# Patient Record
Sex: FEMALE | Race: BLACK OR AFRICAN AMERICAN | NOT HISPANIC OR LATINO
[De-identification: names, ages, dates, MRNs, and addresses within clinical notes are randomized per-mention and may not be internally consistent; named-entity substitution may affect disease eponyms.]

---

## 2017-09-20 ENCOUNTER — APPOINTMENT (OUTPATIENT)
Dept: ORTHOPEDIC SURGERY | Facility: CLINIC | Age: 67
End: 2017-09-20
Payer: MEDICARE

## 2017-09-20 PROCEDURE — 73562 X-RAY EXAM OF KNEE 3: CPT | Mod: 50

## 2017-09-20 PROCEDURE — 20610 DRAIN/INJ JOINT/BURSA W/O US: CPT | Mod: LT

## 2017-09-20 PROCEDURE — 99213 OFFICE O/P EST LOW 20 MIN: CPT | Mod: 25

## 2017-09-20 RX ORDER — OXYCODONE AND ACETAMINOPHEN 5; 325 MG/1; MG/1
5-325 TABLET ORAL
Qty: 120 | Refills: 0 | Status: COMPLETED | COMMUNITY
Start: 2017-06-13

## 2017-12-01 ENCOUNTER — EMERGENCY (EMERGENCY)
Facility: HOSPITAL | Age: 67
LOS: 0 days | Discharge: HOME | End: 2017-12-01
Attending: EMERGENCY MEDICINE
Payer: MEDICARE

## 2017-12-01 VITALS
SYSTOLIC BLOOD PRESSURE: 152 MMHG | HEART RATE: 104 BPM | RESPIRATION RATE: 18 BRPM | HEIGHT: 64 IN | TEMPERATURE: 98 F | OXYGEN SATURATION: 99 % | DIASTOLIC BLOOD PRESSURE: 88 MMHG | WEIGHT: 270.07 LBS
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
## 2017-12-01 DIAGNOSIS — M10.9 GOUT, UNSPECIFIED: ICD-10-CM

## 2017-12-01 DIAGNOSIS — T78.40XA ALLERGY, UNSPECIFIED, INITIAL ENCOUNTER: ICD-10-CM

## 2017-12-01 DIAGNOSIS — E66.01 MORBID (SEVERE) OBESITY DUE TO EXCESS CALORIES: ICD-10-CM

## 2017-12-01 DIAGNOSIS — G47.33 OBSTRUCTIVE SLEEP APNEA (ADULT) (PEDIATRIC): ICD-10-CM

## 2017-12-01 DIAGNOSIS — R21 RASH AND OTHER NONSPECIFIC SKIN ERUPTION: ICD-10-CM

## 2017-12-01 DIAGNOSIS — I10 ESSENTIAL (PRIMARY) HYPERTENSION: ICD-10-CM
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
## 2017-12-01 DIAGNOSIS — H40.9 UNSPECIFIED GLAUCOMA: ICD-10-CM

## 2017-12-01 DIAGNOSIS — M19.90 UNSPECIFIED OSTEOARTHRITIS, UNSPECIFIED SITE: ICD-10-CM

## 2017-12-01 LAB
ALBUMIN SERPL ELPH-MCNC: 3.6 G/DL — SIGNIFICANT CHANGE UP (ref 3.3–5)
ALP SERPL-CCNC: 61 U/L — SIGNIFICANT CHANGE UP (ref 40–120)
ALT FLD-CCNC: 24 U/L — SIGNIFICANT CHANGE UP (ref 12–78)
ANION GAP SERPL CALC-SCNC: 9 MMOL/L — SIGNIFICANT CHANGE UP (ref 5–17)
AST SERPL-CCNC: 15 U/L — SIGNIFICANT CHANGE UP (ref 15–37)
BASOPHILS # BLD AUTO: 0.1 K/UL — SIGNIFICANT CHANGE UP (ref 0–0.2)
BASOPHILS NFR BLD AUTO: 0.9 % — SIGNIFICANT CHANGE UP (ref 0–2)
BILIRUB SERPL-MCNC: 0.4 MG/DL — SIGNIFICANT CHANGE UP (ref 0.2–1.2)
BUN SERPL-MCNC: 19 MG/DL — SIGNIFICANT CHANGE UP (ref 7–23)
CALCIUM SERPL-MCNC: 9.3 MG/DL — SIGNIFICANT CHANGE UP (ref 8.5–10.1)
CHLORIDE SERPL-SCNC: 102 MMOL/L — SIGNIFICANT CHANGE UP (ref 96–108)
CO2 SERPL-SCNC: 29 MMOL/L — SIGNIFICANT CHANGE UP (ref 22–31)
CREAT SERPL-MCNC: 1.36 MG/DL — HIGH (ref 0.5–1.3)
EOSINOPHIL # BLD AUTO: 0 K/UL — SIGNIFICANT CHANGE UP (ref 0–0.5)
EOSINOPHIL NFR BLD AUTO: 0.5 % — SIGNIFICANT CHANGE UP (ref 0–6)
GLUCOSE SERPL-MCNC: 110 MG/DL — HIGH (ref 70–99)
HCT VFR BLD CALC: 42 % — SIGNIFICANT CHANGE UP (ref 34.5–45)
HGB BLD-MCNC: 13.2 G/DL — SIGNIFICANT CHANGE UP (ref 11.5–15.5)
LYMPHOCYTES # BLD AUTO: 3.1 K/UL — SIGNIFICANT CHANGE UP (ref 1–3.3)
LYMPHOCYTES # BLD AUTO: 41.2 % — SIGNIFICANT CHANGE UP (ref 13–44)
MCHC RBC-ENTMCNC: 29.1 PG — SIGNIFICANT CHANGE UP (ref 27–34)
MCHC RBC-ENTMCNC: 31.4 GM/DL — LOW (ref 32–36)
MCV RBC AUTO: 92.6 FL — SIGNIFICANT CHANGE UP (ref 80–100)
MONOCYTES # BLD AUTO: 0.4 K/UL — SIGNIFICANT CHANGE UP (ref 0–0.9)
MONOCYTES NFR BLD AUTO: 4.9 % — SIGNIFICANT CHANGE UP (ref 2–14)
NEUTROPHILS # BLD AUTO: 4 K/UL — SIGNIFICANT CHANGE UP (ref 1.8–7.4)
NEUTROPHILS NFR BLD AUTO: 52.5 % — SIGNIFICANT CHANGE UP (ref 43–77)
PLATELET # BLD AUTO: 282 K/UL — SIGNIFICANT CHANGE UP (ref 150–400)
POTASSIUM SERPL-MCNC: 4.1 MMOL/L — SIGNIFICANT CHANGE UP (ref 3.5–5.3)
POTASSIUM SERPL-SCNC: 4.1 MMOL/L — SIGNIFICANT CHANGE UP (ref 3.5–5.3)
PROT SERPL-MCNC: 8.9 GM/DL — HIGH (ref 6–8.3)
RBC # BLD: 4.54 M/UL — SIGNIFICANT CHANGE UP (ref 3.8–5.2)
RBC # FLD: 12.8 % — SIGNIFICANT CHANGE UP (ref 11–15)
SODIUM SERPL-SCNC: 140 MMOL/L — SIGNIFICANT CHANGE UP (ref 135–145)
WBC # BLD: 7.6 K/UL — SIGNIFICANT CHANGE UP (ref 3.8–10.5)
WBC # FLD AUTO: 7.6 K/UL — SIGNIFICANT CHANGE UP (ref 3.8–10.5)

## 2017-12-01 PROCEDURE — 99284 EMERGENCY DEPT VISIT MOD MDM: CPT

## 2017-12-01 RX ORDER — DIPHENHYDRAMINE HCL 50 MG
1 CAPSULE ORAL
Qty: 15 | Refills: 0
Start: 2017-12-01 | End: 2017-12-04

## 2017-12-01 RX ORDER — FAMOTIDINE 10 MG/ML
20 INJECTION INTRAVENOUS ONCE
Qty: 0 | Refills: 0 | Status: COMPLETED | OUTPATIENT
Start: 2017-12-01 | End: 2017-12-01

## 2017-12-01 RX ORDER — FAMOTIDINE 10 MG/ML
1 INJECTION INTRAVENOUS
Qty: 7 | Refills: 0
Start: 2017-12-01 | End: 2017-12-08

## 2017-12-01 RX ORDER — DIPHENHYDRAMINE HCL 50 MG
50 CAPSULE ORAL ONCE
Qty: 0 | Refills: 0 | Status: COMPLETED | OUTPATIENT
Start: 2017-12-01 | End: 2017-12-01

## 2017-12-01 RX ADMIN — Medication 50 MILLIGRAM(S): at 17:38

## 2017-12-01 RX ADMIN — Medication 125 MILLIGRAM(S): at 17:38

## 2017-12-01 RX ADMIN — FAMOTIDINE 20 MILLIGRAM(S): 10 INJECTION INTRAVENOUS at 17:38

## 2017-12-01 NOTE — ED ADULT TRIAGE NOTE - CHIEF COMPLAINT QUOTE
c/o allergic reaction with periorbital swelling, pruritic hives and throat swelling onset at 1545,15 mins after receiving dye at ent test denies difficulty breathing at present

## 2017-12-01 NOTE — ED ADULT NURSE NOTE - OBJECTIVE STATEMENT
Patient alert stating she had a FEEST test done and thinks she is having an allergic reaction to the dye they used. Observable swelling to right side of face in the orbital region and lip. Denies difficulty breathing.;

## 2017-12-01 NOTE — ED PROVIDER NOTE - CONSTITUTIONAL, MLM
normal... Well appearing, well nourished, awake, alert, oriented to person, place, time/situation and in no apparent distress. Speaking in clear full sentences no drooling no shoulders retractions smiling appears comfortable

## 2017-12-01 NOTE — ED PROVIDER NOTE - OBJECTIVE STATEMENT
67 years old female here c/o progressive itching hives to the face and upper back and chest after she went to Dr. Estrella MUKHERJEE with oral contrast at 3:00 pm today. Pt denies headache, difficulty of swallowing, dizziness, sob, chest pain, nausea, vomiting, fever, chills, abd pain, dysuria or irregular bowel movements. Pt sts she took benadryl 25 mg orally one hour ago.

## 2017-12-01 NOTE — ED PROVIDER NOTE - PROGRESS NOTE DETAILS
Pt is alert and oriented x 3 smiling tolerate oral water at bed side sts she is feeling much better rash is resolved. Pt also denies difficulty of swallowing, headache, dizziness, cough, chest pain, nausea, vomiting, fever, chills, abd pain. Pt is advised to follow up with her doctor as soon as possible.

## 2017-12-01 NOTE — ED PROVIDER NOTE - PMH
GCA (giant cell arteritis)    GERD (gastroesophageal reflux disease)    Glaucoma    Gout    H/O seasonal allergies    HTN (hypertension)    Morbid obesity    OA (osteoarthritis)    BERKLEY (obstructive sleep apnea)  possible  Shoulder pain  radaiting to neck

## 2017-12-01 NOTE — ED PROVIDER NOTE - THROAT FINDINGS
no exudate/no redness/uvula midline/no vesicles/NO VESICLES/ULCERS/NO DROOLING/NO TONGUE ELEVATION/NO STRIDOR

## 2017-12-05 ENCOUNTER — RESULT REVIEW (OUTPATIENT)
Age: 67
End: 2017-12-05

## 2018-05-23 ENCOUNTER — RESULT REVIEW (OUTPATIENT)
Age: 68
End: 2018-05-23

## 2018-11-02 ENCOUNTER — APPOINTMENT (OUTPATIENT)
Dept: ORTHOPEDIC SURGERY | Facility: CLINIC | Age: 68
End: 2018-11-02
Payer: MEDICARE

## 2018-11-02 VITALS — HEIGHT: 64 IN | BODY MASS INDEX: 48.65 KG/M2 | WEIGHT: 285 LBS

## 2018-11-02 PROCEDURE — 99213 OFFICE O/P EST LOW 20 MIN: CPT

## 2018-11-14 ENCOUNTER — APPOINTMENT (OUTPATIENT)
Dept: ORTHOPEDIC SURGERY | Facility: CLINIC | Age: 68
End: 2018-11-14
Payer: MEDICARE

## 2018-11-14 VITALS — WEIGHT: 285 LBS | BODY MASS INDEX: 48.65 KG/M2 | HEIGHT: 64 IN

## 2018-11-14 DIAGNOSIS — M17.0 BILATERAL PRIMARY OSTEOARTHRITIS OF KNEE: ICD-10-CM

## 2018-11-14 DIAGNOSIS — M21.161 VARUS DEFORMITY, NOT ELSEWHERE CLASSIFIED, RIGHT KNEE: ICD-10-CM

## 2018-11-14 DIAGNOSIS — M21.162 VARUS DEFORMITY, NOT ELSEWHERE CLASSIFIED, LEFT KNEE: ICD-10-CM

## 2018-11-14 DIAGNOSIS — M25.561 PAIN IN RIGHT KNEE: ICD-10-CM

## 2018-11-14 DIAGNOSIS — M25.562 PAIN IN RIGHT KNEE: ICD-10-CM

## 2018-11-14 PROCEDURE — 99215 OFFICE O/P EST HI 40 MIN: CPT | Mod: 25

## 2018-11-14 PROCEDURE — 20610 DRAIN/INJ JOINT/BURSA W/O US: CPT | Mod: 50

## 2018-11-14 PROCEDURE — 73564 X-RAY EXAM KNEE 4 OR MORE: CPT | Mod: 50

## 2019-11-27 ENCOUNTER — APPOINTMENT (OUTPATIENT)
Dept: ORTHOPEDIC SURGERY | Facility: CLINIC | Age: 69
End: 2019-11-27

## 2020-01-27 ENCOUNTER — TRANSCRIPTION ENCOUNTER (OUTPATIENT)
Age: 70
End: 2020-01-27

## 2020-08-25 ENCOUNTER — INPATIENT (INPATIENT)
Facility: HOSPITAL | Age: 70
LOS: 7 days | Discharge: HOME | DRG: 637 | End: 2020-09-02
Attending: SPECIALIST | Admitting: SPECIALIST
Payer: MEDICARE

## 2020-08-25 VITALS
WEIGHT: 279.99 LBS | HEART RATE: 102 BPM | SYSTOLIC BLOOD PRESSURE: 206 MMHG | HEIGHT: 64 IN | RESPIRATION RATE: 18 BRPM | TEMPERATURE: 98 F | DIASTOLIC BLOOD PRESSURE: 102 MMHG | OXYGEN SATURATION: 96 %

## 2020-08-25 LAB
BASOPHILS # BLD AUTO: 0.01 K/UL — SIGNIFICANT CHANGE UP (ref 0–0.2)
BASOPHILS NFR BLD AUTO: 0.1 % — SIGNIFICANT CHANGE UP (ref 0–2)
BILIRUB SERPL-MCNC: 0.5 MG/DL — SIGNIFICANT CHANGE UP (ref 0.2–1.2)
CHLORIDE SERPL-SCNC: 91 MMOL/L — LOW (ref 96–108)
CO2 SERPL-SCNC: 21 MMOL/L — LOW (ref 22–31)
CREAT SERPL-MCNC: 1.24 MG/DL — SIGNIFICANT CHANGE UP (ref 0.5–1.3)
EOSINOPHIL # BLD AUTO: 0 K/UL — SIGNIFICANT CHANGE UP (ref 0–0.5)
EOSINOPHIL NFR BLD AUTO: 0 % — SIGNIFICANT CHANGE UP (ref 0–6)
HCT VFR BLD CALC: 44.5 % — SIGNIFICANT CHANGE UP (ref 34.5–45)
HGB BLD-MCNC: 14.4 G/DL — SIGNIFICANT CHANGE UP (ref 11.5–15.5)
IMM GRANULOCYTES NFR BLD AUTO: 0.3 % — SIGNIFICANT CHANGE UP (ref 0–1.5)
LYMPHOCYTES # BLD AUTO: 0.81 K/UL — LOW (ref 1–3.3)
LYMPHOCYTES # BLD AUTO: 7.4 % — LOW (ref 13–44)
MAGNESIUM SERPL-MCNC: 2.4 MG/DL — SIGNIFICANT CHANGE UP (ref 1.6–2.6)
MCHC RBC-ENTMCNC: 29.6 PG — SIGNIFICANT CHANGE UP (ref 27–34)
MCHC RBC-ENTMCNC: 32.4 GM/DL — SIGNIFICANT CHANGE UP (ref 32–36)
MCV RBC AUTO: 91.4 FL — SIGNIFICANT CHANGE UP (ref 80–100)
MONOCYTES # BLD AUTO: 0.5 K/UL — SIGNIFICANT CHANGE UP (ref 0–0.9)
MONOCYTES NFR BLD AUTO: 4.6 % — SIGNIFICANT CHANGE UP (ref 2–14)
NEUTROPHILS # BLD AUTO: 9.53 K/UL — HIGH (ref 1.8–7.4)
NEUTROPHILS NFR BLD AUTO: 87.6 % — HIGH (ref 43–77)
NRBC # BLD: 0 /100 WBCS — SIGNIFICANT CHANGE UP (ref 0–0)
PHOSPHATE SERPL-MCNC: 3.3 MG/DL — SIGNIFICANT CHANGE UP (ref 2.5–4.5)
PLATELET # BLD AUTO: 177 K/UL — SIGNIFICANT CHANGE UP (ref 150–400)
POTASSIUM SERPL-MCNC: 5.2 MMOL/L — SIGNIFICANT CHANGE UP (ref 3.5–5.3)
POTASSIUM SERPL-SCNC: 5.2 MMOL/L — SIGNIFICANT CHANGE UP (ref 3.5–5.3)
RBC # BLD: 4.87 M/UL — SIGNIFICANT CHANGE UP (ref 3.8–5.2)
RBC # FLD: 13.9 % — SIGNIFICANT CHANGE UP (ref 10.3–14.5)
SODIUM SERPL-SCNC: 134 MMOL/L — LOW (ref 135–145)
WBC # BLD: 10.88 K/UL — HIGH (ref 3.8–10.5)
WBC # FLD AUTO: 10.88 K/UL — HIGH (ref 3.8–10.5)

## 2020-08-25 PROCEDURE — 93010 ELECTROCARDIOGRAM REPORT: CPT

## 2020-08-25 PROCEDURE — 99291 CRITICAL CARE FIRST HOUR: CPT

## 2020-08-25 RX ORDER — LABETALOL HCL 100 MG
10 TABLET ORAL ONCE
Refills: 0 | Status: COMPLETED | OUTPATIENT
Start: 2020-08-25 | End: 2020-08-25

## 2020-08-25 RX ADMIN — Medication 10 MILLIGRAM(S): at 23:59

## 2020-08-25 NOTE — ED PROVIDER NOTE - PROGRESS NOTE DETAILS
CMP sig for elevated glucose to 796 w/ anion gap of 22. Insulin gtt started 2/2 C/f DKA. 1L LR bolus. MICU consulted. Trop elevated to 74, will trend. Pending CT head, CTA head and neck for dystonia. Neuro consulted pending recs Evaluated pt prior to MICU transfer. Pt w/ similar presenting tremors but responding immediately when spoken to. Remained on the monitor during transfer. During transfer pt was also noted to have  similar tremors but  not following commands for the first time. Decision was made to continue MICU transfer. Upon arrival to the MICU, it was noted that patient was not on the monitor anymore.

## 2020-08-25 NOTE — ED PROVIDER NOTE - OBJECTIVE STATEMENT
71 y/o F hx of HTN, gout presenting w/ generalized weakness and gout. Pt states weakness began 2 weeks ago. Associated with fatigue. Patient also endorsing dysuria x 1 week w/ associated increase in urinary frequency. States 2 days ago she started to experience right sided hand tremors. No known aggravating or alleviating factors; present at rest and with movement. ROS otherwise positive for right sided chest pain x 2 days, described as a 3/10 in severity without radiation. Denies SOB, dizziness/light headedness, fevers, chills, nausea, vomiting, abdominal pain, constipation, diarrhea, recent travel or sick contacts.

## 2020-08-25 NOTE — ED PROVIDER NOTE - CLINICAL SUMMARY MEDICAL DECISION MAKING FREE TEXT BOX
69 y/o F hx of HTN, gout presenting w/ generalized weakness and gout presenting w/ generalized weakness w/ associated increased urinary frequency/dysuria, also w/ new onset focal upper extremity tremors. Concerning for UTI. R/o CVA in setting of FND. Will obtain labwork, CT head, CTA head and neck and consult neurology. Will reassess.

## 2020-08-25 NOTE — ED PROVIDER NOTE - PHYSICAL EXAMINATION
GENERAL APPEARANCE: Well developed, NAD  HEENT:  PERRL, EOMI. hearing grossly intact.  NECK: Neck supple, non-tender no lymphadenopathy  CARDIAC: Normal S1 and S2. no mrg. RRR  LUNGS: Clear to auscultation B/L, no rales, rhonchi, or wheezing  ABDOMEN: Soft , NTND, bowel sounds normal. No guarding or rebound.   EXTREMITIES: No edema. Peripheral pulses intact.   NEUROLOGICAL: + tremor right upper ext. Lower ext weakness B/L. Upper extremity 5/5 strength B/L. Sensation symmetric and intact throughout.   SKIN: Warm and dry GENERAL APPEARANCE: Well developed, NAD  HEENT:  PERRL, EOMI. hearing grossly intact.  NECK: Neck supple, non-tender no lymphadenopathy  CARDIAC: Normal S1 and S2. no mrg. RRR  LUNGS: Clear to auscultation B/L, no rales, rhonchi, or wheezing  ABDOMEN: Soft , NTND, bowel sounds normal. No guarding or rebound.   EXTREMITIES: + pitting edema. Peripheral pulses intact.   NEUROLOGICAL: + tremor right upper ext. Lower ext weakness B/L. Upper extremity 5/5 strength B/L. Sensation symmetric and intact throughout.   SKIN: Warm and dry

## 2020-08-25 NOTE — ED PROVIDER NOTE - ATTENDING CONTRIBUTION TO CARE
ED attending Xochitl HERZOG performed a history and physical exam of the patient and discussed their management with the resident/ACP. I reviewed the resident/ACP's note and agree with the documented findings and plan of care except for the following.       70 year old female with history of HTN, gout presented to ED with 2 weeks of right arm tremor as well as generalized weakness associated with increased urinary frequency. No headache, fever, chills. No similar prior episodes. No URI symptoms or sick contacts. No fall or traumas. On exam, pt noted to have right arm intentional tremor. Otherwise sensation intact. 5/5 strength in all 4 extremities. New intentional right upper extremity tremor likely secondary to structural CNS lesion vs CVA. Pt also reported atypical right sided chest pain. Will rule out ACS although low suspicion.  Plan: CT brain, labs, neurology consult. Reassess.

## 2020-08-25 NOTE — ED ADULT TRIAGE NOTE - CHIEF COMPLAINT QUOTE
Weakness and tremors in right arm x1 week. Daughter reports fevers and chills at home. Weakness, tremors and "stiffness" in right arm x1 week. Daughter reports fevers and chills at home.

## 2020-08-26 DIAGNOSIS — R73.9 HYPERGLYCEMIA, UNSPECIFIED: ICD-10-CM

## 2020-08-26 LAB
A1C WITH ESTIMATED AVERAGE GLUCOSE RESULT: 9 % — HIGH (ref 4–5.6)
ALBUMIN SERPL ELPH-MCNC: 3.7 G/DL — SIGNIFICANT CHANGE UP (ref 3.3–5)
ALBUMIN SERPL ELPH-MCNC: 3.7 G/DL — SIGNIFICANT CHANGE UP (ref 3.3–5)
ALP SERPL-CCNC: 118 U/L — SIGNIFICANT CHANGE UP (ref 40–120)
ALP SERPL-CCNC: 93 U/L — SIGNIFICANT CHANGE UP (ref 40–120)
ALT FLD-CCNC: 48 U/L — HIGH (ref 10–45)
ALT FLD-CCNC: 56 U/L — HIGH (ref 10–45)
ANION GAP SERPL CALC-SCNC: 12 MMOL/L — SIGNIFICANT CHANGE UP (ref 5–17)
ANION GAP SERPL CALC-SCNC: 13 MMOL/L — SIGNIFICANT CHANGE UP (ref 5–17)
ANION GAP SERPL CALC-SCNC: 15 MMOL/L — SIGNIFICANT CHANGE UP (ref 5–17)
ANION GAP SERPL CALC-SCNC: 20 MMOL/L — HIGH (ref 5–17)
ANION GAP SERPL CALC-SCNC: 22 MMOL/L — HIGH (ref 5–17)
ANION GAP SERPL CALC-SCNC: 25 MMOL/L — HIGH (ref 5–17)
ANION GAP SERPL CALC-SCNC: 25 MMOL/L — HIGH (ref 5–17)
APPEARANCE UR: CLEAR — SIGNIFICANT CHANGE UP
APTT BLD: 23.7 SEC — LOW (ref 27.5–35.5)
AST SERPL-CCNC: 28 U/L — SIGNIFICANT CHANGE UP (ref 10–40)
AST SERPL-CCNC: 60 U/L — HIGH (ref 10–40)
B-OH-BUTYR SERPL-SCNC: 2.3 MMOL/L — HIGH
BACTERIA # UR AUTO: ABNORMAL
BASE EXCESS BLDV CALC-SCNC: 5 MMOL/L — HIGH (ref -2–2)
BILIRUB SERPL-MCNC: 0.4 MG/DL — SIGNIFICANT CHANGE UP (ref 0.2–1.2)
BILIRUB UR-MCNC: NEGATIVE — SIGNIFICANT CHANGE UP
BUN SERPL-MCNC: 14 MG/DL — SIGNIFICANT CHANGE UP (ref 7–23)
BUN SERPL-MCNC: 18 MG/DL — SIGNIFICANT CHANGE UP (ref 7–23)
BUN SERPL-MCNC: 20 MG/DL — SIGNIFICANT CHANGE UP (ref 7–23)
BUN SERPL-MCNC: 24 MG/DL — HIGH (ref 7–23)
BUN SERPL-MCNC: 26 MG/DL — HIGH (ref 7–23)
BUN SERPL-MCNC: 26 MG/DL — HIGH (ref 7–23)
BUN SERPL-MCNC: 29 MG/DL — HIGH (ref 7–23)
CA-I SERPL-SCNC: 1.03 MMOL/L — LOW (ref 1.12–1.3)
CALCIUM SERPL-MCNC: 8.2 MG/DL — LOW (ref 8.4–10.5)
CALCIUM SERPL-MCNC: 8.4 MG/DL — SIGNIFICANT CHANGE UP (ref 8.4–10.5)
CALCIUM SERPL-MCNC: 8.7 MG/DL — SIGNIFICANT CHANGE UP (ref 8.4–10.5)
CALCIUM SERPL-MCNC: 8.8 MG/DL — SIGNIFICANT CHANGE UP (ref 8.4–10.5)
CALCIUM SERPL-MCNC: 9.1 MG/DL — SIGNIFICANT CHANGE UP (ref 8.4–10.5)
CALCIUM SERPL-MCNC: 9.6 MG/DL — SIGNIFICANT CHANGE UP (ref 8.4–10.5)
CALCIUM SERPL-MCNC: 9.7 MG/DL — SIGNIFICANT CHANGE UP (ref 8.4–10.5)
CHLORIDE BLDV-SCNC: 97 MMOL/L — SIGNIFICANT CHANGE UP (ref 96–108)
CHLORIDE SERPL-SCNC: 102 MMOL/L — SIGNIFICANT CHANGE UP (ref 96–108)
CHLORIDE SERPL-SCNC: 104 MMOL/L — SIGNIFICANT CHANGE UP (ref 96–108)
CHLORIDE SERPL-SCNC: 94 MMOL/L — LOW (ref 96–108)
CHLORIDE SERPL-SCNC: 98 MMOL/L — SIGNIFICANT CHANGE UP (ref 96–108)
CO2 BLDV-SCNC: 31 MMOL/L — HIGH (ref 22–30)
CO2 SERPL-SCNC: 20 MMOL/L — LOW (ref 22–31)
CO2 SERPL-SCNC: 22 MMOL/L — SIGNIFICANT CHANGE UP (ref 22–31)
CO2 SERPL-SCNC: 24 MMOL/L — SIGNIFICANT CHANGE UP (ref 22–31)
CO2 SERPL-SCNC: 25 MMOL/L — SIGNIFICANT CHANGE UP (ref 22–31)
CO2 SERPL-SCNC: 25 MMOL/L — SIGNIFICANT CHANGE UP (ref 22–31)
CO2 SERPL-SCNC: 27 MMOL/L — SIGNIFICANT CHANGE UP (ref 22–31)
COLOR SPEC: COLORLESS — SIGNIFICANT CHANGE UP
CREAT SERPL-MCNC: 0.72 MG/DL — SIGNIFICANT CHANGE UP (ref 0.5–1.3)
CREAT SERPL-MCNC: 0.92 MG/DL — SIGNIFICANT CHANGE UP (ref 0.5–1.3)
CREAT SERPL-MCNC: 0.95 MG/DL — SIGNIFICANT CHANGE UP (ref 0.5–1.3)
CREAT SERPL-MCNC: 1.07 MG/DL — SIGNIFICANT CHANGE UP (ref 0.5–1.3)
CREAT SERPL-MCNC: 1.12 MG/DL — SIGNIFICANT CHANGE UP (ref 0.5–1.3)
CREAT SERPL-MCNC: 1.27 MG/DL — SIGNIFICANT CHANGE UP (ref 0.5–1.3)
DIFF PNL FLD: ABNORMAL
EPI CELLS # UR: 1 /HPF — SIGNIFICANT CHANGE UP
ESTIMATED AVERAGE GLUCOSE: 212 MG/DL — HIGH (ref 68–114)
GAS PNL BLDA: SIGNIFICANT CHANGE UP
GAS PNL BLDA: SIGNIFICANT CHANGE UP
GAS PNL BLDV: 131 MMOL/L — LOW (ref 135–145)
GAS PNL BLDV: SIGNIFICANT CHANGE UP
GLUCOSE BLDC GLUCOMTR-MCNC: 123 MG/DL — HIGH (ref 70–99)
GLUCOSE BLDC GLUCOMTR-MCNC: 296 MG/DL — HIGH (ref 70–99)
GLUCOSE BLDC GLUCOMTR-MCNC: 303 MG/DL — HIGH (ref 70–99)
GLUCOSE BLDC GLUCOMTR-MCNC: 311 MG/DL — HIGH (ref 70–99)
GLUCOSE BLDC GLUCOMTR-MCNC: 317 MG/DL — HIGH (ref 70–99)
GLUCOSE BLDC GLUCOMTR-MCNC: 324 MG/DL — HIGH (ref 70–99)
GLUCOSE BLDC GLUCOMTR-MCNC: 330 MG/DL — HIGH (ref 70–99)
GLUCOSE BLDC GLUCOMTR-MCNC: 331 MG/DL — HIGH (ref 70–99)
GLUCOSE BLDC GLUCOMTR-MCNC: 336 MG/DL — HIGH (ref 70–99)
GLUCOSE BLDC GLUCOMTR-MCNC: 339 MG/DL — HIGH (ref 70–99)
GLUCOSE BLDC GLUCOMTR-MCNC: 343 MG/DL — HIGH (ref 70–99)
GLUCOSE BLDC GLUCOMTR-MCNC: 348 MG/DL — HIGH (ref 70–99)
GLUCOSE BLDC GLUCOMTR-MCNC: 354 MG/DL — HIGH (ref 70–99)
GLUCOSE BLDC GLUCOMTR-MCNC: 420 MG/DL — HIGH (ref 70–99)
GLUCOSE BLDC GLUCOMTR-MCNC: 473 MG/DL — CRITICAL HIGH (ref 70–99)
GLUCOSE BLDC GLUCOMTR-MCNC: 481 MG/DL — CRITICAL HIGH (ref 70–99)
GLUCOSE BLDC GLUCOMTR-MCNC: 515 MG/DL — CRITICAL HIGH (ref 70–99)
GLUCOSE BLDC GLUCOMTR-MCNC: 517 MG/DL — CRITICAL HIGH (ref 70–99)
GLUCOSE BLDC GLUCOMTR-MCNC: 550 MG/DL — CRITICAL HIGH (ref 70–99)
GLUCOSE BLDC GLUCOMTR-MCNC: 553 MG/DL — CRITICAL HIGH (ref 70–99)
GLUCOSE BLDV-MCNC: 653 MG/DL — CRITICAL HIGH (ref 70–99)
GLUCOSE SERPL-MCNC: 360 MG/DL — HIGH (ref 70–99)
GLUCOSE SERPL-MCNC: 364 MG/DL — HIGH (ref 70–99)
GLUCOSE SERPL-MCNC: 366 MG/DL — HIGH (ref 70–99)
GLUCOSE SERPL-MCNC: 499 MG/DL — CRITICAL HIGH (ref 70–99)
GLUCOSE SERPL-MCNC: 556 MG/DL — CRITICAL HIGH (ref 70–99)
GLUCOSE SERPL-MCNC: 591 MG/DL — CRITICAL HIGH (ref 70–99)
GLUCOSE SERPL-MCNC: 796 MG/DL — CRITICAL HIGH (ref 70–99)
GLUCOSE UR QL: ABNORMAL
GRAM STN FLD: SIGNIFICANT CHANGE UP
HCO3 BLDV-SCNC: 29 MMOL/L — SIGNIFICANT CHANGE UP (ref 21–29)
HCT VFR BLD CALC: 40.9 % — SIGNIFICANT CHANGE UP (ref 34.5–45)
HCT VFR BLDA CALC: 46 % — SIGNIFICANT CHANGE UP (ref 39–50)
HCV AB S/CO SERPL IA: 0.1 S/CO — SIGNIFICANT CHANGE UP (ref 0–0.99)
HCV AB SERPL-IMP: SIGNIFICANT CHANGE UP
HGB BLD CALC-MCNC: 14.8 G/DL — SIGNIFICANT CHANGE UP (ref 11.5–15.5)
HGB BLD-MCNC: 12.9 G/DL — SIGNIFICANT CHANGE UP (ref 11.5–15.5)
INR BLD: 1 RATIO — SIGNIFICANT CHANGE UP (ref 0.88–1.16)
KETONES UR-MCNC: ABNORMAL
LACTATE BLDV-MCNC: 3.1 MMOL/L — HIGH (ref 0.7–2)
LDH SERPL L TO P-CCNC: 338 U/L — HIGH (ref 50–242)
LEUKOCYTE ESTERASE UR-ACNC: ABNORMAL
MAGNESIUM SERPL-MCNC: 1.7 MG/DL — SIGNIFICANT CHANGE UP (ref 1.6–2.6)
MAGNESIUM SERPL-MCNC: 2.2 MG/DL — SIGNIFICANT CHANGE UP (ref 1.6–2.6)
MCHC RBC-ENTMCNC: 29.4 PG — SIGNIFICANT CHANGE UP (ref 27–34)
MCHC RBC-ENTMCNC: 31.5 GM/DL — LOW (ref 32–36)
MCV RBC AUTO: 93.2 FL — SIGNIFICANT CHANGE UP (ref 80–100)
NITRITE UR-MCNC: NEGATIVE — SIGNIFICANT CHANGE UP
NRBC # BLD: 0 /100 WBCS — SIGNIFICANT CHANGE UP (ref 0–0)
OSMOLALITY SERPL: 315 MOSMOL/KG — HIGH (ref 280–301)
PCO2 BLDV: 44 MMHG — SIGNIFICANT CHANGE UP (ref 35–50)
PH BLDV: 7.44 — SIGNIFICANT CHANGE UP (ref 7.35–7.45)
PH UR: 6.5 — SIGNIFICANT CHANGE UP (ref 5–8)
PHOSPHATE SERPL-MCNC: 1.3 MG/DL — LOW (ref 2.5–4.5)
PHOSPHATE SERPL-MCNC: 3.9 MG/DL — SIGNIFICANT CHANGE UP (ref 2.5–4.5)
PLATELET # BLD AUTO: 189 K/UL — SIGNIFICANT CHANGE UP (ref 150–400)
PO2 BLDV: 42 MMHG — SIGNIFICANT CHANGE UP (ref 25–45)
POTASSIUM BLDV-SCNC: 9.6 MMOL/L — CRITICAL HIGH (ref 3.5–5.3)
POTASSIUM SERPL-MCNC: 3.1 MMOL/L — LOW (ref 3.5–5.3)
POTASSIUM SERPL-MCNC: 3.1 MMOL/L — LOW (ref 3.5–5.3)
POTASSIUM SERPL-MCNC: 3.2 MMOL/L — LOW (ref 3.5–5.3)
POTASSIUM SERPL-MCNC: 3.3 MMOL/L — LOW (ref 3.5–5.3)
POTASSIUM SERPL-MCNC: 3.9 MMOL/L — SIGNIFICANT CHANGE UP (ref 3.5–5.3)
POTASSIUM SERPL-MCNC: 7.1 MMOL/L — CRITICAL HIGH (ref 3.5–5.3)
POTASSIUM SERPL-SCNC: 3.1 MMOL/L — LOW (ref 3.5–5.3)
POTASSIUM SERPL-SCNC: 3.1 MMOL/L — LOW (ref 3.5–5.3)
POTASSIUM SERPL-SCNC: 3.2 MMOL/L — LOW (ref 3.5–5.3)
POTASSIUM SERPL-SCNC: 3.3 MMOL/L — LOW (ref 3.5–5.3)
POTASSIUM SERPL-SCNC: 3.9 MMOL/L — SIGNIFICANT CHANGE UP (ref 3.5–5.3)
POTASSIUM SERPL-SCNC: 7.1 MMOL/L — CRITICAL HIGH (ref 3.5–5.3)
PROT SERPL-MCNC: 6.7 G/DL — SIGNIFICANT CHANGE UP (ref 6–8.3)
PROT SERPL-MCNC: 7.7 G/DL — SIGNIFICANT CHANGE UP (ref 6–8.3)
PROT UR-MCNC: ABNORMAL
PROTHROM AB SERPL-ACNC: 11.9 SEC — SIGNIFICANT CHANGE UP (ref 10.6–13.6)
RBC # BLD: 4.39 M/UL — SIGNIFICANT CHANGE UP (ref 3.8–5.2)
RBC # FLD: 13.6 % — SIGNIFICANT CHANGE UP (ref 10.3–14.5)
RBC CASTS # UR COMP ASSIST: 2 /HPF — SIGNIFICANT CHANGE UP (ref 0–4)
SAO2 % BLDV: 76 % — SIGNIFICANT CHANGE UP (ref 67–88)
SARS-COV-2 IGG SERPL QL IA: NEGATIVE — SIGNIFICANT CHANGE UP
SARS-COV-2 IGM SERPL IA-ACNC: <0.1 INDEX — SIGNIFICANT CHANGE UP
SARS-COV-2 RNA SPEC QL NAA+PROBE: SIGNIFICANT CHANGE UP
SODIUM SERPL-SCNC: 139 MMOL/L — SIGNIFICANT CHANGE UP (ref 135–145)
SODIUM SERPL-SCNC: 141 MMOL/L — SIGNIFICANT CHANGE UP (ref 135–145)
SODIUM SERPL-SCNC: 144 MMOL/L — SIGNIFICANT CHANGE UP (ref 135–145)
SODIUM SERPL-SCNC: 144 MMOL/L — SIGNIFICANT CHANGE UP (ref 135–145)
SODIUM SERPL-SCNC: 145 MMOL/L — SIGNIFICANT CHANGE UP (ref 135–145)
SODIUM SERPL-SCNC: 146 MMOL/L — HIGH (ref 135–145)
SP GR SPEC: 1.03 — HIGH (ref 1.01–1.02)
SPECIMEN SOURCE: SIGNIFICANT CHANGE UP
TROPONIN T, HIGH SENSITIVITY RESULT: 72 NG/L — HIGH (ref 0–51)
UROBILINOGEN FLD QL: NEGATIVE — SIGNIFICANT CHANGE UP
WBC # BLD: 8.96 K/UL — SIGNIFICANT CHANGE UP (ref 3.8–10.5)
WBC # FLD AUTO: 8.96 K/UL — SIGNIFICANT CHANGE UP (ref 3.8–10.5)
WBC UR QL: 10 /HPF — HIGH (ref 0–5)

## 2020-08-26 PROCEDURE — 93010 ELECTROCARDIOGRAM REPORT: CPT

## 2020-08-26 PROCEDURE — 93306 TTE W/DOPPLER COMPLETE: CPT | Mod: 26

## 2020-08-26 PROCEDURE — 71045 X-RAY EXAM CHEST 1 VIEW: CPT | Mod: 26,77

## 2020-08-26 PROCEDURE — 70496 CT ANGIOGRAPHY HEAD: CPT | Mod: 26

## 2020-08-26 PROCEDURE — 99223 1ST HOSP IP/OBS HIGH 75: CPT

## 2020-08-26 PROCEDURE — 70498 CT ANGIOGRAPHY NECK: CPT | Mod: 26

## 2020-08-26 PROCEDURE — 71045 X-RAY EXAM CHEST 1 VIEW: CPT | Mod: 26

## 2020-08-26 RX ORDER — CHLORHEXIDINE GLUCONATE 213 G/1000ML
1 SOLUTION TOPICAL
Refills: 0 | Status: DISCONTINUED | OUTPATIENT
Start: 2020-08-26 | End: 2020-09-02

## 2020-08-26 RX ORDER — CEFTRIAXONE 500 MG/1
1000 INJECTION, POWDER, FOR SOLUTION INTRAMUSCULAR; INTRAVENOUS EVERY 24 HOURS
Refills: 0 | Status: DISCONTINUED | OUTPATIENT
Start: 2020-08-26 | End: 2020-08-26

## 2020-08-26 RX ORDER — POTASSIUM PHOSPHATE, MONOBASIC POTASSIUM PHOSPHATE, DIBASIC 236; 224 MG/ML; MG/ML
30 INJECTION, SOLUTION INTRAVENOUS ONCE
Refills: 0 | Status: COMPLETED | OUTPATIENT
Start: 2020-08-26 | End: 2020-08-26

## 2020-08-26 RX ORDER — CYCLOBENZAPRINE HYDROCHLORIDE 10 MG/1
1 TABLET, FILM COATED ORAL
Qty: 0 | Refills: 0 | DISCHARGE

## 2020-08-26 RX ORDER — ALLOPURINOL 300 MG
1 TABLET ORAL
Qty: 0 | Refills: 0 | DISCHARGE

## 2020-08-26 RX ORDER — SODIUM CHLORIDE 9 MG/ML
10 INJECTION INTRAMUSCULAR; INTRAVENOUS; SUBCUTANEOUS
Refills: 0 | Status: DISCONTINUED | OUTPATIENT
Start: 2020-08-26 | End: 2020-08-27

## 2020-08-26 RX ORDER — INSULIN HUMAN 100 [IU]/ML
2 INJECTION, SOLUTION SUBCUTANEOUS
Qty: 100 | Refills: 0 | Status: DISCONTINUED | OUTPATIENT
Start: 2020-08-26 | End: 2020-08-27

## 2020-08-26 RX ORDER — DEXTROSE 50 % IN WATER 50 %
25 SYRINGE (ML) INTRAVENOUS ONCE
Refills: 0 | Status: DISCONTINUED | OUTPATIENT
Start: 2020-08-26 | End: 2020-09-02

## 2020-08-26 RX ORDER — POTASSIUM CHLORIDE 20 MEQ
40 PACKET (EA) ORAL ONCE
Refills: 0 | Status: COMPLETED | OUTPATIENT
Start: 2020-08-26 | End: 2020-08-26

## 2020-08-26 RX ORDER — CEFTRIAXONE 500 MG/1
2000 INJECTION, POWDER, FOR SOLUTION INTRAMUSCULAR; INTRAVENOUS EVERY 12 HOURS
Refills: 0 | Status: DISCONTINUED | OUTPATIENT
Start: 2020-08-26 | End: 2020-08-26

## 2020-08-26 RX ORDER — SODIUM CHLORIDE 9 MG/ML
1000 INJECTION, SOLUTION INTRAVENOUS
Refills: 0 | Status: DISCONTINUED | OUTPATIENT
Start: 2020-08-26 | End: 2020-08-26

## 2020-08-26 RX ORDER — POTASSIUM CHLORIDE 20 MEQ
20 PACKET (EA) ORAL
Refills: 0 | Status: COMPLETED | OUTPATIENT
Start: 2020-08-26 | End: 2020-08-26

## 2020-08-26 RX ORDER — ENOXAPARIN SODIUM 100 MG/ML
30 INJECTION SUBCUTANEOUS
Refills: 0 | Status: DISCONTINUED | OUTPATIENT
Start: 2020-08-26 | End: 2020-08-26

## 2020-08-26 RX ORDER — LABETALOL HCL 100 MG
20 TABLET ORAL ONCE
Refills: 0 | Status: COMPLETED | OUTPATIENT
Start: 2020-08-26 | End: 2020-08-26

## 2020-08-26 RX ORDER — LEVETIRACETAM 250 MG/1
1500 TABLET, FILM COATED ORAL ONCE
Refills: 0 | Status: COMPLETED | OUTPATIENT
Start: 2020-08-26 | End: 2020-08-26

## 2020-08-26 RX ORDER — SODIUM CHLORIDE 9 MG/ML
1000 INJECTION, SOLUTION INTRAVENOUS
Refills: 0 | Status: DISCONTINUED | OUTPATIENT
Start: 2020-08-26 | End: 2020-09-02

## 2020-08-26 RX ORDER — POTASSIUM CHLORIDE 20 MEQ
10 PACKET (EA) ORAL
Refills: 0 | Status: DISCONTINUED | OUTPATIENT
Start: 2020-08-26 | End: 2020-08-26

## 2020-08-26 RX ORDER — GLUCAGON INJECTION, SOLUTION 0.5 MG/.1ML
1 INJECTION, SOLUTION SUBCUTANEOUS ONCE
Refills: 0 | Status: DISCONTINUED | OUTPATIENT
Start: 2020-08-26 | End: 2020-09-02

## 2020-08-26 RX ORDER — DEXTROSE MONOHYDRATE, SODIUM CHLORIDE, AND POTASSIUM CHLORIDE 50; .745; 4.5 G/1000ML; G/1000ML; G/1000ML
1000 INJECTION, SOLUTION INTRAVENOUS
Refills: 0 | Status: DISCONTINUED | OUTPATIENT
Start: 2020-08-26 | End: 2020-08-27

## 2020-08-26 RX ORDER — LABETALOL HCL 100 MG
10 TABLET ORAL ONCE
Refills: 0 | Status: COMPLETED | OUTPATIENT
Start: 2020-08-26 | End: 2020-08-26

## 2020-08-26 RX ORDER — CEFTRIAXONE 500 MG/1
1000 INJECTION, POWDER, FOR SOLUTION INTRAMUSCULAR; INTRAVENOUS EVERY 24 HOURS
Refills: 0 | Status: DISCONTINUED | OUTPATIENT
Start: 2020-08-27 | End: 2020-08-28

## 2020-08-26 RX ORDER — CHLORHEXIDINE GLUCONATE 213 G/1000ML
15 SOLUTION TOPICAL EVERY 12 HOURS
Refills: 0 | Status: DISCONTINUED | OUTPATIENT
Start: 2020-08-26 | End: 2020-08-26

## 2020-08-26 RX ORDER — HYDROXYZINE HCL 10 MG
2 TABLET ORAL
Qty: 0 | Refills: 0 | DISCHARGE

## 2020-08-26 RX ORDER — SODIUM CHLORIDE 9 MG/ML
1000 INJECTION INTRAMUSCULAR; INTRAVENOUS; SUBCUTANEOUS ONCE
Refills: 0 | Status: COMPLETED | OUTPATIENT
Start: 2020-08-26 | End: 2020-08-26

## 2020-08-26 RX ORDER — NICARDIPINE HYDROCHLORIDE 30 MG/1
5 CAPSULE, EXTENDED RELEASE ORAL
Qty: 40 | Refills: 0 | Status: DISCONTINUED | OUTPATIENT
Start: 2020-08-26 | End: 2020-08-27

## 2020-08-26 RX ORDER — MIDAZOLAM HYDROCHLORIDE 1 MG/ML
0.02 INJECTION, SOLUTION INTRAMUSCULAR; INTRAVENOUS
Qty: 100 | Refills: 0 | Status: DISCONTINUED | OUTPATIENT
Start: 2020-08-26 | End: 2020-08-26

## 2020-08-26 RX ORDER — CHLORHEXIDINE GLUCONATE 213 G/1000ML
15 SOLUTION TOPICAL EVERY 12 HOURS
Refills: 0 | Status: DISCONTINUED | OUTPATIENT
Start: 2020-08-26 | End: 2020-08-27

## 2020-08-26 RX ORDER — DEXTROSE 50 % IN WATER 50 %
12.5 SYRINGE (ML) INTRAVENOUS ONCE
Refills: 0 | Status: DISCONTINUED | OUTPATIENT
Start: 2020-08-26 | End: 2020-09-02

## 2020-08-26 RX ORDER — POTASSIUM CHLORIDE 20 MEQ
20 PACKET (EA) ORAL ONCE
Refills: 0 | Status: COMPLETED | OUTPATIENT
Start: 2020-08-26 | End: 2020-08-26

## 2020-08-26 RX ORDER — BACLOFEN 100 %
5 POWDER (GRAM) MISCELLANEOUS ONCE
Refills: 0 | Status: COMPLETED | OUTPATIENT
Start: 2020-08-26 | End: 2020-08-26

## 2020-08-26 RX ORDER — ATORVASTATIN CALCIUM 80 MG/1
80 TABLET, FILM COATED ORAL AT BEDTIME
Refills: 0 | Status: DISCONTINUED | OUTPATIENT
Start: 2020-08-26 | End: 2020-08-29

## 2020-08-26 RX ORDER — OMEPRAZOLE 10 MG/1
1 CAPSULE, DELAYED RELEASE ORAL
Qty: 0 | Refills: 0 | DISCHARGE

## 2020-08-26 RX ORDER — PROPOFOL 10 MG/ML
50 INJECTION, EMULSION INTRAVENOUS
Qty: 1000 | Refills: 0 | Status: DISCONTINUED | OUTPATIENT
Start: 2020-08-26 | End: 2020-08-27

## 2020-08-26 RX ORDER — SODIUM CHLORIDE 9 MG/ML
1000 INJECTION, SOLUTION INTRAVENOUS ONCE
Refills: 0 | Status: COMPLETED | OUTPATIENT
Start: 2020-08-26 | End: 2020-08-26

## 2020-08-26 RX ORDER — LEVETIRACETAM 250 MG/1
2500 TABLET, FILM COATED ORAL ONCE
Refills: 0 | Status: DISCONTINUED | OUTPATIENT
Start: 2020-08-26 | End: 2020-08-26

## 2020-08-26 RX ORDER — ENOXAPARIN SODIUM 100 MG/ML
40 INJECTION SUBCUTANEOUS EVERY 12 HOURS
Refills: 0 | Status: DISCONTINUED | OUTPATIENT
Start: 2020-08-26 | End: 2020-09-02

## 2020-08-26 RX ORDER — DEXTROSE 50 % IN WATER 50 %
15 SYRINGE (ML) INTRAVENOUS ONCE
Refills: 0 | Status: DISCONTINUED | OUTPATIENT
Start: 2020-08-26 | End: 2020-09-02

## 2020-08-26 RX ORDER — PANTOPRAZOLE SODIUM 20 MG/1
40 TABLET, DELAYED RELEASE ORAL DAILY
Refills: 0 | Status: DISCONTINUED | OUTPATIENT
Start: 2020-08-26 | End: 2020-08-26

## 2020-08-26 RX ORDER — PANTOPRAZOLE SODIUM 20 MG/1
40 TABLET, DELAYED RELEASE ORAL DAILY
Refills: 0 | Status: DISCONTINUED | OUTPATIENT
Start: 2020-08-26 | End: 2020-08-30

## 2020-08-26 RX ORDER — LEVETIRACETAM 250 MG/1
1000 TABLET, FILM COATED ORAL EVERY 12 HOURS
Refills: 0 | Status: DISCONTINUED | OUTPATIENT
Start: 2020-08-26 | End: 2020-09-02

## 2020-08-26 RX ADMIN — INSULIN HUMAN 2.5 UNIT(S)/HR: 100 INJECTION, SOLUTION SUBCUTANEOUS at 22:15

## 2020-08-26 RX ADMIN — DEXTROSE MONOHYDRATE, SODIUM CHLORIDE, AND POTASSIUM CHLORIDE 200 MILLILITER(S): 50; .745; 4.5 INJECTION, SOLUTION INTRAVENOUS at 19:50

## 2020-08-26 RX ADMIN — ENOXAPARIN SODIUM 30 MILLIGRAM(S): 100 INJECTION SUBCUTANEOUS at 05:26

## 2020-08-26 RX ADMIN — Medication 50 MILLIEQUIVALENT(S): at 19:50

## 2020-08-26 RX ADMIN — POTASSIUM PHOSPHATE, MONOBASIC POTASSIUM PHOSPHATE, DIBASIC 83.33 MILLIMOLE(S): 236; 224 INJECTION, SOLUTION INTRAVENOUS at 22:26

## 2020-08-26 RX ADMIN — PANTOPRAZOLE SODIUM 40 MILLIGRAM(S): 20 TABLET, DELAYED RELEASE ORAL at 11:40

## 2020-08-26 RX ADMIN — Medication 100 MILLIEQUIVALENT(S): at 05:53

## 2020-08-26 RX ADMIN — ATORVASTATIN CALCIUM 80 MILLIGRAM(S): 80 TABLET, FILM COATED ORAL at 21:08

## 2020-08-26 RX ADMIN — Medication 50 MILLIEQUIVALENT(S): at 09:32

## 2020-08-26 RX ADMIN — LEVETIRACETAM 400 MILLIGRAM(S): 250 TABLET, FILM COATED ORAL at 17:52

## 2020-08-26 RX ADMIN — MIDAZOLAM HYDROCHLORIDE 2.54 MG/KG/HR: 1 INJECTION, SOLUTION INTRAMUSCULAR; INTRAVENOUS at 05:15

## 2020-08-26 RX ADMIN — CEFTRIAXONE 100 MILLIGRAM(S): 500 INJECTION, POWDER, FOR SOLUTION INTRAMUSCULAR; INTRAVENOUS at 05:54

## 2020-08-26 RX ADMIN — Medication 10 MILLIGRAM(S): at 01:40

## 2020-08-26 RX ADMIN — Medication 100 MILLIEQUIVALENT(S): at 06:51

## 2020-08-26 RX ADMIN — INSULIN HUMAN 5.7 UNIT(S)/HR: 100 INJECTION, SOLUTION SUBCUTANEOUS at 01:39

## 2020-08-26 RX ADMIN — LEVETIRACETAM 400 MILLIGRAM(S): 250 TABLET, FILM COATED ORAL at 05:14

## 2020-08-26 RX ADMIN — SODIUM CHLORIDE 1000 MILLILITER(S): 9 INJECTION, SOLUTION INTRAVENOUS at 01:40

## 2020-08-26 RX ADMIN — CHLORHEXIDINE GLUCONATE 15 MILLILITER(S): 213 SOLUTION TOPICAL at 17:52

## 2020-08-26 RX ADMIN — SODIUM CHLORIDE 250 MILLILITER(S): 9 INJECTION, SOLUTION INTRAVENOUS at 05:26

## 2020-08-26 RX ADMIN — Medication 5 MILLIGRAM(S): at 02:47

## 2020-08-26 RX ADMIN — SODIUM CHLORIDE 1000 MILLILITER(S): 9 INJECTION INTRAMUSCULAR; INTRAVENOUS; SUBCUTANEOUS at 05:14

## 2020-08-26 RX ADMIN — ENOXAPARIN SODIUM 40 MILLIGRAM(S): 100 INJECTION SUBCUTANEOUS at 17:51

## 2020-08-26 RX ADMIN — Medication 20 MILLIGRAM(S): at 02:47

## 2020-08-26 RX ADMIN — NICARDIPINE HYDROCHLORIDE 25 MG/HR: 30 CAPSULE, EXTENDED RELEASE ORAL at 09:15

## 2020-08-26 RX ADMIN — Medication 40 MILLIEQUIVALENT(S): at 17:52

## 2020-08-26 RX ADMIN — INSULIN HUMAN 3 UNIT(S)/HR: 100 INJECTION, SOLUTION SUBCUTANEOUS at 02:15

## 2020-08-26 RX ADMIN — Medication 50 MILLIEQUIVALENT(S): at 15:06

## 2020-08-26 RX ADMIN — CHLORHEXIDINE GLUCONATE 1 APPLICATION(S): 213 SOLUTION TOPICAL at 06:39

## 2020-08-26 RX ADMIN — Medication 50 MILLIEQUIVALENT(S): at 11:24

## 2020-08-26 RX ADMIN — Medication 50 MILLIEQUIVALENT(S): at 17:52

## 2020-08-26 RX ADMIN — CHLORHEXIDINE GLUCONATE 15 MILLILITER(S): 213 SOLUTION TOPICAL at 05:17

## 2020-08-26 RX ADMIN — Medication 40 MILLIEQUIVALENT(S): at 09:32

## 2020-08-26 RX ADMIN — INSULIN HUMAN 2 UNIT(S)/HR: 100 INJECTION, SOLUTION SUBCUTANEOUS at 06:51

## 2020-08-26 RX ADMIN — PROPOFOL 38.1 MICROGRAM(S)/KG/MIN: 10 INJECTION, EMULSION INTRAVENOUS at 05:26

## 2020-08-26 NOTE — PROVIDER CONTACT NOTE (OTHER) - SITUATION
Pt arrived to MICU actively seizing & unresponsive while rolling onto the unit. MICU staff immediately gave ativan & transfer pt from stretcher to bed, place on monitor & pt was intubated.

## 2020-08-26 NOTE — H&P ADULT - ASSESSMENT
Pt is a 69 y/o F w/ PMH of HTN, gout, presenting for generalized weakness, dysuria, and increased urinary frequency as well as new-onset hand tremors, found to have glucose level of 796 w/ anion gap of 22 ______________. Pt is a 69 y/o F w/ PMH of HTN, gout, presenting for generalized weakness, dysuria, and increased urinary frequency as well as new-onset hand tremors, found to have glucose level of 796 w/ anion gap of 22 ______________.    NEURO:    CARDIOVASCULAR:    PULMONARY:    GI:    RENAL:    ENDOCRINE:    INFECTIOUS DISEASE:    HEME      ACCEPTED TO MICU Pt is a 71 y/o F w/ PMH of HTN, gout, presenting for generalized weakness, dysuria, and increased urinary frequency as well as new-onset hand tremors, found to have glucose level of 796 w/ anion gap of 22 ______________.    #NEURO  Seizures        CARDIOVASCULAR:    PULMONARY:    GI:    RENAL:    ENDOCRINE:    INFECTIOUS DISEASE:    HEME      ACCEPTED TO MICU Pt is a 71 y/o F w/ PMH of HTN, gout, presenting for generalized weakness, dysuria, and increased urinary frequency as well as new-onset hand tremors, found to have glucose level of 796 w/ anion gap of 22 ______________.    #NEURO  Seizures    CARDIOVASCULAR:    PULMONARY:    GI:    RENAL:    ENDOCRINE:    INFECTIOUS DISEASE:    HEME      ACCEPTED TO MICU Pt is a 69 y/o F w/ PMH of HTN, gout, presenting for generalized weakness, dysuria, and increased urinary frequency as well as new-onset hand tremors, found to have glucose level of 796 w/ anion gap of 22 ______________.    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  - Likely provoked seizure 2/2 hyperglycemia/fluid shift from glucose correction   - s/p 1.5g keppra load  - c/w Keppra 1 g IV BID   - 24hr vEEG 24 hr  - MRI brain once stable as per neuro    ENDOCRINE:  #HHS  - c/w Insulin gtt  - Q4H BMP w/ Mg & Phos  - c/w LR @ 250 cc/hr  - aggressive potassium repletion     CARDIOVASCULAR:  - Initially presented with HTN urgency  - Holding antihypertensives as pt briefly required levophed following intubation    PULMONARY:  - intubated /14/60/5    GI  - TFs    RENAL  - Cr 1.27: last prior Cr 1.36 in 2017  - Monitor renal function        INFECTIOUS DISEASE:    HEME      ACCEPTED TO MICU Pt is a 69 y/o F w/ PMH of HTN, gout, p/w R arm tremors, found to have HHS. 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  - Likely provoked seizure 2/2 hyperglycemia/fluid shift from glucose correction   - s/p 1.5g keppra load  - c/w Keppra 1 g IV BID   - 24hr vEEG 24 hr  - MRI brain once stable as per neuro  - Empiric CTX 2g Q12H    ENDOCRINE:  #HHS  - c/w Insulin gtt  - Q4H BMP w/ Mg & Phos  - c/w LR @ 250 cc/hr  - aggressive potassium repletion   - Endocrine c/s in AM  - A1c pending    CARDIOVASCULAR:  - Initially presented with HTN urgency  - Holding antihypertensives as pt briefly required levophed following intubation    PULMONARY:  - intubated for airway protection  - /14/60/5    GI  - TFs    RENAL  - Cr 1.27: last prior Cr 1.36 in 2017  - Monitor renal function    INFECTIOUS DISEASE:  - afebrile since arrival  - f/u blood and urine cultures  - c/w empiric meningitis dose CTX    HEME  - SQ heparin for DVT prophylaxis Pt is a 69 y/o F w/ PMH of HTN, gout, p/w R arm tremors, found to have HHS. 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  - Likely provoked seizure 2/2 hyperglycemia/fluid shift from glucose correction   - s/p 1.5g keppra load  - c/w Keppra 1 g IV BID   - 24hr vEEG 24 hr  - MRI brain once stable as per neuro  - Empiric CTX 2g Q12H    ENDOCRINE:  #HHS  - c/w Insulin gtt  - Q4H BMP w/ Mg & Phos  - c/w LR @ 250 cc/hr  - aggressive potassium repletion   - Endocrine c/s in AM  - A1c pending    CARDIOVASCULAR:  - Initially presented with HTN urgency  - Holding antihypertensives as pt briefly required levophed following intubation    PULMONARY:  - intubated for airway protection  - /14/60/5    GI  - holding TFs  - PPI    RENAL  - Cr 1.27: last prior Cr 1.36 in 2017  - Monitor renal function    INFECTIOUS DISEASE:  - afebrile since arrival  - f/u blood and urine cultures  - c/w empiric meningitis dose CTX    HEME  - SQ heparin for DVT prophylaxis

## 2020-08-26 NOTE — PROVIDER CONTACT NOTE (OTHER) - BACKGROUND
Unsure of start of seizure activity/ unresponsiveness as per unclear report from transporting MD, RN & tech.

## 2020-08-26 NOTE — PROCEDURE NOTE - NSINDICATIONS_GEN_A_CORE
monitoring purposes/critical patient
airway protection/critical patient
emergency venous access/critical illness

## 2020-08-26 NOTE — H&P ADULT - NSHPPHYSICALEXAM_GEN_ALL_CORE
T(C): 37.7 (08-26-20 @ 00:53), Max: 37.7 (08-26-20 @ 00:53)  HR: 67 (08-26-20 @ 04:00) (67 - 110)  BP: 117/68 (08-26-20 @ 04:00) (102/71 - 236/116)  RR: 14 (08-26-20 @ 04:00) (14 - 18)  SpO2: 99% (08-26-20 @ 04:00) (81% - 100%)  General: intubated and sedated obese female   Eyes: no conjunctival erythema,   ENT: MMM  Neck: Neck supple, No JVD  Respiratory: decreased breath sounds B/L, No wheezing, rales, rhonchi  CV: RRR no murmurs  Abdominal: Soft, NT, ND +BS  MSK: no joint swelling  Extremities: No edema, 2+ peripheral pulses  Neurology: A&Ox0  Skin: No Rashes, Hematoma, Ecchymosis  Psych: sedated T(C): 37.7 (08-26-20 @ 00:53), Max: 37.7 (08-26-20 @ 00:53)  HR: 67 (08-26-20 @ 04:00) (67 - 110)  BP: 117/68 (08-26-20 @ 04:00) (102/71 - 236/116)  RR: 14 (08-26-20 @ 04:00) (14 - 18)  SpO2: 99% (08-26-20 @ 04:00) (81% - 100%)  General: intubated and sedated obese female   Eyes: no conjunctival erythema, b/l pinpoint pupils   ENT: MMM  Neck: Neck supple, No JVD  Respiratory: decreased breath sounds B/L, No wheezing, rales, rhonchi  CV: RRR no murmurs  Abdominal: Soft, NT, ND +BS  MSK: no joint swelling  Extremities: No edema, 2+ peripheral pulses  Neurology: A&Ox0, sedated   Skin: No Rashes, Hematoma, Ecchymosis  Psych: unable to assess

## 2020-08-26 NOTE — H&P ADULT - NSHPLABSRESULTS_GEN_ALL_CORE
LABS:                          14.4   10.88 )-----------( 177      ( 25 Aug 2020 22:43 )             44.5         134<L>  |  91<L>  |  29<H>  ----------------------------<  796<HH>  5.2   |  21<L>  |  1.24    Ca    9.6      25 Aug 2020 22:43  Phos  3.3       Mg     2.4         TPro  7.7  /  Alb  3.7  /  TBili  0.5  /  DBili  x   /  AST  60<H>  /  ALT  56<H>  /  AlkPhos  118      LIVER FUNCTIONS - ( 25 Aug 2020 22:43 )  Alb: 3.7 g/dL / Pro: 7.7 g/dL / ALK PHOS: 118 U/L / ALT: 56 U/L / AST: 60 U/L / GGT: x             Urinalysis Basic - ( 26 Aug 2020 00:16 )    Color: Colorless / Appearance: Clear / S.034 / pH: x  Gluc: x / Ketone: Small  / Bili: Negative / Urobili: Negative   Blood: x / Protein: 30 mg/dL / Nitrite: Negative   Leuk Esterase: Small / RBC: 2 /hpf / WBC 10 /HPF   Sq Epi: x / Non Sq Epi: 1 /hpf / Bacteria: Few    CXR (2020)  Preliminary Report  "Impression: Clear Lungs"

## 2020-08-26 NOTE — PROCEDURE NOTE - NSPROCDETAILS_GEN_ALL_CORE
patient pre-oxygenated, tube inserted, placement confirmed
all materials/supplies accounted for at end of procedure/ultrasound guidance/sutured in place/hemostasis with direct pressure, dressing applied/Seldinger technique/location identified, draped/prepped, sterile technique used, needle inserted/introduced/connected to a pressurized flush line/positive blood return obtained via catheter
lumen(s) aspirated and flushed/sterile dressing applied/sterile technique, catheter placed/ultrasound guidance/guidewire recovered

## 2020-08-26 NOTE — H&P ADULT - NSHPSOCIALHISTORY_GEN_ALL_CORE
Tobacco:  Alcohol:  Recreational Drugs: No known EtOH, tobacco or drug use as per family  Lives with daughter

## 2020-08-26 NOTE — H&P ADULT - HISTORY OF PRESENT ILLNESS
Pt is a 69 y/o F with history of HTN and gout presenting w/ generalized weakness. She states the weakness began 2 weeks ago w/ associated fatigue. Pt also reports having dysuria for the past week w/ increased urinary frequency. 2 days ago, patient began to experience R sided hand tremors that were present during rest and movement. Patient denies _____________.     In the ED, pt was afebrile, HR 88, and /100. CMP was significant for elevated glucose at 796 w/ elevated anion gap of 22. Tropes found to be 74. She was given 1L LR bolus. CXR showed clear lungs. CTH, CTA Head and neck ordered, pending results. *** INCOMPLETE H&P ***    Pt is a 71 y/o F with history of HTN and gout presenting w/ generalized weakness. She states the weakness began 2 weeks ago w/ associated fatigue. Pt also reports having dysuria for the past week w/ increased urinary frequency. 2 days ago, patient began to experience R sided hand tremors that were present during rest and movement. Patient denies _____________.     In the ED, pt was afebrile, HR 88, and /100. CMP was significant for elevated glucose at 796 w/ elevated anion gap of 22. Tropes found to be 74. She was given 1L LR bolus. CXR showed clear lungs. CTH, CTA Head and neck ordered, pending results. Pt is a 69 y/o F with history of HTN and gout presenting w/ generalized weakness. In the ED she reported 2 weeks of weakness, 1 week of dysuria and frequency, and 2 days of R sided hand tremors with rest and movement. On interview pt is a poor historian. She denies having prior diabetes diagnosis and unsure when she last saw a doctor. Reports ~1 week of R arm twitching, but does not recall any other symptoms. On interview pt intermittently having R arm twitting, but mental intact answering questions appropriately during episodes  In the ED, pt was afebrile, HR 88, and /102. CMP was significant for elevated glucose at 796 w/ elevated anion gap of 22, bicarb 21. Trop T 74. She was given 1L LR bolus. CXR showed clear lungs. CTH showing possible meningioma. Pt started on insulin gtt 5.7u/hr with rapid correction of glucose to 796 -> 653 -> 562. Given labetalol IV 10mg x2, 20mg x1 for HTN urgency in the ED  On arrival to MICU pt was seizing and nonresponsive with diffuse myoclonus. 4mg IV ativan pushed with improved myoclonus, however pt still biting down. Given 100mcg fent and 100mg prop for RSI. Started on levo, prop and versed gtts. Pt is a 69 y/o F with history of HTN and gout presenting w/ generalized weakness. In the ED she reported 2 weeks of weakness, 1 week of dysuria and frequency, and 2 days of R sided hand tremors with rest and movement. On interview pt is a poor historian. She denies having prior diabetes diagnosis and unsure when she last saw a doctor. Reports ~1 week of R arm twitching, but does not recall any other symptoms. On interview pt intermittently having R arm twitting, but mental intact answering questions appropriately during episodes  In the ED, pt was afebrile, HR 88, and /102. CMP was significant for elevated glucose at 796 w/ elevated anion gap of 22, bicarb 21. Trop T 74. She was given 1L LR bolus. CXR showed clear lungs. CTH showing possible meningioma. Pt started on insulin gtt 5.7u/hr with rapid correction of glucose to 796 -> 653 -> 562. Given labetalol IV 10mg x2, 20mg x1 for HTN urgency in the ED  On arrival to MICU pt was seizing and nonresponsive with diffuse myoclonus. 4mg IV ativan pushed with improved myoclonus, however pt still biting down. Given 100mcg fent and 100mg prop for RSI. Started on levo, prop and versed gtts.    Collateral obtained as per daughter, pt has had b/l foot swelling for 2wks, and fatigue since Sunday. Daughter first noticed tremors at 8pm today and immediately brought her to the hospital. Pt is a 69 y/o F with history of HTN and gout presenting w/ generalized weakness. In the ED she reported 2 weeks of weakness, 1 week of dysuria and frequency, and 2 days of R sided hand tremors with rest and movement. On interview pt is a poor historian. She denies having prior diabetes diagnosis and unsure when she last saw a doctor. Reports ~1 week of R arm twitching, but does not recall any other symptoms. On interview pt intermittently having R arm twitting, but mental intact answering questions appropriately during episodes  In the ED, pt was afebrile, HR 88, and /102. CMP was significant for elevated glucose at 796 w/ elevated anion gap of 22, bicarb 21. Trop T 74. She was given 1L LR bolus. CXR showed clear lungs. CTH showing possible meningioma. Pt started on insulin gtt 5.7u/hr with rapid correction of glucose to 796 -> 653 -> 562. Given labetalol IV 10mg x2, 20mg x1 for HTN urgency in the ED  On arrival to MICU pt was seizing and nonresponsive with diffuse myoclonus. 4mg IV ativan pushed with improved myoclonus, however pt still biting down. Given 100mcg fent and 100mg prop for RSI. Started on levo, prop and versed gtts.    Collateral obtained as per daughter, pt has had b/l foot swelling for 2wks, and fatigue since Sunday. Daughter first noticed tremors at 8pm today and immediately brought her to the hospital. No prior history of seizures or family Hx of seizures. No recent medication changes.

## 2020-08-26 NOTE — PROGRESS NOTE ADULT - SUBJECTIVE AND OBJECTIVE BOX
CHIEF COMPLAINT:  Patient is a 70y old  Female who presents with a chief complaint of Weakness (26 Aug 2020 03:48)    HPI:  71 yo female with PMHx of HTN, BERKLEY, Giant Cell Arthritis and gout presenting to [facility] ED evening of  with c/o generalized weakness. As per documentation, pt reported weakness x1 week, dysuria and frequency x1 weeks, R sided dystonia/hand tremors with rest and movement about 1 week. Patient and pt's daughter deny prior diagnosis of diabetes however unsure of when she last saw a doctor.   In ED pt afebrile, HR 80s, /102; CMP significant for elevated glucose of 796, elevated AG 22, HCO3 21, BHB 2.3. Pt received 1L LR Bolus, started on insulin gtt with rapid correction of glucose 796 --> 653 --> 562. Pt received Labetalol 10mg IV x2, 20mg x1 for HTN urgency in ED. To note, pt responsive in ED and cooperative to questioning during interview. CTH revealing possible meningioma.   Pt admitted to MICU for further management of Hyperglycemic Hyperosmolar State however upon arrival to MICU pt found unresponsive with generalized seizures vs myoclonus with metabolic encephalopathy requiring intubation for airway protection. Pt received 4mg Ativan x1 and started on propofol gtt and versed gtt for seizure control, Neurology consulted and pt was Keppra loaded and started on 1g q12, and placed on vEEG. Pt momentarily became hypotensive requiring iniation of vasopressors marsha-intubation quickly turned off. Pt received another 1L IVF bolus and maintained on insulin gtt and LR at 250cc/hr.  Interval Events:    REVIEW OF SYSTEMS:  unable to assess ROS as pt intubated and sedated.     OBJECTIVE:  ICU Vital Signs Last 24 Hrs  T(C): 37.5 (26 Aug 2020 12:00), Max: 37.7 (26 Aug 2020 00:53)  T(F): 99.5 (26 Aug 2020 12:00), Max: 99.8 (26 Aug 2020 00:53)  HR: 84 (26 Aug 2020 13:00) (60 - 110)  BP: 117/68 (26 Aug 2020 04:00) (102/71 - 236/116)  BP(mean): 89 (26 Aug 2020 04:00) (82 - 142)  ABP: 183/91 (26 Aug 2020 13:00) (87/65 - 187/97)  ABP(mean): 120 (26 Aug 2020 13:00) (68 - 129)  RR: 14 (26 Aug 2020 12:00) (14 - 18)  SpO2: 98% (26 Aug 2020 13:00) (81% - 100%)    Mode: AC/ CMV (Assist Control/ Continuous Mandatory Ventilation), RR (machine): 14, TV (machine): 500, FiO2: 40, PEEP: 5, ITime: 1, MAP: 10, PIP: 31     @ 07:  -   @ 07:00  --------------------------------------------------------  IN: 1944.8 mL / OUT: 1475 mL / NET: 469.8 mL     @ 07:01  -   @ 13:22  --------------------------------------------------------  IN: 2024.4 mL / OUT: 725 mL / NET: 1299.4 mL      CAPILLARY BLOOD GLUCOSE      POCT Blood Glucose.: 330 mg/dL (26 Aug 2020 12:31)    PHYSICAL EXAM:  Neuro:  sedated while on mechanical ventilator. Pupils 2 mm reactive equal. withdraws from painful stimuli in b/l upper and lower extremities.     Pulm:  orally intubated on mechanical ventilator, RR 14//FiO2 40/PEEP 5. diminished in lower lung fields bases to 1/4 up. Clear throughout, SPO2 98%.      CV:  cardiac monitor sinus without ectopy, + s1/s2, peripheral pulses palpable with radial 2+ bilat, dp/pt 1+/1+ bilat, digits warm to touch with good cap refill < 3 secs      GI/:  abd soft, obese, non distended. non tender, + hypoactive bowel sounds. contreras patent to bsd bladder non distended non palpable    Skin:  warm dry intact. without palpable nodes. without JVD appreciated      HOSPITAL MEDICATIONS:  MEDICATIONS  (STANDING):  chlorhexidine 0.12% Liquid 15 milliLiter(s) Oral Mucosa every 12 hours  chlorhexidine 4% Liquid 1 Application(s) Topical <User Schedule>  dextrose 5%. 1000 milliLiter(s) (50 mL/Hr) IV Continuous <Continuous>  dextrose 50% Injectable 12.5 Gram(s) IV Push once  dextrose 50% Injectable 25 Gram(s) IV Push once  dextrose 50% Injectable 25 Gram(s) IV Push once  enoxaparin Injectable 40 milliGRAM(s) SubCutaneous every 12 hours  insulin regular Infusion 1 Unit(s)/Hr (1 mL/Hr) IV Continuous <Continuous>  lactated ringers. 1000 milliLiter(s) (250 mL/Hr) IV Continuous <Continuous>  levETIRAcetam  IVPB 1000 milliGRAM(s) IV Intermittent every 12 hours  niCARdipine Infusion 5 mG/Hr (25 mL/Hr) IV Continuous <Continuous>  pantoprazole   Suspension 40 milliGRAM(s) Oral daily  potassium chloride   Solution 40 milliEquivalent(s) Oral once  propofol Infusion 50 MICROgram(s)/kG/Min (38.1 mL/Hr) IV Continuous <Continuous>    MEDICATIONS  (PRN):  dextrose 40% Gel 15 Gram(s) Oral once PRN Blood Glucose LESS THAN 70 milliGRAM(s)/deciLiter  glucagon  Injectable 1 milliGRAM(s) IntraMuscular once PRN Glucose <70 milliGRAM(s)/deciLiter  sodium chloride 0.9% lock flush 10 milliLiter(s) IV Push every 1 hour PRN Pre/post blood products, medications, blood draw, and to maintain line patency      LABS:                        12.9   8.96  )-----------( 189      ( 26 Aug 2020 04:56 )             40.9     Hgb Trend: 12.9<--, 14.4<--      146<H>  |  102  |  24<H>  ----------------------------<  499<HH>  3.2<L>   |  24  |  1.07    Ca    8.8      26 Aug 2020 08:39  Phos  3.9       Mg     2.2         TPro  6.7  /  Alb  3.7  /  TBili  0.4  /  DBili  x   /  AST  28  /  ALT  48<H>  /  AlkPhos  93      LIVER FUNCTIONS - ( 26 Aug 2020 04:11 )  Alb: 3.7 g/dL / Pro: 6.7 g/dL / ALK PHOS: 93 U/L / ALT: 48 U/L / AST: 28 U/L / GGT: x           Creatinine Trend: 1.07<--, 1.27<--, 1.12<--, 1.24<--  PT/INR - ( 26 Aug 2020 04:56 )   PT: 11.9 sec;   INR: 1.00 ratio         PTT - ( 26 Aug 2020 04:56 )  PTT:23.7 sec  Urinalysis Basic - ( 26 Aug 2020 00:16 )    Color: Colorless / Appearance: Clear / S.034 / pH: x  Gluc: x / Ketone: Small  / Bili: Negative / Urobili: Negative   Blood: x / Protein: 30 mg/dL / Nitrite: Negative   Leuk Esterase: Small / RBC: 2 /hpf / WBC 10 /HPF   Sq Epi: x / Non Sq Epi: 1 /hpf / Bacteria: Few    Arterial Blood Gas:   @ 09:58  7.46/39/90/27/97/3.8  ABG lactate: --  Arterial Blood Gas:   @ 04:32  7.43/43/129/28/99/3.6  ABG lactate: --    Venous Blood Gas:   @ 04:32  7.40/54/46/33/77  VBG Lactate: 5.4  Venous Blood Gas:   @ 02:56  7.34/47/53/25/82  VBG Lactate: 9.0  Venous Blood Gas:   @ 01:27  7.44/44/42/29/76  VBG Lactate: 3.1    MICROBIOLOGY:   CULTURE RESULTS:                20 @ 00:16  Specimen Source: --  Method Type: --  Gram Stain - RRL: --  Gram Stain - Wound: --  Bacteria: Few  Culture Results: --    Specimen Source:   Method Type:   Gram Stain:   Culture Results:   Bacteria: Bacteria: Few (20 @ 00:16)    RADIOLOGY:  < from: Xray Chest 1 View AP/PA (20 @ 06:28) >  EXAM:  XR CHEST AP OR PA 1V                            PROCEDURE DATE:  2020      INTERPRETATION:  XR CHEST. One view.  INDICATION: intubation and OGT placement  COMPARISON: Same day at 12:05 AM    FINDINGS/  IMPRESSION:  Enteric tube within the stomach. Endotracheal tube 1 cm above the hung. Left IJ central line within SVC..  Clear lungs. No pleural effusion or pneumothorax.  < end of copied text >    EXAM:  CT ANGIO BRAIN (W)AW IC                        EXAM:  CT BRAIN                        EXAM:  CT ANGIO NECK (W)AW IC                      < from: CT Head No Cont (20 @ 01:18) >    IMPRESSION:  NONCONTRAST HEAD CT SCAN:  1. No CT evidence of acute intracranial hemorrhage, mass effect or acute territorial infarct.  2. There is a 1.3 cm lobulated bony density seen posterior to the to the left IAC in the left CP angle, nonspecific, may represent meningioma. Further evaluation with MRI can be obtained..    CT ANGIOGRAPHY NECK: Patent cervical vasculature.  No hemodynamically significant carotid stenosis or flow-limiting vertebral artery stenosis.  No evidence of dissection.    CT ANGIOGRAPHY BRAIN: No vessel occlusion, flow-limiting stenosis or aneurysm is identified about the [place].    < end of copied text >    EKG:    Cindy ABRAMS (ext [phone]) CHIEF COMPLAINT:  Patient is a 70y old  Female who presents with a chief complaint of Weakness (26 Aug 2020 03:48)    HPI:  69 yo female with PMHx of HTN, BERKLEY, GERD, Giant Cell Arthritis and gout presenting to [facility] ED evening of  with c/o generalized weakness. As per documentation, pt reported weakness x1 week, dysuria and frequency x1 weeks, R sided dystonia/hand tremors with rest and movement about 1 week. Patient and pt's daughter deny prior diagnosis of diabetes however unsure of when she last saw a doctor.   In ED pt afebrile, HR 80s, /102; CMP significant for elevated glucose of 796, elevated AG 22, HCO3 21, BHB 2.3. Pt received 1L LR Bolus, started on insulin gtt with rapid correction of glucose 796 --> 653 --> 562. Pt received Labetalol 10mg IV x2, 20mg x1 for HTN urgency in ED. To note, pt responsive in ED and cooperative to questioning during interview. CTH revealing possible meningioma.   Pt admitted to MICU for further management of Hyperglycemic Hyperosmolar State however upon arrival to MICU pt found unresponsive with generalized seizures vs myoclonus with metabolic encephalopathy requiring intubation for airway protection. Pt received 4mg Ativan x1 and started on propofol gtt and versed gtt for seizure control, Neurology consulted and pt was Keppra loaded and started on 1g q12, and placed on vEEG. Pt momentarily became hypotensive requiring iniation of vasopressors marsha-intubation quickly turned off. Pt received another 1L IVF bolus and maintained on insulin gtt and LR at 250cc/hr.    REVIEW OF SYSTEMS:  unable to assess ROS as pt intubated and sedated.     OBJECTIVE:  ICU Vital Signs Last 24 Hrs  T(C): 37.5 (26 Aug 2020 12:00), Max: 37.7 (26 Aug 2020 00:53)  T(F): 99.5 (26 Aug 2020 12:00), Max: 99.8 (26 Aug 2020 00:53)  HR: 84 (26 Aug 2020 13:00) (60 - 110)  BP: 117/68 (26 Aug 2020 04:00) (102/71 - 236/116)  BP(mean): 89 (26 Aug 2020 04:00) (82 - 142)  ABP: 183/91 (26 Aug 2020 13:00) (87/65 - 187/97)  ABP(mean): 120 (26 Aug 2020 13:00) (68 - 129)  RR: 14 (26 Aug 2020 12:00) (14 - 18)  SpO2: 98% (26 Aug 2020 13:00) (81% - 100%)    Mode: AC/ CMV (Assist Control/ Continuous Mandatory Ventilation), RR (machine): 14, TV (machine): 500, FiO2: 40, PEEP: 5, ITime: 1, MAP: 10, PIP: 31     @ 07:01  -   @ 07:00  --------------------------------------------------------  IN: 1944.8 mL / OUT: 1475 mL / NET: 469.8 mL     @ 07:01  -   @ 13:22  --------------------------------------------------------  IN: 2024.4 mL / OUT: 725 mL / NET: 1299.4 mL      CAPILLARY BLOOD GLUCOSE      POCT Blood Glucose.: 330 mg/dL (26 Aug 2020 12:31)    PHYSICAL EXAM:  Neuro:  sedated while on mechanical ventilator. Pupils 2 mm reactive equal. withdraws from painful stimuli in b/l upper and lower extremities.     Pulm:  orally intubated on mechanical ventilator, RR 14//FiO2 40/PEEP 5. diminished in lower lung fields bases to 1/4 up. Clear throughout, SPO2 98%.      CV:  cardiac monitor sinus without ectopy, + s1/s2, peripheral pulses palpable with radial 2+ bilat, dp/pt 1+/1+ bilat, digits warm to touch with good cap refill < 3 secs      GI/:  abd soft, obese, non distended. non tender, + hypoactive bowel sounds. contreras patent to bsd bladder non distended non palpable    Skin:  warm dry intact. without palpable nodes. without JVD appreciated      HOSPITAL MEDICATIONS:  MEDICATIONS  (STANDING):  chlorhexidine 0.12% Liquid 15 milliLiter(s) Oral Mucosa every 12 hours  chlorhexidine 4% Liquid 1 Application(s) Topical <User Schedule>  dextrose 5%. 1000 milliLiter(s) (50 mL/Hr) IV Continuous <Continuous>  dextrose 50% Injectable 12.5 Gram(s) IV Push once  dextrose 50% Injectable 25 Gram(s) IV Push once  dextrose 50% Injectable 25 Gram(s) IV Push once  enoxaparin Injectable 40 milliGRAM(s) SubCutaneous every 12 hours  insulin regular Infusion 1 Unit(s)/Hr (1 mL/Hr) IV Continuous <Continuous>  lactated ringers. 1000 milliLiter(s) (250 mL/Hr) IV Continuous <Continuous>  levETIRAcetam  IVPB 1000 milliGRAM(s) IV Intermittent every 12 hours  niCARdipine Infusion 5 mG/Hr (25 mL/Hr) IV Continuous <Continuous>  pantoprazole   Suspension 40 milliGRAM(s) Oral daily  potassium chloride   Solution 40 milliEquivalent(s) Oral once  propofol Infusion 50 MICROgram(s)/kG/Min (38.1 mL/Hr) IV Continuous <Continuous>    MEDICATIONS  (PRN):  dextrose 40% Gel 15 Gram(s) Oral once PRN Blood Glucose LESS THAN 70 milliGRAM(s)/deciLiter  glucagon  Injectable 1 milliGRAM(s) IntraMuscular once PRN Glucose <70 milliGRAM(s)/deciLiter  sodium chloride 0.9% lock flush 10 milliLiter(s) IV Push every 1 hour PRN Pre/post blood products, medications, blood draw, and to maintain line patency      LABS:                        12.9   8.96  )-----------( 189      ( 26 Aug 2020 04:56 )             40.9     Hgb Trend: 12.9<--, 14.4<--      146<H>  |  102  |  24<H>  ----------------------------<  499<HH>  3.2<L>   |  24  |  1.07    Ca    8.8      26 Aug 2020 08:39  Phos  3.9       Mg     2.2         TPro  6.7  /  Alb  3.7  /  TBili  0.4  /  DBili  x   /  AST  28  /  ALT  48<H>  /  AlkPhos  93      LIVER FUNCTIONS - ( 26 Aug 2020 04:11 )  Alb: 3.7 g/dL / Pro: 6.7 g/dL / ALK PHOS: 93 U/L / ALT: 48 U/L / AST: 28 U/L / GGT: x           Creatinine Trend: 1.07<--, 1.27<--, 1.12<--, 1.24<--  PT/INR - ( 26 Aug 2020 04:56 )   PT: 11.9 sec;   INR: 1.00 ratio         PTT - ( 26 Aug 2020 04:56 )  PTT:23.7 sec  Urinalysis Basic - ( 26 Aug 2020 00:16 )    Color: Colorless / Appearance: Clear / S.034 / pH: x  Gluc: x / Ketone: Small  / Bili: Negative / Urobili: Negative   Blood: x / Protein: 30 mg/dL / Nitrite: Negative   Leuk Esterase: Small / RBC: 2 /hpf / WBC 10 /HPF   Sq Epi: x / Non Sq Epi: 1 /hpf / Bacteria: Few    Arterial Blood Gas:   @ 09:58  7.46/39/90/27/97/3.8  ABG lactate: --  Arterial Blood Gas:   @ 04:32  7.43/43/129/28/99/3.6  ABG lactate: --    Venous Blood Gas:   @ 04:32  7.40/54/46/33/77  VBG Lactate: 5.4  Venous Blood Gas:   @ 02:56  7.34/47/53/25/82  VBG Lactate: 9.0  Venous Blood Gas:   @ 01:27  7.44/44/42/29/76  VBG Lactate: 3.1    MICROBIOLOGY:   CULTURE RESULTS:                20 @ 00:16  Specimen Source: --  Method Type: --  Gram Stain - RRL: --  Gram Stain - Wound: --  Bacteria: Few  Culture Results: --    Specimen Source:   Method Type:   Gram Stain:   Culture Results:   Bacteria: Bacteria: Few (20 @ 00:16)    RADIOLOGY:  < from: Xray Chest 1 View AP/PA (20 @ 06:28) >  EXAM:  XR CHEST AP OR PA 1V                            PROCEDURE DATE:  2020      INTERPRETATION:  XR CHEST. One view.  INDICATION: intubation and OGT placement  COMPARISON: Same day at 12:05 AM    FINDINGS/  IMPRESSION:  Enteric tube within the stomach. Endotracheal tube 1 cm above the hung. Left IJ central line within SVC..  Clear lungs. No pleural effusion or pneumothorax.  < end of copied text >    EXAM:  CT ANGIO BRAIN (W)AW IC                        EXAM:  CT BRAIN                        EXAM:  CT ANGIO NECK (W)AW IC                      < from: CT Head No Cont (20 @ 01:18) >    IMPRESSION:  NONCONTRAST HEAD CT SCAN:  1. No CT evidence of acute intracranial hemorrhage, mass effect or acute territorial infarct.  2. There is a 1.3 cm lobulated bony density seen posterior to the to the left IAC in the left CP angle, nonspecific, may represent meningioma. Further evaluation with MRI can be obtained..    CT ANGIOGRAPHY NECK: Patent cervical vasculature.  No hemodynamically significant carotid stenosis or flow-limiting vertebral artery stenosis.  No evidence of dissection.    CT ANGIOGRAPHY BRAIN: No vessel occlusion, flow-limiting stenosis or aneurysm is identified about the [place].    < end of copied text >    EKG:    Cindy ABRAMS (ext [phone]) CHIEF COMPLAINT:  Patient is a 70y old  Female who presents with a chief complaint of Weakness (26 Aug 2020 03:48)    HPI:  69 yo female with PMHx of HTN, BERKLEY, GERD, Giant Cell Arthritis and gout presenting to [facility] ED evening of  with c/o generalized weakness. As per documentation, pt reported weakness x1 week, dysuria and frequency x1 weeks, R sided dystonia/hand tremors with rest and movement about 1 week. Patient and pt's daughter deny prior diagnosis of diabetes however unsure of when she last saw a doctor.   In ED pt afebrile, HR 80s, /102; CMP significant for elevated glucose of 796, elevated AG 22, HCO3 21, BHB 2.3. Pt received 1L LR Bolus, started on insulin gtt with rapid correction of glucose 796 --> 653 --> 562. Pt received Labetalol 10mg IV x2, 20mg x1 for HTN urgency in ED. To note, pt responsive in ED and cooperative to questioning during interview. CTH revealing possible meningioma.   Pt admitted to MICU for further management of Hyperglycemic Hyperosmolar State however upon arrival to MICU pt found unresponsive with generalized seizures vs myoclonus with metabolic encephalopathy requiring intubation for airway protection. Pt received 4mg Ativan x1 and started on propofol gtt and versed gtt for seizure control, Neurology consulted and pt was Keppra loaded and started on 1g q12, and placed on vEEG. Pt momentarily became hypotensive requiring iniation of vasopressors marsha-intubation quickly turned off. Pt received another 1L IVF bolus and maintained on insulin gtt and LR at 250cc/hr.    REVIEW OF SYSTEMS:  unable to assess ROS as pt intubated and sedated.     OBJECTIVE:  ICU Vital Signs Last 24 Hrs  T(C): 37.5 (26 Aug 2020 12:00), Max: 37.7 (26 Aug 2020 00:53)  T(F): 99.5 (26 Aug 2020 12:00), Max: 99.8 (26 Aug 2020 00:53)  HR: 84 (26 Aug 2020 13:00) (60 - 110)  BP: 117/68 (26 Aug 2020 04:00) (102/71 - 236/116)  BP(mean): 89 (26 Aug 2020 04:00) (82 - 142)  ABP: 183/91 (26 Aug 2020 13:00) (87/65 - 187/97)  ABP(mean): 120 (26 Aug 2020 13:00) (68 - 129)  RR: 14 (26 Aug 2020 12:00) (14 - 18)  SpO2: 98% (26 Aug 2020 13:00) (81% - 100%)    Mode: AC/ CMV (Assist Control/ Continuous Mandatory Ventilation), RR (machine): 14, TV (machine): 500, FiO2: 40, PEEP: 5, ITime: 1, MAP: 10, PIP: 31     @ 07:  -   @ 07:00  --------------------------------------------------------  IN: 1944.8 mL / OUT: 1475 mL / NET: 469.8 mL     @ 07:01  -   @ 13:22  --------------------------------------------------------  IN: 2024.4 mL / OUT: 725 mL / NET: 1299.4 mL      CAPILLARY BLOOD GLUCOSE      POCT Blood Glucose.: 330 mg/dL (26 Aug 2020 12:31)    PHYSICAL EXAM:  Neuro:  sedated while on mechanical ventilator. Pupils 2 mm reactive equal. withdraws from painful stimuli in b/l upper and lower extremities.     Pulm:  orally intubated on mechanical ventilator, RR 14//FiO2 40/PEEP 5. diminished in lower lung fields bases to 1/4 up. Clear throughout, SPO2 98%.      CV:  cardiac monitor sinus without ectopy, + s1/s2, peripheral pulses palpable with radial 2+ bilat, dp/pt 1+/1+ bilat, digits warm to touch with good cap refill < 3 secs      GI/:  abd soft, obese, non distended. non tender, + hypoactive bowel sounds. contreras patent to bsd bladder non distended non palpable    Skin:  warm dry intact. without palpable nodes. without JVD appreciated    Lines: L Radial Artery A-line (), L IJ TLC ()    HOSPITAL MEDICATIONS:  MEDICATIONS  (STANDING):  chlorhexidine 0.12% Liquid 15 milliLiter(s) Oral Mucosa every 12 hours  chlorhexidine 4% Liquid 1 Application(s) Topical <User Schedule>  dextrose 5%. 1000 milliLiter(s) (50 mL/Hr) IV Continuous <Continuous>  dextrose 50% Injectable 12.5 Gram(s) IV Push once  dextrose 50% Injectable 25 Gram(s) IV Push once  dextrose 50% Injectable 25 Gram(s) IV Push once  enoxaparin Injectable 40 milliGRAM(s) SubCutaneous every 12 hours  insulin regular Infusion 1 Unit(s)/Hr (1 mL/Hr) IV Continuous <Continuous>  lactated ringers. 1000 milliLiter(s) (250 mL/Hr) IV Continuous <Continuous>  levETIRAcetam  IVPB 1000 milliGRAM(s) IV Intermittent every 12 hours  niCARdipine Infusion 5 mG/Hr (25 mL/Hr) IV Continuous <Continuous>  pantoprazole   Suspension 40 milliGRAM(s) Oral daily  potassium chloride   Solution 40 milliEquivalent(s) Oral once  propofol Infusion 50 MICROgram(s)/kG/Min (38.1 mL/Hr) IV Continuous <Continuous>    MEDICATIONS  (PRN):  dextrose 40% Gel 15 Gram(s) Oral once PRN Blood Glucose LESS THAN 70 milliGRAM(s)/deciLiter  glucagon  Injectable 1 milliGRAM(s) IntraMuscular once PRN Glucose <70 milliGRAM(s)/deciLiter  sodium chloride 0.9% lock flush 10 milliLiter(s) IV Push every 1 hour PRN Pre/post blood products, medications, blood draw, and to maintain line patency      LABS:                        12.9   8.96  )-----------( 189      ( 26 Aug 2020 04:56 )             40.9     Hgb Trend: 12.9<--, 14.4<--      146<H>  |  102  |  24<H>  ----------------------------<  499<HH>  3.2<L>   |  24  |  1.07    Ca    8.8      26 Aug 2020 08:39  Phos  3.9       Mg     2.2         TPro  6.7  /  Alb  3.7  /  TBili  0.4  /  DBili  x   /  AST  28  /  ALT  48<H>  /  AlkPhos  93  08-    LIVER FUNCTIONS - ( 26 Aug 2020 04:11 )  Alb: 3.7 g/dL / Pro: 6.7 g/dL / ALK PHOS: 93 U/L / ALT: 48 U/L / AST: 28 U/L / GGT: x           Creatinine Trend: 1.07<--, 1.27<--, 1.12<--, 1.24<--  PT/INR - ( 26 Aug 2020 04:56 )   PT: 11.9 sec;   INR: 1.00 ratio         PTT - ( 26 Aug 2020 04:56 )  PTT:23.7 sec  Urinalysis Basic - ( 26 Aug 2020 00:16 )    Color: Colorless / Appearance: Clear / S.034 / pH: x  Gluc: x / Ketone: Small  / Bili: Negative / Urobili: Negative   Blood: x / Protein: 30 mg/dL / Nitrite: Negative   Leuk Esterase: Small / RBC: 2 /hpf / WBC 10 /HPF   Sq Epi: x / Non Sq Epi: 1 /hpf / Bacteria: Few    Arterial Blood Gas:   @ 09:58  7.46/39/90/27/97/3.8  ABG lactate: --  Arterial Blood Gas:   @ 04:32  7.43/43/129/28/99/3.6  ABG lactate: --    Venous Blood Gas:   @ 04:32  7.40/54/46/33/77  VBG Lactate: 5.4  Venous Blood Gas:   @ 02:56  7.34/47/53/25/82  VBG Lactate: 9.0  Venous Blood Gas:   @ 01:27  7.44/44/42/29/76  VBG Lactate: 3.1    MICROBIOLOGY:   CULTURE RESULTS:                20 @ 00:16  Specimen Source: --  Method Type: --  Gram Stain - RRL: --  Gram Stain - Wound: --  Bacteria: Few  Culture Results: --    Specimen Source:   Method Type:   Gram Stain:   Culture Results:   Bacteria: Bacteria: Few (20 @ 00:16)    RADIOLOGY:  < from: Xray Chest 1 View AP/PA (20 @ 06:28) >  EXAM:  XR CHEST AP OR PA 1V                            PROCEDURE DATE:  2020      INTERPRETATION:  XR CHEST. One view.  INDICATION: intubation and OGT placement  COMPARISON: Same day at 12:05 AM    FINDINGS/  IMPRESSION:  Enteric tube within the stomach. Endotracheal tube 1 cm above the hung. Left IJ central line within SVC..  Clear lungs. No pleural effusion or pneumothorax.  < end of copied text >    EXAM:  CT ANGIO BRAIN (W)AW IC                        EXAM:  CT BRAIN                        EXAM:  CT ANGIO NECK (W)AW IC                      < from: CT Head No Cont (20 @ 01:18) >    IMPRESSION:  NONCONTRAST HEAD CT SCAN:  1. No CT evidence of acute intracranial hemorrhage, mass effect or acute territorial infarct.  2. There is a 1.3 cm lobulated bony density seen posterior to the to the left IAC in the left CP angle, nonspecific, may represent meningioma. Further evaluation with MRI can be obtained..    CT ANGIOGRAPHY NECK: Patent cervical vasculature.  No hemodynamically significant carotid stenosis or flow-limiting vertebral artery stenosis.  No evidence of dissection.    CT ANGIOGRAPHY BRAIN: No vessel occlusion, flow-limiting stenosis or aneurysm is identified about the [place].    < end of copied text >    EKG:    Cindy ABRAMS (ext [phone])

## 2020-08-26 NOTE — H&P ADULT - NSHPREVIEWOFSYSTEMS_GEN_ALL_CORE
REVIEW OF SYSTEMS:     CONSTITUTIONAL: +fatigue/weakness, No fevers or chills  EYES: no blurry vision or eye pain.   ENT: No throat pain. No dysphagia.    NECK: No pain or stiffness  RESPIRATORY: No cough, wheezing, hemoptysis; No shortness of breath  CARDIOVASCULAR: No chest pain or palpitations.  GASTROINTESTINAL: +mild abdominal pain. No N/V. No diarrhea or constipation. No melena or hematochezia.  GENITOURINARY: +dysuria, +frequency No hematuria  NEUROLOGICAL: No numbness or weakness. No dizziness or falls.   SKIN: No itching, burning, rashes, or lesions.   LYMPHATIC: No masses or swelling.   All other review of systems is negative unless indicated above.

## 2020-08-26 NOTE — H&P ADULT - NSICDXPASTMEDICALHX_GEN_ALL_CORE_FT
PAST MEDICAL HISTORY:  GCA (giant cell arteritis)     GERD (gastroesophageal reflux disease)     Glaucoma     Gout     H/O seasonal allergies     HTN (hypertension)     Morbid obesity     OA (osteoarthritis)     BERKLEY (obstructive sleep apnea) possible    Shoulder pain radiating to neck

## 2020-08-26 NOTE — PROGRESS NOTE ADULT - ATTENDING COMMENTS
Patient with history of htn and gout hear with weakness/polyuria/polydipsia and RUE twitching found to have HHS, lactic acidosis requiring insulin gtt, then had seizures and acute hypoxic respiratory failure due to seizures requiring intubation. Unclear etiology of seizures - do not suspect meningitis as she was awake, talking, and without pain prior to intubation overnight.  Possible due to overcorrection of sugars?  Will c/w insulin gtt, wean sedation as EEG shows no seizures thus far, c/w AED per neuro and wean at some point.  Can c/w abx for possible aspiration, but will f/u cultures.  Monitor bmps.  c/w IVF. Patient with history of htn and gout hear with weakness/polyuria/polydipsia and RUE twitching found to have HHS, lactic acidosis requiring insulin gtt, then had seizures and acute hypoxic respiratory failure due to seizures requiring intubation. Unclear etiology of seizures - do not suspect meningitis as she was awake, talking, and without pain prior to intubation overnight.  Possible due to overcorrection of sugars?  Will c/w insulin gtt, wean sedation as EEG shows no seizures thus far, c/w AED per neuro and wean at some point.  Can c/w abx for possible aspiration, but will f/u cultures.  Monitor bmps.  c/w IVF. will start cardene gtt goal -160.

## 2020-08-26 NOTE — PROGRESS NOTE ADULT - ASSESSMENT
Assessment: 71 yo female with PMHx of HTN, BERKLEY, GERD, Giant cell Arthritis, Gout presents to [facility] 8/25 with c/o dysuria, urinary frequency, right upper extremity tremors/dystonia x1-2 weeks found to be in hypertensive urgency to SBP 200s and Hyperglycemic Hyperosmolar State () accepted to MICU for further management c/b generalized seizure/AMS most likely d/t rapid correction of blood sugar and osmolar shift requiring intubation for airway protection.    Plan:    #Neuro: Generalized seizure/Encephalopathy likely d/t rapid correction of blood sugar with osmolar shift  - no h/o seizure like activity as per family, no new medications/toxins, signs of infection or other inducing factors evident at this time  - s/p Ativan 4mg IV x1, Started on Propofol gtt and Versed gtt with clinical improvement of seizure like activity  - will wean off versed gtt for now and monitor on propofol gtt only  - s/p Keppra IV load, continued on 1g q12 for now   - vEEG placement, f/u read  - CTH with possibly meningioma, as per Neurology no need for f/u CTH at this time, consider MRI in near future however vEEG takes precedence at present  - f/u Neurology recommendations   - continue neuro checks as per protocol  - continue seizure precautions     #Pulm: Acute hypoxemic Respiratory Failure s/p intubation for airway protection i/s/o AMS/generalized seizure activity   - continue ventilator support for now, on minimal vent settings 14/500/40/5  - continue to wean FiO2 as tolerated     #CV: Hypertensive urgency () s/p total Labetalol 40 IV  - on lisinopril at home   - will start Cardene gtt for a goal of SBP 140s-160s  - will consider titrating off gtt and initiating PO/IV meds after weaning of sedation   - f/u TTE    #GI/: No active issues  - dysuria/urinary frequency likely i/s/o HHS  - NPO for now while on insulin gtt, will start tube feeds when transitioning regimen    #ID: Afebrile, no leukocytosis at present  - started on Ceftriaxone 2g qd for empiric meningitis coverage  - will de-escalate to Ceftriaxone 1g qd for empiric aspiration/PNA coverage and f/u culture results  - send sputum culture   - consider LP if no improvement in mental status after weaning sedation/no evidence of seizure like activity and resolution of HHS    #FEN/ENDO: Hyperglycemic Hyperosmolar State  - p/w , AG 22, HCO3 21, BHB 2.3, pH 7.44  - p/w  corrected to 562 about 2.5 hrs s/p initiation of insulin gtt  - will maintain on insulin gtt and LR at 250cc/hr  - Endocrine consult   - f/u BMP/Mg/Phos q4 hrs for now  - continue Potassium repletion   Hypernatremia, corrected Na 145--> 147 --> 152  - will likely add D5W to IVF when FS <300, will f/u BMP q4 hrs     #HEME:  No active issues  - DVT ppx with Lovenox 40q12 Assessment: 71 yo female with PMHx of HTN, BERKLEY, GERD, Giant cell Arthritis, Gout presents to [facility] 8/25 with c/o dysuria, urinary frequency, right upper extremity tremors/dystonia x1-2 weeks found to be in hypertensive urgency to SBP 200s and Hyperglycemic Hyperosmolar State () accepted to MICU for further management c/b generalized seizure/AMS most likely d/t rapid correction of blood sugar and osmolar shift requiring intubation for airway protection.    Plan:    #Neuro: Generalized seizure/Encephalopathy likely d/t rapid correction of blood sugar with osmolar shift  - no h/o seizure like activity as per family, no new medications/toxins, signs of infection or other inducing factors evident at this time  - on Cyclobenzaprine 10mg q8 and Hydroxyzine Hydrochloride 20 mg qd at home  - s/p Ativan 4mg IV x1, Started on Propofol gtt and Versed gtt with clinical improvement of seizure like activity  - will wean off versed gtt for now and monitor on propofol gtt only  - s/p Keppra IV load, continued on 1g q12 for now   - vEEG placement, f/u read  - CTH with possibly meningioma, as per Neurology no need for f/u CTH at this time, consider MRI in near future however vEEG takes precedence at present  - f/u Neurology recommendations   - continue neuro checks as per protocol  - continue seizure precautions     #Pulm: Acute hypoxemic Respiratory Failure s/p intubation for airway protection i/s/o AMS/generalized seizure activity   - continue ventilator support for now, on minimal vent settings 14/500/40/5  - continue to wean FiO2 as tolerated     #CV: Hypertensive urgency () s/p total Labetalol 40 IV  - on lisinopril at home   - will start Cardene gtt for a goal of SBP 140s-160s  - will consider titrating off gtt and initiating PO/IV meds after weaning of sedation   - f/u TTE    #GI/: No active issues  - dysuria/urinary frequency likely i/s/o HHS  - NPO for now while on insulin gtt, will start tube feeds when transitioning regimen    #ID: Afebrile, no leukocytosis at present  - started on Ceftriaxone 2g qd for empiric meningitis coverage  - will de-escalate to Ceftriaxone 1g qd for empiric aspiration/PNA coverage and f/u culture results  - send sputum culture   - consider LP if no improvement in mental status after weaning sedation/no evidence of seizure like activity and resolution of HHS    #FEN/ENDO: Hyperglycemic Hyperosmolar State  - p/w , AG 22, HCO3 21, BHB 2.3, pH 7.44  - p/w  corrected to 562 about 2.5 hrs s/p initiation of insulin gtt  - will maintain on insulin gtt and LR at 250cc/hr  - Endocrine consult   - f/u BMP/Mg/Phos q4 hrs for now  - continue Potassium repletion   - f/u Hgb A1C  Hypernatremia, corrected Na 145--> 147 --> 152  - will likely add D5W to IVF when FS <300, will f/u BMP q4 hrs     #HEME:  No active issues  - DVT ppx with Lovenox 40q12 Assessment: 69 yo female with PMHx of HTN, BERKLEY, GERD, Giant cell Arthritis, Gout presents to [facility] 8/25 with c/o dysuria/polyuria/polydipsia, right upper extremity tremors/dystonia x1-2 weeks found to be in hypertensive urgency to SBP 200s and Hyperglycemic Hyperosmolar State () and Lactic Acidosis accepted to MICU for further management c/b generalized seizure/AMS of unclear etiology, most likely d/t rapid correction of blood sugar and osmolar shift requiring intubation for airway protection.    Plan:    #Neuro: Generalized seizure/Encephalopathy of unclear etiology? likely d/t rapid correction of blood sugar with osmolar shift  - no h/o seizure like activity as per family, no new medications/toxins, signs of infection or other inducing factors evident at this time  - on Cyclobenzaprine 10mg q8 and Hydroxyzine Hydrochloride 20 mg qd at home  - s/p Ativan 4mg IV x1, Started on Propofol gtt and Versed gtt with clinical improvement of seizure like activity  - will wean off versed gtt for now and monitor on propofol gtt only  - s/p Keppra IV load, continued on 1g q12 for now   - vEEG placement, f/u read  - CTH with possibly meningioma, as per Neurology no need for f/u CTH at this time, consider MRI in near future however vEEG takes precedence at present  - f/u Neurology recommendations   - continue neuro checks as per protocol  - continue seizure precautions     #Pulm: Acute hypoxemic Respiratory Failure s/p intubation for airway protection i/s/o AMS/generalized seizure activity   - continue ventilator support for now, on minimal vent settings 14/500/40/5  - continue to wean FiO2 as tolerated     #CV: Hypertensive urgency () s/p total Labetalol 40 IV  - on lisinopril at home   - will start Cardene gtt for a goal of SBP 140s-160s  - will consider titrating off gtt and initiating PO/IV meds after weaning of sedation   - f/u TTE    #GI/: No active issues  - dysuria/urinary frequency likely i/s/o HHS  - NPO for now while on insulin gtt, will start tube feeds when transitioning regimen    #ID: Afebrile, no leukocytosis at present  - started on Ceftriaxone 2g qd for empiric meningitis coverage  - will de-escalate to Ceftriaxone 1g qd for empiric aspiration/PNA coverage and f/u culture results  - send sputum culture   - consider LP if no improvement in mental status after weaning sedation/no evidence of seizure like activity and resolution of HHS    #FEN/ENDO: Hyperglycemic Hyperosmolar State  - p/w , AG 22, HCO3 21, BHB 2.3, pH 7.44  - p/w  corrected to 562 about 2.5 hrs s/p initiation of insulin gtt  - will maintain on insulin gtt and LR at 250cc/hr  - Endocrine consult   - f/u BMP/Mg/Phos q4 hrs for now  - continue Potassium repletion   - f/u Hgb A1C  Hypernatremia, corrected Na 145--> 147 --> 152  - will likely add D5W to IVF when FS <300, will f/u BMP q4 hrs   Lactic Acidosis  - now improved    #HEME:  No active issues  - DVT ppx with Lovenox 40q12

## 2020-08-26 NOTE — CONSULT NOTE ADULT - ASSESSMENT
INCOMPLETE  Assessment:  70y R-handed F with h/o glaucoma, morbid obesity, chronic shoulder pain, gout, B/L LE weakness with knee issues, GCA and HTN who p/w 2 weeks of generalized weakness and 2 days of R. arm and leg tremor.  While in ED, several episodes lasting 30-60 seconds noted with painful spasm of neck to right a/w 1-3 Hz low amplitude shaking of R. arm and R. leg with preserved awareness and consciousness w/o tongue biting, gaze deviation, incontinence or post event confusion.  PT was also notably hypertensive and there was initial concern for vascular induced L. sided lesion causing acute new onset dystonia.  Upon further lab review, PT found to be in hyperosmolar hyperglycemic state.  Per ED and MICU team, PT continued to have several of these episodes but then had a generalized shaking event with acute stupor and dilated pupils.  Due to concern for generalized seizures, PT was given ativan x2, intubated for airway protection and started on propofol gtt.    Impression: Acute hyperglycemia induced dystonia with tremor vs. possible EPC and then subsequent generalized seizure.  Less concerned for structural lesion at this time, more likely provoked events from hyperglycemia.     Plan  [] 20 mg/kg IV loading dose Keppra now, then 1 g IV BID for now.  Can taper off when more stable and offending provocation of hyperglycemia improved  [] Will eventually have goal of weaning back sedation  [] Ativan 2mg IVP for any GTC and/or focal seizure >3 min.   [] STAT vEEG 24 hr  [] MRI brain w/wo contrast to ensure no other provoking factors.     Lucien Vasquez, DO  PGY-3 Neurology Service Assessment:  70y R-handed F with h/o glaucoma, morbid obesity, chronic shoulder pain, gout, B/L LE weakness with knee issues, GCA and HTN who p/w 2 weeks of generalized weakness and 2 days of R. arm and leg tremor.  While in ED, several episodes lasting 30-60 seconds noted with painful spasm of neck to right a/w 1-3 Hz low amplitude shaking of R. arm and R. leg with preserved awareness and consciousness w/o tongue biting, gaze deviation, incontinence or post event confusion.  Attempted sensory trick/ geste antagoniste and did not improve episodes. PT was also notably hypertensive to 206/102 and there was initial concern for vascular induced vs HTN induced L. sided lesion causing acute new onset right sided dystonia.  Upon further lab review, PT found to be in hyperosmolar hyperglycemic state w/ glucose >700.  Per ED and MICU team, PT continued to have several of these episodes but then had two generalized clonic events with acute stupor and dilated pupils lasting up to 2 minutes.  Due to concern for GTCs, PT was given ativan x2, intubated for airway protection and started on propofol and versed gtt.     08/26/20 CT Head:   1. No CT evidence of acute intracranial hemorrhage, mass effect or acute territorial infarct.  2. There is a 1.3 cm lobulated bony density seen posterior to the to the left IAC in the left CP angle, nonspecific, may represent meningioma. Further evaluation with MRI can be obtained..  08/26/20 CTA NECK: Patent cervical vasculature.  No hemodynamically significant carotid stenosis or flow-limiting vertebral artery stenosis.  No evidence of dissection.  08/26/20 CTA BRAIN: No vessel occlusion, flow-limiting stenosis or aneurysm is identified about the [place].    Impression: Acute hyperglycemia induced dystonia with tremor vs. possible R. sided EPC from left brain dysfunction and then subsequent generalized seizure.  Less concerned for structural lesion at this time, more likely provoked events from hyperglycemia.     Plan  [] 20 mg/kg IV loading dose Keppra now, then 1 g IV BID for now.  Can taper off when more stable and offending provocation of hyperglycemia improved.  Unlikely to need long-term AED unless structural lesion noted to explain possible seizures.   [] Will eventually have goal of weaning back sedation  [] Ativan 2mg IVP for any GTC and/or focal seizure >3 min.   [] Seizure precautions  [] STAT vEEG 24 hr  [] MRI brain w/wo contrast to ensure no other provoking factors.   [] Continue ASA 81 daily, atorvastatin 80 with concern of possible stroke as new onset focal lesion.  [] Send CMP, GGT, NH3, Mg, TSH.     Lucien Vasquez, DO  PGY-3 Neurology Service

## 2020-08-26 NOTE — H&P ADULT - ATTENDING COMMENTS
70F Hx Morbid Obesity, BERKLEY, GERD, Gout, Giant Cell Arthritis p/w ED for Dysuria, Urinary Frequency, Rt sided Dystonia and Tremors at rest in Hyperglycemic Hyperosmolar State (FZ=173 mg/d/) accepted to ICU but upon arrival in Generalized Seizure vs. Myoclonus with Metabolic Encephalopathy required intubation for airway protection and aggressive seizure control.  1. Acute Hypoxic Respiratory Failure s/p intubation in ICU on ventilator support; F/U CXR and ABG   2. Generalized Seizure with Altered Mental Status most likely form rapid correction of Blood sugar and Osmolar Shift now on Propofol and Versed gtt for sedation and IV Keppra Loading for seizure vEEG monitoring   3. Hemodynamically stable but received total 40 mg Labetalol IV Push for Hypertensive Urgency  4. Empiric ABx Coverage for Aspiration, Urosepsis and Meningitis   5. I & O monitoring, NGT Insertion for Enteral Feeding Plan   6. Neurology and vEEG consult as needed

## 2020-08-26 NOTE — ED ADULT NURSE REASSESSMENT NOTE - NS ED NURSE REASSESS COMMENT FT1
pt resting in room, pt alert and oriented x3, speaking full clear sentences, respirations non-labored, skin warm dry and intact, pt denies any pain at this time, blood pressure responding to 2nd dose of labetolol. micu at bedside assessing pt at 0205. rn will continue to monitor.

## 2020-08-26 NOTE — PROCEDURE NOTE - SUPERVISORY STATEMENT
I personally performed the entire procedure from airway examination to ETT security after intubation with the assists of ICU Nursing Staffs and Respiratory Therapists.

## 2020-08-26 NOTE — H&P ADULT - NSICDXPASTSURGICALHX_GEN_ALL_CORE_FT
PAST SURGICAL HISTORY:  S/P appendectomy     S/P arthroscopic knee surgery bilateral    S/P hysterectomy julianna    S/P tubal ligation

## 2020-08-26 NOTE — CONSULT NOTE ADULT - SUBJECTIVE AND OBJECTIVE BOX
NEUROLOGY CONSULT   HPI: 70y R-handed woman with a  h/o glaucoma, morbid obesity, chronic shoulder pain, gout, B/L LE weakness with knee issues, GCA and HTN who presented on 20 with 2 weeks of generalized weakness and 2 days of R. arm and leg tremor for which neurology was consulted.  PT notes at baseline she has had decreased mobility due to knee pain but in last two weeks had significant worsening of B/L LE weakness.  On 20 PT suddenly started noticing uncontrolled spasms and tremors of R. arm and leg.  Episodes are painful, worse with movement, a/w neck spasm to the right and can last between 30 seconds and a few minutes.  She is conscious during the entire episode, has no impaired awareness, amnesia, tongue bite or incontinence.  No h/o seizures, dystonia, PD or any other movement disorder in herself or family.  PT notes she recently increased lisinopril, but no other medication changes.  Denies any neck trauma or falls.      ROS: A 10-system ROS was performed and is negative except for those items noted above.     PMH:  Glaucoma  Morbid obesity  Shoulder pain  OA (osteoarthritis)  Gout  BERKLEY (obstructive sleep apnea)  H/O seasonal allergies  GERD (gastroesophageal reflux disease)  GCA (giant cell arteritis)  HTN (hypertension)      Home Medications:  allopurinol 100 mg oral tablet: 1 tab(s) orally 2 times a day (26 Aug 2020 04:57)  Alphagan: 1 drop(s) to each affected eye 2 times a day (26 Aug 2020 04:57)  cyclobenzaprine 10 mg oral tablet: 1 tab(s) orally 3 times a day (26 Aug 2020 04:57)  hydrOXYzine hydrochloride 10 mg oral tablet: 2 tab(s) orally once a day, As Needed (26 Aug 2020 04:57)  lisinopril 40 mg oral tablet: 1 tab(s) orally once a day (at bedtime) (26 Aug 2020 04:57)    MEDICATIONS  (STANDING):  cefTRIAXone   IVPB 2000 milliGRAM(s) IV Intermittent every 12 hours  chlorhexidine 0.12% Liquid 15 milliLiter(s) Oral Mucosa every 12 hours  chlorhexidine 4% Liquid 1 Application(s) Topical <User Schedule>  dextrose 5%. 1000 milliLiter(s) (50 mL/Hr) IV Continuous <Continuous>  dextrose 50% Injectable 12.5 Gram(s) IV Push once  dextrose 50% Injectable 25 Gram(s) IV Push once  dextrose 50% Injectable 25 Gram(s) IV Push once  enoxaparin Injectable 30 milliGRAM(s) SubCutaneous two times a day  insulin regular Infusion 3 Unit(s)/Hr (3 mL/Hr) IV Continuous <Continuous>  lactated ringers. 1000 milliLiter(s) (250 mL/Hr) IV Continuous <Continuous>  levETIRAcetam  IVPB 1500 milliGRAM(s) IV Intermittent once  midazolam Infusion 0.02 mG/kG/Hr (2.54 mL/Hr) IV Continuous <Continuous>  potassium chloride  20 mEq/100 mL IVPB 20 milliEquivalent(s) IV Intermittent once  sodium chloride 0.9% Bolus 1000 milliLiter(s) IV Bolus once    MEDICATIONS  (PRN):  dextrose 40% Gel 15 Gram(s) Oral once PRN Blood Glucose LESS THAN 70 milliGRAM(s)/deciLiter  glucagon  Injectable 1 milliGRAM(s) IntraMuscular once PRN Glucose <70 milliGRAM(s)/deciLiter      ALLERGIES: No Known Allergies      PSH:   S/P tubal ligation  OA (osteoarthritis)  S/P appendectomy  S/P arthroscopic knee surgery  S/P hysterectomy      Social History:    FH:      OBJECTIVE  Vital Signs Last 24 Hrs  T(C): 37.7 (26 Aug 2020 00:53), Max: 37.7 (26 Aug 2020 00:53)  T(F): 99.8 (26 Aug 2020 00:53), Max: 99.8 (26 Aug 2020 00:53)  HR: 67 (26 Aug 2020 04:00) (67 - 110)  BP: 117/68 (26 Aug 2020 04:00) (102/71 - 236/116)  BP(mean): 89 (26 Aug 2020 04:00) (82 - 142)  RR: 14 (26 Aug 2020 04:00) (14 - 18)  SpO2: 99% (26 Aug 2020 04:00) (81% - 100%)  I&O's Summary    25 Aug 2020 07:01  -  26 Aug 2020 05:08  --------------------------------------------------------  IN: 0 mL / OUT: 1000 mL / NET: -1000 mL        PHYSICAL EXAM:  General: female appears stated age, in NAD  Cardiac: S1, S2 normal. RRR with regular pulse.  No murmurs, rubs or gallops.  Respiratory: CTA throughout with good air entry.  MSK: No erythema, tenderness or deformities.   Skin: No rashes, lesions or color changes.  Neurologic:  Mental Status: Awake, alert, oriented to person, place, situation, time. Normal affect. Follows all commands.    Language: Speech is clear, fluent with preserved naming, repetition and comprehension.      Cranial Nerves: PERRLA (R = 3mm, L = 3mm). Visual fields intact. EOMI no nystagmus. V1-3 intact to light touch.  No facial asymmetry b/l, full eye closure strength b/l. Hearing grossly normal to conversation.  Symmetric palate elevation in midline.  Head turning & shoulder shrug intact b/l. Tongue midline, normal movements, no atrophy.  Motor: Normal muscle bulk & tone. No noticeable tremor, myoclonus or pronator drift. ***/5 strength.	  Sensation: Symmetric B/L preserved sensation to light touch, pin prick, position.    Cortical: No extinction on double simultaneous touch and no signs of neglect.   Reflexes: ***/4.  Plantar Responses are ***.   Coordination: Intact rapid-alternating movements. No dysmetria on finger-to-nose or heel-to-shin.  No dysdiadodyskinesia  Gait: Normal stance, stride length, touch off, arm swing and sharyn.   Normal Romberg. No postural instability.     Psychiatric: Normal mood and congruent affect.     While in ED, several episodes lasting 30-60 seconds noted with painful spasm of neck to right a/w 1-3 Hz low amplitude shaking of R. arm and R. leg with preserved awareness and consciousness w/o tongue biting, gaze deviation, incontinence or post event confusion.  PT was also notably hypertensive and there was initial concern for vascular induced L. sided lesion causing acute new onset dystonia.  Upon further lab review, PT found to be in hyperosmolar hyperglycemic state.  Per ED and MICU team, PT continued to have several of these episodes but then had a generalized shaking event with acute stupor and dilated pupils.  Due to concern for generalized seizures, PT was given ativan x2, intubated for airway protection and started on propofol gtt.              CBC:                        14.4   10.88 )-----------( 177      ( 25 Aug 2020 22:43 )             44.5       CMP:      139  |  94<L>  |  26<H>  ----------------------------<  591<HH>  7.1<HH>   |  20<L>  |  1.12    Ca    9.7      26 Aug 2020 02:56  Phos  3.3       Mg     2.4         TPro  7.7  /  Alb  3.7  /  TBili  0.5  /  DBili  x   /  AST  60<H>  /  ALT  56<H>  /  AlkPhos  118      LIVER FUNCTIONS - ( 25 Aug 2020 22:43 )  Alb: 3.7 g/dL / Pro: 7.7 g/dL / ALK PHOS: 118 U/L / ALT: 56 U/L / AST: 60 U/L / GGT: x             COAGS:      UA:  Urinalysis Basic - ( 26 Aug 2020 00:16 )    Color: Colorless / Appearance: Clear / S.034 / pH: x  Gluc: x / Ketone: Small  / Bili: Negative / Urobili: Negative   Blood: x / Protein: 30 mg/dL / Nitrite: Negative   Leuk Esterase: Small / RBC: 2 /hpf / WBC 10 /HPF   Sq Epi: x / Non Sq Epi: 1 /hpf / Bacteria: Few        Cardiac:        ABG:  ABG - ( 26 Aug 2020 04:32 )  pH, Arterial: 7.43  pH, Blood: x     /  pCO2: 43    /  pO2: 129   / HCO3: 28    / Base Excess: 3.6   /  SaO2: 99                  MICRO/CULTURES:        Neuro LABS    CSF Results:       Imaging/Studies:    CT Angio Head w/ IV Cont:   EXAM:  CT ANGIO BRAIN (W)AW IC                          EXAM:  CT BRAIN                          EXAM:  CT ANGIO NECK (W)AW IC                            PROCEDURE DATE:  2020            INTERPRETATION:  CLINICAL INFORMATION: Focal right upper extremity tremor    TECHNIQUE:  1.  Noncontrast head CT scan was performed reconstructed into 5 mm thick axial slices.  2.  Contrast enhanced CT angiography of the neck and head was performed.   MIP reformats were generated.    Intravenous contrast:90 cc of Omnipaque-350 mg/ml were administered, and 10 cc were discarded.    COMPARISON STUDY: None.    FINDINGS:  NONCONTRAST HEAD CT SCAN:  There is no CT evidence of acute intracranial hemorrhage, mass effect or midline shift.  Gray matter-white matter differentiation is grossly preserved.    The ventricles, sulci and extra-axial spaces appear within normal limits.    The paranasal sinuses and tympanomastoid cavities are grossly clear.    There is a 1.3 cm lobulated bony density seen posteriorto the to the left IAC in the left CP angle, nonspecific, may represent meningioma. Further evaluation with MRI can be obtained..    CT ANGIOGRAPHY NECK:  Thoracic aorta and branch vessels: Three vessel arch.  No occlusion, flow limiting stenosis or dissection.    Right carotid system: No occlusion, hemodynamically significant carotid stenosis using NASCET criteria or dissection.    Left carotid system: No occlusion, hemodynamically significant carotid stenosis using NASCET criteria or dissection.    Vertebral arteries: No occlusion, flow-limiting stenosis or dissection.  Right dominant vertebral artery; hypoplastic left vertebral artery ends in a left PICA distribution..    Soft tissues of the neck: Within normal limits.    Visualized spine:Degenerative disease..    Visualized upper chest:  Within normal limits    CT ANGIOGRAPHY BRAIN:  Internal carotid arteries: No occlusion, flow-limiting stenosis or aneurysm.    Anterior cerebral arteries: No occlusion, flow-limiting stenosis or aneurysm    Middle cerebral arteries: No occlusion, flow-limiting stenosis or aneurysm.    Anterior communicating artery: Patent without aneurysm.    Posterior communicating arteries: Patent bilaterally without aneurysm.    Posterior cerebral arteries: Noocclusion, flow-limiting stenosis or aneurysm.    Vertebrobasilar: No occlusion, flow-limiting stenosis or aneurysm.  The distal vertebral arteries are codominant.  Posterior inferior cerebellar arteries, anterior inferior cerebellar arteries and superior cerebellar arteries are patent bilaterally.    IMPRESSION:  NONCONTRAST HEAD CT SCAN:  1. No CT evidence of acute intracranial hemorrhage, mass effect or acute territorial infarct.  2. There is a 1.3 cm lobulated bony density seen posterior to the to the left IAC in the left CP angle, nonspecific, may represent meningioma. Further evaluation with MRI can be obtained..    CT ANGIOGRAPHY NECK: Patent cervical vasculature.  No hemodynamically significant carotid stenosis or flow-limiting vertebral artery stenosis.  No evidence of dissection.    CT ANGIOGRAPHY BRAIN: No vessel occlusion, flow-limiting stenosis or aneurysm is identified about the [place].              JIE DUMONT M.D., RADIOLOGY RESIDENT  This document has been electronically signed.  CHAPIN ALONSO M.D., ATTENDING RADIOLOGIST  This document has been electronically signed. Aug 26 2020  3:38AM             (20)    CT Head No Cont:   EXAM:  CT ANGIO BRAIN (W)AW IC                          EXAM:  CT BRAIN                          EXAM:  CT ANGIO NECK (W)AW IC                            PROCEDURE DATE:  2020            INTERPRETATION:  CLINICAL INFORMATION: Focal right upper extremity tremor    TECHNIQUE:  1.  Noncontrast head CT scan was performed reconstructed into 5 mm thick axial slices.  2.  Contrast enhanced CT angiography of the neck and head was performed.   MIP reformats were generated.    Intravenous contrast:90 cc of Omnipaque-350 mg/ml were administered, and 10 cc were discarded.    COMPARISON STUDY: None.    FINDINGS:  NONCONTRAST HEAD CT SCAN:  There is no CT evidence of acute intracranial hemorrhage, mass effect or midline shift.  Gray matter-white matter differentiation is grossly preserved.    The ventricles, sulci and extra-axial spaces appear within normal limits.    The paranasal sinuses and tympanomastoid cavities are grossly clear.    There is a 1.3 cm lobulated bony density seen posteriorto the to the left IAC in the left CP angle, nonspecific, may represent meningioma. Further evaluation with MRI can be obtained..    CT ANGIOGRAPHY NECK:  Thoracic aorta and branch vessels: Three vessel arch.  No occlusion, flow limiting stenosis or dissection.    Right carotid system: No occlusion, hemodynamically significant carotid stenosis using NASCET criteria or dissection.    Left carotid system: No occlusion, hemodynamically significant carotid stenosis using NASCET criteria or dissection.    Vertebral arteries: No occlusion, flow-limiting stenosis or dissection.  Right dominant vertebral artery; hypoplastic left vertebral artery ends in a left PICA distribution..    Soft tissues of the neck: Within normal limits.    Visualized spine:Degenerative disease..    Visualized upper chest:  Within normal limits    CT ANGIOGRAPHY BRAIN:  Internal carotid arteries: No occlusion, flow-limiting stenosis or aneurysm.    Anterior cerebral arteries: No occlusion, flow-limiting stenosis or aneurysm    Middle cerebral arteries: No occlusion, flow-limiting stenosis or aneurysm.    Anterior communicating artery: Patent without aneurysm.    Posterior communicating arteries: Patent bilaterally without aneurysm.    Posterior cerebral arteries: Noocclusion, flow-limiting stenosis or aneurysm.    Vertebrobasilar: No occlusion, flow-limiting stenosis or aneurysm.  The distal vertebral arteries are codominant.  Posterior inferior cerebellar arteries, anterior inferior cerebellar arteries and superior cerebellar arteries are patent bilaterally.    IMPRESSION:  NONCONTRAST HEAD CT SCAN:  1. No CT evidence of acute intracranial hemorrhage, mass effect or acute territorial infarct.  2. There is a 1.3 cm lobulated bony density seen posterior to the to the left IAC in the left CP angle, nonspecific, may represent meningioma. Further evaluation with MRI can be obtained..    CT ANGIOGRAPHY NECK: Patent cervical vasculature.  No hemodynamically significant carotid stenosis or flow-limiting vertebral artery stenosis.  No evidence of dissection.    CT ANGIOGRAPHY BRAIN: No vessel occlusion, flow-limiting stenosis or aneurysm is identified about the [place].              JIE DUMONT M.D., RADIOLOGY RESIDENT  This document has been electronically signed.  CHAPIN ALONSO M.D., ATTENDING RADIOLOGIST  This document has been electronically signed. Aug 26 2020  3:38AM             (20) NEUROLOGY CONSULT   HPI: 70y R-handed woman with a  h/o glaucoma, morbid obesity, chronic shoulder pain, gout, B/L LE weakness with knee issues, GCA and HTN who presented on 20 with 2 weeks of generalized weakness and 2 days of R. arm and leg tremor for which neurology was consulted.  PT notes at baseline she has had decreased mobility due to knee pain but in last two weeks had significant worsening of B/L LE weakness.  On 20 PT suddenly started noticing uncontrolled spasms and tremors of R. arm and leg.  Episodes are painful, worse with movement, a/w neck spasm to the right and can last between 30 seconds and a few minutes.  She is conscious during the entire episode, has no impaired awareness, amnesia, tongue bite or incontinence.  No h/o seizures, dystonia, PD or any other movement disorder in herself or family.  PT notes she recently increased lisinopril, but no other medication changes.  Denies any neck trauma or falls.      ROS: A 10-system ROS was performed and is negative except for those items noted above.     PMH:  Glaucoma  Morbid obesity  Shoulder pain  OA (osteoarthritis)  Gout  BERKLEY (obstructive sleep apnea)  H/O seasonal allergies  GERD (gastroesophageal reflux disease)  GCA (giant cell arteritis)  HTN (hypertension)    Home Medications:  allopurinol 100 mg oral tablet: 1 tab(s) orally 2 times a day (26 Aug 2020 04:57)  Alphagan: 1 drop(s) to each affected eye 2 times a day (26 Aug 2020 04:57)  cyclobenzaprine 10 mg oral tablet: 1 tab(s) orally 3 times a day (26 Aug 2020 04:57)  hydrOXYzine hydrochloride 10 mg oral tablet: 2 tab(s) orally once a day, As Needed (26 Aug 2020 04:57)  lisinopril 40 mg oral tablet: 1 tab(s) orally once a day (at bedtime) (26 Aug 2020 04:57)    MEDICATIONS  (STANDING):  cefTRIAXone   IVPB 2000 milliGRAM(s) IV Intermittent every 12 hours  chlorhexidine 0.12% Liquid 15 milliLiter(s) Oral Mucosa every 12 hours  chlorhexidine 4% Liquid 1 Application(s) Topical <User Schedule>  dextrose 5%. 1000 milliLiter(s) (50 mL/Hr) IV Continuous <Continuous>  dextrose 50% Injectable 12.5 Gram(s) IV Push once  dextrose 50% Injectable 25 Gram(s) IV Push once  dextrose 50% Injectable 25 Gram(s) IV Push once  enoxaparin Injectable 30 milliGRAM(s) SubCutaneous two times a day  insulin regular Infusion 3 Unit(s)/Hr (3 mL/Hr) IV Continuous <Continuous>  lactated ringers. 1000 milliLiter(s) (250 mL/Hr) IV Continuous <Continuous>  levETIRAcetam  IVPB 1500 milliGRAM(s) IV Intermittent once  midazolam Infusion 0.02 mG/kG/Hr (2.54 mL/Hr) IV Continuous <Continuous>  potassium chloride  20 mEq/100 mL IVPB 20 milliEquivalent(s) IV Intermittent once  sodium chloride 0.9% Bolus 1000 milliLiter(s) IV Bolus once    MEDICATIONS  (PRN):  dextrose 40% Gel 15 Gram(s) Oral once PRN Blood Glucose LESS THAN 70 milliGRAM(s)/deciLiter  glucagon  Injectable 1 milliGRAM(s) IntraMuscular once PRN Glucose <70 milliGRAM(s)/deciLiter    ALLERGIES: No Known Allergies    PSH:   S/P tubal ligation  OA (osteoarthritis)  S/P appendectomy  S/P arthroscopic knee surgery  S/P hysterectomy      Social History: Denies tobacco, alcohol or drug use.     FH: No FH of strokes, seizures or movement disorders.     OBJECTIVE  Vital Signs Last 24 Hrs  T(C): 37.7 (26 Aug 2020 00:53), Max: 37.7 (26 Aug 2020 00:53)  T(F): 99.8 (26 Aug 2020 00:53), Max: 99.8 (26 Aug 2020 00:53)  HR: 67 (26 Aug 2020 04:00) (67 - 110)  BP: 117/68 (26 Aug 2020 04:00) (102/71 - 236/116)  BP(mean): 89 (26 Aug 2020 04:00) (82 - 142)  RR: 14 (26 Aug 2020 04:00) (14 - 18)  SpO2: 99% (26 Aug 2020 04:00) (81% - 100%)  I&O's Summary    25 Aug 2020 07:01  -  26 Aug 2020 05:08  --------------------------------------------------------  IN: 0 mL / OUT: 1000 mL / NET: -1000 mL    PHYSICAL EXAM:  General: Morbidly obese, appears older than stated age, in NAD but in visible discomfort.   HEENT: Normocephalic atraumatic   Cardiac: S1, S2 normal. RRR with regular pulse.  No murmurs, rubs or gallops.  Respiratory: CTA throughout with good air entry.  MSK: Mild tenderness to palpation in posterior cervical musculature, tender to palpation of B/L knees.    Neurologic:  Mental Status: Awake, alert, oriented to person, place, situation, date.  Distressed affect. Follows all commands.  Language: Speech is clear, fluent with preserved naming, repetition and comprehension.    Cranial Nerves: PERRL (R = 3mm, L = 3mm). Visual fields intact. EOMI no nystagmus. V1-3 intact to light touch.  No facial asymmetry b/l, full eye closure strength b/l. Hearing grossly normal to conversation.  Symmetric palate elevation in midline.  Head turning & shoulder shrug intact b/l but on two occasions on head turn to left, had tonic contraction of R. neck side bending/SCM distribution. Tongue midline, normal movements, no atrophy.  Motor: Normal muscle bulk & tone at rest but intermittently has increased tone of RUE and RLE a/w tonic contraction and tremors.  Periodic episodes every 2-3 minutes of 1-3Hz low amplitude shaking of R. arm and R. leg lasting 30-60 sec a/w painful spasms and tonic contraction of R. neck.  Tremors worsen with stimulus and with intention/action but present at rest.  Episodes not associated with confusion, unresponsiveness, incontinence, tongue bite or amnesia.  4-/5 strength B/L UE flexion, extension, decreased  strength R. hand c/w left.  3/5 pain limited B/L hip flexion, knee extension.  4-/5 B/L plantarflexion & dorsiflexion.   Sensation: Symmetric B/L preserved sensation to light touch, pin prick.  Reduced sensation to temperature on right from mid abdomen down.  Impaired proprioception to level of toes L>R.  Impaired vibration to knees B/L.  Impaired two point discrimination throughout.    Cortical: No extinction on double simultaneous touch and no signs of neglect.   Reflexes: 1/4 throughout.  Plantar Responses are upgoing on right, downgoing on left.   Coordination:  Pain and spasm limited testing but no gross asymmetry in fine finger movements B/L.   Psychiatric: Distressed mood and congruent affect.     CBC:                     14.4   10.88 )-----------( 177      ( 25 Aug 2020 22:43 )             44.5   CMP:    139  |  94<L>  |  26<H>  ----------------------------<  591<HH>  7.1<HH>   |  20<L>  |  1.12    POCT Blood Glucose.: 550 mg/dL (26 Aug 2020 04:39)  POCT Blood Glucose.: 481 mg/dL (26 Aug 2020 03:28)  POCT Blood Glucose.: 515 mg/dL (26 Aug 2020 03:08)  POCT Blood Glucose.: 562 mg/dL (26 Aug 2020 01:29)  Glucose, Serum: 796 mg/dL (20 @ 22:43)    Ca    9.7      26 Aug 2020 02:56  Phos  3.3       Mg     2.4         TPro  7.7  /  Alb  3.7  /  TBili  0.5  /  DBili  x   /  AST  60<H>  /  ALT  56<H>  /  AlkPhos  118  25    UA:  Urinalysis Basic - ( 26 Aug 2020 00:16 )  Color: Colorless / Appearance: Clear / S.034 / pH: x  Gluc: x / Ketone: Small  / Bili: Negative / Urobili: Negative   Blood: x / Protein: 30 mg/dL / Nitrite: Negative   Leuk Esterase: Small / RBC: 2 /hpf / WBC 10 /HPF   Sq Epi: x / Non Sq Epi: 1 /hpf / Bacteria: Few    ABG - ( 26 Aug 2020 04:32 )  pH, Arterial: 7.43  pH, Blood: x     /  pCO2: 43    /  pO2: 129   / HCO3: 28    / Base Excess: 3.6   /  SaO2: 99        20 CT Head:   1. No CT evidence of acute intracranial hemorrhage, mass effect or acute territorial infarct.  2. There is a 1.3 cm lobulated bony density seen posterior to the to the left IAC in the left CP angle, nonspecific, may represent meningioma. Further evaluation with MRI can be obtained..  20 CTA NECK: Patent cervical vasculature.  No hemodynamically significant carotid stenosis or flow-limiting vertebral artery stenosis.  No evidence of dissection.  20 CTA BRAIN: No vessel occlusion, flow-limiting stenosis or aneurysm is identified about the [place].

## 2020-08-27 DIAGNOSIS — E11.65 TYPE 2 DIABETES MELLITUS WITH HYPERGLYCEMIA: ICD-10-CM

## 2020-08-27 DIAGNOSIS — E11.10 TYPE 2 DIABETES MELLITUS WITH KETOACIDOSIS WITHOUT COMA: ICD-10-CM

## 2020-08-27 DIAGNOSIS — I10 ESSENTIAL (PRIMARY) HYPERTENSION: ICD-10-CM

## 2020-08-27 LAB
ANION GAP SERPL CALC-SCNC: 11 MMOL/L — SIGNIFICANT CHANGE UP (ref 5–17)
ANION GAP SERPL CALC-SCNC: 12 MMOL/L — SIGNIFICANT CHANGE UP (ref 5–17)
ANION GAP SERPL CALC-SCNC: 13 MMOL/L — SIGNIFICANT CHANGE UP (ref 5–17)
ANION GAP SERPL CALC-SCNC: 14 MMOL/L — SIGNIFICANT CHANGE UP (ref 5–17)
ANION GAP SERPL CALC-SCNC: 15 MMOL/L — SIGNIFICANT CHANGE UP (ref 5–17)
APTT BLD: 20.1 SEC — LOW (ref 27.5–35.5)
BUN SERPL-MCNC: 11 MG/DL — SIGNIFICANT CHANGE UP (ref 7–23)
BUN SERPL-MCNC: 11 MG/DL — SIGNIFICANT CHANGE UP (ref 7–23)
BUN SERPL-MCNC: 12 MG/DL — SIGNIFICANT CHANGE UP (ref 7–23)
BUN SERPL-MCNC: 12 MG/DL — SIGNIFICANT CHANGE UP (ref 7–23)
BUN SERPL-MCNC: 13 MG/DL — SIGNIFICANT CHANGE UP (ref 7–23)
CALCIUM SERPL-MCNC: 7.2 MG/DL — LOW (ref 8.4–10.5)
CALCIUM SERPL-MCNC: 8 MG/DL — LOW (ref 8.4–10.5)
CALCIUM SERPL-MCNC: 8.2 MG/DL — LOW (ref 8.4–10.5)
CALCIUM SERPL-MCNC: 8.3 MG/DL — LOW (ref 8.4–10.5)
CALCIUM SERPL-MCNC: 8.4 MG/DL — SIGNIFICANT CHANGE UP (ref 8.4–10.5)
CALCIUM SERPL-MCNC: 8.6 MG/DL — SIGNIFICANT CHANGE UP (ref 8.4–10.5)
CALCIUM SERPL-MCNC: 8.7 MG/DL — SIGNIFICANT CHANGE UP (ref 8.4–10.5)
CHLORIDE SERPL-SCNC: 100 MMOL/L — SIGNIFICANT CHANGE UP (ref 96–108)
CHLORIDE SERPL-SCNC: 102 MMOL/L — SIGNIFICANT CHANGE UP (ref 96–108)
CHLORIDE SERPL-SCNC: 102 MMOL/L — SIGNIFICANT CHANGE UP (ref 96–108)
CHLORIDE SERPL-SCNC: 103 MMOL/L — SIGNIFICANT CHANGE UP (ref 96–108)
CHLORIDE SERPL-SCNC: 103 MMOL/L — SIGNIFICANT CHANGE UP (ref 96–108)
CHLORIDE SERPL-SCNC: 104 MMOL/L — SIGNIFICANT CHANGE UP (ref 96–108)
CHLORIDE SERPL-SCNC: 106 MMOL/L — SIGNIFICANT CHANGE UP (ref 96–108)
CO2 SERPL-SCNC: 25 MMOL/L — SIGNIFICANT CHANGE UP (ref 22–31)
CO2 SERPL-SCNC: 26 MMOL/L — SIGNIFICANT CHANGE UP (ref 22–31)
CO2 SERPL-SCNC: 27 MMOL/L — SIGNIFICANT CHANGE UP (ref 22–31)
CO2 SERPL-SCNC: 27 MMOL/L — SIGNIFICANT CHANGE UP (ref 22–31)
CO2 SERPL-SCNC: 28 MMOL/L — SIGNIFICANT CHANGE UP (ref 22–31)
CO2 SERPL-SCNC: 30 MMOL/L — SIGNIFICANT CHANGE UP (ref 22–31)
CO2 SERPL-SCNC: 32 MMOL/L — HIGH (ref 22–31)
CREAT SERPL-MCNC: 0.74 MG/DL — SIGNIFICANT CHANGE UP (ref 0.5–1.3)
CREAT SERPL-MCNC: 0.75 MG/DL — SIGNIFICANT CHANGE UP (ref 0.5–1.3)
CREAT SERPL-MCNC: 0.76 MG/DL — SIGNIFICANT CHANGE UP (ref 0.5–1.3)
CREAT SERPL-MCNC: 0.77 MG/DL — SIGNIFICANT CHANGE UP (ref 0.5–1.3)
CREAT SERPL-MCNC: 0.79 MG/DL — SIGNIFICANT CHANGE UP (ref 0.5–1.3)
CREAT SERPL-MCNC: 0.8 MG/DL — SIGNIFICANT CHANGE UP (ref 0.5–1.3)
CREAT SERPL-MCNC: 0.8 MG/DL — SIGNIFICANT CHANGE UP (ref 0.5–1.3)
GAS PNL BLDA: SIGNIFICANT CHANGE UP
GAS PNL BLDA: SIGNIFICANT CHANGE UP
GLUCOSE BLDC GLUCOMTR-MCNC: 158 MG/DL — HIGH (ref 70–99)
GLUCOSE BLDC GLUCOMTR-MCNC: 160 MG/DL — HIGH (ref 70–99)
GLUCOSE BLDC GLUCOMTR-MCNC: 191 MG/DL — HIGH (ref 70–99)
GLUCOSE BLDC GLUCOMTR-MCNC: 200 MG/DL — HIGH (ref 70–99)
GLUCOSE BLDC GLUCOMTR-MCNC: 202 MG/DL — HIGH (ref 70–99)
GLUCOSE BLDC GLUCOMTR-MCNC: 214 MG/DL — HIGH (ref 70–99)
GLUCOSE BLDC GLUCOMTR-MCNC: 216 MG/DL — HIGH (ref 70–99)
GLUCOSE BLDC GLUCOMTR-MCNC: 217 MG/DL — HIGH (ref 70–99)
GLUCOSE BLDC GLUCOMTR-MCNC: 240 MG/DL — HIGH (ref 70–99)
GLUCOSE BLDC GLUCOMTR-MCNC: 260 MG/DL — HIGH (ref 70–99)
GLUCOSE BLDC GLUCOMTR-MCNC: 283 MG/DL — HIGH (ref 70–99)
GLUCOSE BLDC GLUCOMTR-MCNC: 290 MG/DL — HIGH (ref 70–99)
GLUCOSE BLDC GLUCOMTR-MCNC: 312 MG/DL — HIGH (ref 70–99)
GLUCOSE BLDC GLUCOMTR-MCNC: 326 MG/DL — HIGH (ref 70–99)
GLUCOSE BLDC GLUCOMTR-MCNC: 327 MG/DL — HIGH (ref 70–99)
GLUCOSE BLDC GLUCOMTR-MCNC: 336 MG/DL — HIGH (ref 70–99)
GLUCOSE BLDC GLUCOMTR-MCNC: 338 MG/DL — HIGH (ref 70–99)
GLUCOSE BLDC GLUCOMTR-MCNC: 365 MG/DL — HIGH (ref 70–99)
GLUCOSE SERPL-MCNC: 186 MG/DL — HIGH (ref 70–99)
GLUCOSE SERPL-MCNC: 197 MG/DL — HIGH (ref 70–99)
GLUCOSE SERPL-MCNC: 235 MG/DL — HIGH (ref 70–99)
GLUCOSE SERPL-MCNC: 242 MG/DL — HIGH (ref 70–99)
GLUCOSE SERPL-MCNC: 256 MG/DL — HIGH (ref 70–99)
GLUCOSE SERPL-MCNC: 329 MG/DL — HIGH (ref 70–99)
GLUCOSE SERPL-MCNC: 361 MG/DL — HIGH (ref 70–99)
HCT VFR BLD CALC: 42.3 % — SIGNIFICANT CHANGE UP (ref 34.5–45)
HGB BLD-MCNC: 13.5 G/DL — SIGNIFICANT CHANGE UP (ref 11.5–15.5)
INR BLD: 0.97 RATIO — SIGNIFICANT CHANGE UP (ref 0.88–1.16)
MAGNESIUM SERPL-MCNC: 1.5 MG/DL — LOW (ref 1.6–2.6)
MAGNESIUM SERPL-MCNC: 1.6 MG/DL — SIGNIFICANT CHANGE UP (ref 1.6–2.6)
MAGNESIUM SERPL-MCNC: 1.8 MG/DL — SIGNIFICANT CHANGE UP (ref 1.6–2.6)
MAGNESIUM SERPL-MCNC: 2 MG/DL — SIGNIFICANT CHANGE UP (ref 1.6–2.6)
MAGNESIUM SERPL-MCNC: 2.1 MG/DL — SIGNIFICANT CHANGE UP (ref 1.6–2.6)
MCHC RBC-ENTMCNC: 29.5 PG — SIGNIFICANT CHANGE UP (ref 27–34)
MCHC RBC-ENTMCNC: 31.9 GM/DL — LOW (ref 32–36)
MCV RBC AUTO: 92.6 FL — SIGNIFICANT CHANGE UP (ref 80–100)
NRBC # BLD: 0 /100 WBCS — SIGNIFICANT CHANGE UP (ref 0–0)
PHOSPHATE SERPL-MCNC: 1.9 MG/DL — LOW (ref 2.5–4.5)
PHOSPHATE SERPL-MCNC: 14.3 MG/DL — HIGH (ref 2.5–4.5)
PHOSPHATE SERPL-MCNC: 2.1 MG/DL — LOW (ref 2.5–4.5)
PHOSPHATE SERPL-MCNC: 2.3 MG/DL — LOW (ref 2.5–4.5)
PHOSPHATE SERPL-MCNC: 2.8 MG/DL — SIGNIFICANT CHANGE UP (ref 2.5–4.5)
PHOSPHATE SERPL-MCNC: 3.9 MG/DL — SIGNIFICANT CHANGE UP (ref 2.5–4.5)
PHOSPHATE SERPL-MCNC: 4.8 MG/DL — HIGH (ref 2.5–4.5)
PLATELET # BLD AUTO: 156 K/UL — SIGNIFICANT CHANGE UP (ref 150–400)
POTASSIUM SERPL-MCNC: 3 MMOL/L — LOW (ref 3.5–5.3)
POTASSIUM SERPL-MCNC: 3.2 MMOL/L — LOW (ref 3.5–5.3)
POTASSIUM SERPL-MCNC: 3.5 MMOL/L — SIGNIFICANT CHANGE UP (ref 3.5–5.3)
POTASSIUM SERPL-MCNC: 3.6 MMOL/L — SIGNIFICANT CHANGE UP (ref 3.5–5.3)
POTASSIUM SERPL-MCNC: 4 MMOL/L — SIGNIFICANT CHANGE UP (ref 3.5–5.3)
POTASSIUM SERPL-MCNC: 4.7 MMOL/L — SIGNIFICANT CHANGE UP (ref 3.5–5.3)
POTASSIUM SERPL-MCNC: 8.1 MMOL/L — CRITICAL HIGH (ref 3.5–5.3)
POTASSIUM SERPL-SCNC: 3 MMOL/L — LOW (ref 3.5–5.3)
POTASSIUM SERPL-SCNC: 3.2 MMOL/L — LOW (ref 3.5–5.3)
POTASSIUM SERPL-SCNC: 3.5 MMOL/L — SIGNIFICANT CHANGE UP (ref 3.5–5.3)
POTASSIUM SERPL-SCNC: 3.6 MMOL/L — SIGNIFICANT CHANGE UP (ref 3.5–5.3)
POTASSIUM SERPL-SCNC: 4 MMOL/L — SIGNIFICANT CHANGE UP (ref 3.5–5.3)
POTASSIUM SERPL-SCNC: 4.7 MMOL/L — SIGNIFICANT CHANGE UP (ref 3.5–5.3)
POTASSIUM SERPL-SCNC: 8.1 MMOL/L — CRITICAL HIGH (ref 3.5–5.3)
PROTHROM AB SERPL-ACNC: 11.6 SEC — SIGNIFICANT CHANGE UP (ref 10.6–13.6)
RBC # BLD: 4.57 M/UL — SIGNIFICANT CHANGE UP (ref 3.8–5.2)
RBC # FLD: 13.8 % — SIGNIFICANT CHANGE UP (ref 10.3–14.5)
SODIUM SERPL-SCNC: 141 MMOL/L — SIGNIFICANT CHANGE UP (ref 135–145)
SODIUM SERPL-SCNC: 143 MMOL/L — SIGNIFICANT CHANGE UP (ref 135–145)
SODIUM SERPL-SCNC: 144 MMOL/L — SIGNIFICANT CHANGE UP (ref 135–145)
SODIUM SERPL-SCNC: 146 MMOL/L — HIGH (ref 135–145)
WBC # BLD: 13.35 K/UL — HIGH (ref 3.8–10.5)
WBC # FLD AUTO: 13.35 K/UL — HIGH (ref 3.8–10.5)

## 2020-08-27 PROCEDURE — 99223 1ST HOSP IP/OBS HIGH 75: CPT | Mod: GC

## 2020-08-27 PROCEDURE — 95720 EEG PHY/QHP EA INCR W/VEEG: CPT

## 2020-08-27 PROCEDURE — 99291 CRITICAL CARE FIRST HOUR: CPT | Mod: 25

## 2020-08-27 PROCEDURE — 71045 X-RAY EXAM CHEST 1 VIEW: CPT | Mod: 26

## 2020-08-27 PROCEDURE — 99233 SBSQ HOSP IP/OBS HIGH 50: CPT

## 2020-08-27 RX ORDER — INSULIN GLARGINE 100 [IU]/ML
28 INJECTION, SOLUTION SUBCUTANEOUS AT BEDTIME
Refills: 0 | Status: DISCONTINUED | OUTPATIENT
Start: 2020-08-27 | End: 2020-08-28

## 2020-08-27 RX ORDER — ASPIRIN/CALCIUM CARB/MAGNESIUM 324 MG
300 TABLET ORAL DAILY
Refills: 0 | Status: DISCONTINUED | OUTPATIENT
Start: 2020-08-27 | End: 2020-08-28

## 2020-08-27 RX ORDER — ACETAMINOPHEN 500 MG
1000 TABLET ORAL ONCE
Refills: 0 | Status: COMPLETED | OUTPATIENT
Start: 2020-08-27 | End: 2020-08-27

## 2020-08-27 RX ORDER — DEXMEDETOMIDINE HYDROCHLORIDE IN 0.9% SODIUM CHLORIDE 4 UG/ML
0.5 INJECTION INTRAVENOUS
Qty: 200 | Refills: 0 | Status: DISCONTINUED | OUTPATIENT
Start: 2020-08-27 | End: 2020-08-27

## 2020-08-27 RX ORDER — MAGNESIUM SULFATE 500 MG/ML
2 VIAL (ML) INJECTION ONCE
Refills: 0 | Status: COMPLETED | OUTPATIENT
Start: 2020-08-27 | End: 2020-08-27

## 2020-08-27 RX ORDER — SODIUM CHLORIDE 9 MG/ML
1000 INJECTION, SOLUTION INTRAVENOUS
Refills: 0 | Status: DISCONTINUED | OUTPATIENT
Start: 2020-08-27 | End: 2020-08-28

## 2020-08-27 RX ORDER — POTASSIUM PHOSPHATE, MONOBASIC POTASSIUM PHOSPHATE, DIBASIC 236; 224 MG/ML; MG/ML
30 INJECTION, SOLUTION INTRAVENOUS ONCE
Refills: 0 | Status: COMPLETED | OUTPATIENT
Start: 2020-08-27 | End: 2020-08-27

## 2020-08-27 RX ORDER — POTASSIUM CHLORIDE 20 MEQ
20 PACKET (EA) ORAL
Refills: 0 | Status: COMPLETED | OUTPATIENT
Start: 2020-08-27 | End: 2020-08-27

## 2020-08-27 RX ORDER — HUMAN INSULIN 100 [IU]/ML
5 INJECTION, SUSPENSION SUBCUTANEOUS EVERY 6 HOURS
Refills: 0 | Status: DISCONTINUED | OUTPATIENT
Start: 2020-08-27 | End: 2020-08-27

## 2020-08-27 RX ORDER — INSULIN LISPRO 100/ML
VIAL (ML) SUBCUTANEOUS EVERY 6 HOURS
Refills: 0 | Status: DISCONTINUED | OUTPATIENT
Start: 2020-08-27 | End: 2020-08-28

## 2020-08-27 RX ORDER — NICARDIPINE HYDROCHLORIDE 30 MG/1
2.5 CAPSULE, EXTENDED RELEASE ORAL
Qty: 40 | Refills: 0 | Status: DISCONTINUED | OUTPATIENT
Start: 2020-08-27 | End: 2020-08-29

## 2020-08-27 RX ORDER — NICARDIPINE HYDROCHLORIDE 30 MG/1
20 CAPSULE, EXTENDED RELEASE ORAL ONCE
Refills: 0 | Status: COMPLETED | OUTPATIENT
Start: 2020-08-27 | End: 2020-08-27

## 2020-08-27 RX ORDER — DEXMEDETOMIDINE HYDROCHLORIDE IN 0.9% SODIUM CHLORIDE 4 UG/ML
0.2 INJECTION INTRAVENOUS
Qty: 200 | Refills: 0 | Status: DISCONTINUED | OUTPATIENT
Start: 2020-08-27 | End: 2020-08-28

## 2020-08-27 RX ORDER — NICARDIPINE HYDROCHLORIDE 30 MG/1
20 CAPSULE, EXTENDED RELEASE ORAL THREE TIMES A DAY
Refills: 0 | Status: DISCONTINUED | OUTPATIENT
Start: 2020-08-27 | End: 2020-08-28

## 2020-08-27 RX ADMIN — NICARDIPINE HYDROCHLORIDE 25 MG/HR: 30 CAPSULE, EXTENDED RELEASE ORAL at 08:09

## 2020-08-27 RX ADMIN — ENOXAPARIN SODIUM 40 MILLIGRAM(S): 100 INJECTION SUBCUTANEOUS at 05:07

## 2020-08-27 RX ADMIN — LEVETIRACETAM 400 MILLIGRAM(S): 250 TABLET, FILM COATED ORAL at 05:07

## 2020-08-27 RX ADMIN — PANTOPRAZOLE SODIUM 40 MILLIGRAM(S): 20 TABLET, DELAYED RELEASE ORAL at 13:10

## 2020-08-27 RX ADMIN — Medication 0.5 MILLIGRAM(S): at 07:45

## 2020-08-27 RX ADMIN — Medication 50 MILLIEQUIVALENT(S): at 18:00

## 2020-08-27 RX ADMIN — ATORVASTATIN CALCIUM 80 MILLIGRAM(S): 80 TABLET, FILM COATED ORAL at 22:13

## 2020-08-27 RX ADMIN — Medication 1: at 17:40

## 2020-08-27 RX ADMIN — CHLORHEXIDINE GLUCONATE 1 APPLICATION(S): 213 SOLUTION TOPICAL at 05:07

## 2020-08-27 RX ADMIN — Medication 300 MILLIGRAM(S): at 17:30

## 2020-08-27 RX ADMIN — Medication 1000 MILLIGRAM(S): at 16:00

## 2020-08-27 RX ADMIN — DEXMEDETOMIDINE HYDROCHLORIDE IN 0.9% SODIUM CHLORIDE 6.35 MICROGRAM(S)/KG/HR: 4 INJECTION INTRAVENOUS at 17:29

## 2020-08-27 RX ADMIN — SODIUM CHLORIDE 100 MILLILITER(S): 9 INJECTION, SOLUTION INTRAVENOUS at 06:30

## 2020-08-27 RX ADMIN — Medication 50 MILLIEQUIVALENT(S): at 08:55

## 2020-08-27 RX ADMIN — CHLORHEXIDINE GLUCONATE 15 MILLILITER(S): 213 SOLUTION TOPICAL at 05:07

## 2020-08-27 RX ADMIN — NICARDIPINE HYDROCHLORIDE 12.5 MG/HR: 30 CAPSULE, EXTENDED RELEASE ORAL at 18:40

## 2020-08-27 RX ADMIN — SODIUM CHLORIDE 100 MILLILITER(S): 9 INJECTION, SOLUTION INTRAVENOUS at 15:54

## 2020-08-27 RX ADMIN — CEFTRIAXONE 100 MILLIGRAM(S): 500 INJECTION, POWDER, FOR SOLUTION INTRAMUSCULAR; INTRAVENOUS at 05:07

## 2020-08-27 RX ADMIN — POTASSIUM PHOSPHATE, MONOBASIC POTASSIUM PHOSPHATE, DIBASIC 83.33 MILLIMOLE(S): 236; 224 INJECTION, SOLUTION INTRAVENOUS at 12:40

## 2020-08-27 RX ADMIN — HUMAN INSULIN 5 UNIT(S): 100 INJECTION, SUSPENSION SUBCUTANEOUS at 15:56

## 2020-08-27 RX ADMIN — ENOXAPARIN SODIUM 40 MILLIGRAM(S): 100 INJECTION SUBCUTANEOUS at 17:30

## 2020-08-27 RX ADMIN — DEXMEDETOMIDINE HYDROCHLORIDE IN 0.9% SODIUM CHLORIDE 6.35 MICROGRAM(S)/KG/HR: 4 INJECTION INTRAVENOUS at 10:30

## 2020-08-27 RX ADMIN — POTASSIUM PHOSPHATE, MONOBASIC POTASSIUM PHOSPHATE, DIBASIC 83.33 MILLIMOLE(S): 236; 224 INJECTION, SOLUTION INTRAVENOUS at 18:40

## 2020-08-27 RX ADMIN — NICARDIPINE HYDROCHLORIDE 20 MILLIGRAM(S): 30 CAPSULE, EXTENDED RELEASE ORAL at 23:15

## 2020-08-27 RX ADMIN — Medication 400 MILLIGRAM(S): at 15:40

## 2020-08-27 RX ADMIN — Medication 50 MILLIEQUIVALENT(S): at 11:00

## 2020-08-27 RX ADMIN — INSULIN HUMAN 1 UNIT(S)/HR: 100 INJECTION, SOLUTION SUBCUTANEOUS at 08:30

## 2020-08-27 RX ADMIN — Medication 50 MILLIEQUIVALENT(S): at 13:24

## 2020-08-27 RX ADMIN — DEXMEDETOMIDINE HYDROCHLORIDE IN 0.9% SODIUM CHLORIDE 15.9 MICROGRAM(S)/KG/HR: 4 INJECTION INTRAVENOUS at 03:28

## 2020-08-27 RX ADMIN — LEVETIRACETAM 400 MILLIGRAM(S): 250 TABLET, FILM COATED ORAL at 17:29

## 2020-08-27 RX ADMIN — Medication 50 GRAM(S): at 18:41

## 2020-08-27 RX ADMIN — Medication 50 MILLIEQUIVALENT(S): at 16:08

## 2020-08-27 RX ADMIN — INSULIN GLARGINE 28 UNIT(S): 100 INJECTION, SOLUTION SUBCUTANEOUS at 22:15

## 2020-08-27 NOTE — CONSULT NOTE ADULT - ASSESSMENT
69 y/o F with history of HTN, Gout, obesity, presenting w/ b/l ankle swelling, fatigue/sleepiness and generalized weakness x several days. Found to have serum BG of 796, AG 22, bicarb 21, BHB2.3, lactate 9, WBC 10.88. UA negative for UTI. CXR showed clear lungs. CTH showing possible meningioma. Given 2L IVF bolus and  admitted to MICU where she was started on insulin drip and IVF. Dextrose added to IVF at ~4pm on 8/26 and stopped early this morning 8/72. CCB seizure while in MICU possibly due to rapid correction of BG, intubated for airway protection. Endocrinology consulted for concern for DKA/HHS in the setting of newly diagnosed DM. 69 y/o F with history of HTN, Gout, obesity, presenting w/ b/l ankle swelling, fatigue/sleepiness and generalized weakness x several days. Found to have serum BG of 796, AG 22, bicarb 21, BHB2.3, lactate 9, WBC 10.88. UA negative for UTI. CXR showed clear lungs. CTH showing possible meningioma. Given 2L IVF bolus and  admitted to MICU where she was started on insulin drip and IVF. Dextrose added to IVF at ~4pm on 8/26 and stopped early this morning 8/72. CCB seizure while in MICU possibly due to rapid correction of BG, intubated for airway protection. Endocrinology consulted for concern for DKA/HHS in the setting of newly diagnosed DM (high risk patient with DKA with seizure with high level decision-making).

## 2020-08-27 NOTE — PROGRESS NOTE ADULT - SUBJECTIVE AND OBJECTIVE BOX
SUBJECTIVE: patient seen and examined at bedside. extubated this morning.     MEDICATIONS (HOME):  Home Medications:  allopurinol 100 mg oral tablet: 1 tab(s) orally 2 times a day (26 Aug 2020 04:57)  Alphagan: 1 drop(s) to each affected eye 2 times a day (26 Aug 2020 04:57)  cyclobenzaprine 10 mg oral tablet: 1 tab(s) orally 3 times a day (26 Aug 2020 04:57)  hydrOXYzine hydrochloride 10 mg oral tablet: 2 tab(s) orally once a day, As Needed (26 Aug 2020 04:57)  lisinopril 40 mg oral tablet: 1 tab(s) orally once a day (at bedtime) (26 Aug 2020 04:57)    MEDICATIONS  (STANDING):  atorvastatin 80 milliGRAM(s) Oral at bedtime  cefTRIAXone   IVPB 1000 milliGRAM(s) IV Intermittent every 24 hours  chlorhexidine 4% Liquid 1 Application(s) Topical <User Schedule>  dexMEDEtomidine Infusion 0.2 MICROgram(s)/kG/Hr (6.35 mL/Hr) IV Continuous <Continuous>  dextrose 5%. 1000 milliLiter(s) (50 mL/Hr) IV Continuous <Continuous>  dextrose 50% Injectable 12.5 Gram(s) IV Push once  dextrose 50% Injectable 25 Gram(s) IV Push once  dextrose 50% Injectable 25 Gram(s) IV Push once  enoxaparin Injectable 40 milliGRAM(s) SubCutaneous every 12 hours  insulin regular Infusion 2 Unit(s)/Hr (2 mL/Hr) IV Continuous <Continuous>  lactated ringers. 1000 milliLiter(s) (100 mL/Hr) IV Continuous <Continuous>  levETIRAcetam  IVPB 1000 milliGRAM(s) IV Intermittent every 12 hours  niCARdipine Infusion 2.5 mG/Hr (12.5 mL/Hr) IV Continuous <Continuous>  pantoprazole  Injectable 40 milliGRAM(s) IV Push daily  potassium chloride  20 mEq/100 mL IVPB 20 milliEquivalent(s) IV Intermittent every 2 hours  potassium phosphate IVPB 30 milliMole(s) IV Intermittent once    MEDICATIONS  (PRN):  dextrose 40% Gel 15 Gram(s) Oral once PRN Blood Glucose LESS THAN 70 milliGRAM(s)/deciLiter  glucagon  Injectable 1 milliGRAM(s) IntraMuscular once PRN Glucose <70 milliGRAM(s)/deciLiter    ALLERGIES/INTOLERANCES:  Allergies  No Known Allergies    VITALS & EXAMINATION:  Vital Signs Last 24 Hrs  T(C): 36 (27 Aug 2020 08:00), Max: 37.8 (26 Aug 2020 16:00)  T(F): 96.8 (27 Aug 2020 08:00), Max: 100 (26 Aug 2020 16:00)  HR: 90 (27 Aug 2020 10:15) (56 - 112)  BP: 145/74 (27 Aug 2020 10:00) (103/64 - 198/106)  BP(mean): 102 (27 Aug 2020 10:00) (78 - 142)  RR: 26 (27 Aug 2020 10:15) (14 - 38)  SpO2: 96% (27 Aug 2020 10:15) (85% - 100%)    Neurological (>12):  MS: extubated, eyes open, can say her name in a whisper, does not answer other questions, appears to be repositioning herself away from the left and towards the right, but does not answer if she is uncomfortable or aware or such movements, does not follow midline commands    CNs: PERRL (R = 4mm, L = 4mm). no gross facial asymmetry, primary gaze neutral    Motor: tone normal, moves extremities spontaneously roughly symmetrically     LABORATORY:  CBC                       13.5   13.35 )-----------( 156      ( 27 Aug 2020 01:09 )             42.3     Chem -    146<H>  |  102  |  11  ----------------------------<  197<H>  3.0<L>   |  32<H>  |  0.77    Ca    8.6      27 Aug 2020 10:12  Phos  1.9     08-  Mg     1.8         TPro  6.7  /  Alb  3.7  /  TBili  0.4  /  DBili  x   /  AST  28  /  ALT  48<H>  /  AlkPhos  93  08-    LFTs LIVER FUNCTIONS - ( 26 Aug 2020 04:11 )  Alb: 3.7 g/dL / Pro: 6.7 g/dL / ALK PHOS: 93 U/L / ALT: 48 U/L / AST: 28 U/L / GGT: x           Coagulopathy PT/INR - ( 27 Aug 2020 01:09 )   PT: 11.6 sec;   INR: 0.97 ratio         PTT - ( 27 Aug 2020 01:09 )  PTT:20.1 sec  Lipid Panel   A1c   Cardiac enzymes     U/A Urinalysis Basic - ( 26 Aug 2020 00:16 )    Color: Colorless / Appearance: Clear / S.034 / pH: x  Gluc: x / Ketone: Small  / Bili: Negative / Urobili: Negative   Blood: x / Protein: 30 mg/dL / Nitrite: Negative   Leuk Esterase: Small / RBC: 2 /hpf / WBC 10 /HPF   Sq Epi: x / Non Sq Epi: 1 /hpf / Bacteria: Few    STUDIES & IMAGING:  Studies (EKG, EEG, EMG, etc):     Radiology (XR, CT, MR, U/S, TTE/FILI):    < from: CT Head No Cont (20 @ 01:18) >  IMPRESSION:  NONCONTRAST HEAD CT SCAN:  1. No CT evidence of acute intracranial hemorrhage, mass effect or acute territorial infarct.  2. There is a 1.3 cm lobulated bony density seen posterior to the to the left IAC in the left CP angle, nonspecific, may represent meningioma. Further evaluation with MRI can be obtained..    CT ANGIOGRAPHY NECK: Patent cervical vasculature.  No hemodynamically significant carotid stenosis or flow-limiting vertebral artery stenosis.  No evidence of dissection.    CT ANGIOGRAPHY BRAIN: No vessel occlusion, flow-limiting stenosis or aneurysm is identified about the [place].    < end of copied text >

## 2020-08-27 NOTE — AIRWAY REMOVAL NOTE  ADULT & PEDS - ARTIFICAL AIRWAY REMOVAL COMMENTS
Extubation order placed. Order checked by Name, MRN and . Patient extubated with no issues recorded.

## 2020-08-27 NOTE — PROGRESS NOTE ADULT - ATTENDING COMMENTS
I performed a history and physical examination of the patient and discussed the management of the patient with the resident. I reviewed the resident's note and agree with the documented findings and plan of care with the following additions/exceptions.    she was extubated.  on exam her eyes are open. she states her name to command. she doesn't follow any other commands. she appeared restless, repositioning her body by leaning on her right shoulder and elevating her left leg. during the mvmts the tone in her limbs was normal and there were no mvmts that appeared c/w seizure, but eeg will be reviewed from that time to confirm. she didn't make eye contact. limbs moving symmetrically    await mri to further clarify etiology of her AMS, r/o strokes

## 2020-08-27 NOTE — PROGRESS NOTE ADULT - ATTENDING COMMENTS
Patient with history of htn and gout hear with weakness/polyuria/polydipsia and RUE twitching found to have HHS, lactic acidosis requiring insulin gtt, then had seizures and acute hypoxic respiratory failure due to seizures requiring intubation. Waking up this AM, extubated to face mask.  Oxygenating well, ventilating well.  She is able to answer some yes and no questions and tell us her name.  Will monitor off precedex.  Switch insulin gtt to basal/bolus regimen as tolerated as AG closed and this was more HHS than DKA.  Wean cardene as tolerated.  c/w ceftriaxone for aspiration pna. c/w AED, f/u neuro re: EEG read and whether we can wean AED.

## 2020-08-27 NOTE — CONSULT NOTE ADULT - PROBLEM SELECTOR RECOMMENDATION 3
- goal BP <130/80  - management per primary team - goal BP <130/80  - management per primary team      Lizbeth Wells DO, Endocrine Fellow   Pager [phone]. from 9-5PM. After hours and on weekends please call [phone].

## 2020-08-27 NOTE — CONSULT NOTE ADULT - ATTENDING COMMENTS
I performed a history and physical examination of the patient and discussed the management of the patient with the resident. I reviewed the resident's note and agree with the documented findings and plan of care with the following additions/exceptions.    DOS 8/26/20    the mvmts Dr. Vasquez witnessed and described sound like dystonia- with painful muscle contraction of L head turn and R arm contraction.    the icu team notes that when she arrived in icu she had decline in responsiveness and witnessed generalized shaking.   glc was nearly 800 initially but at the time of this different event which sounds more like seizure the glc was in 400s.    suspect both spells were due to severe hyperglycemia plus the fluctuation    on exam this morning she was on propofol and versed had just been stopped a few min prior. perrl. no response to nasal tickle or noxious stimuli. no dolls. tone was symmetric in all 4. no w/d except right arm maybe had a little mvmt to noxious.    cont keppra for now   veeg is ongoing  mri when able- do with and w/o contrast with IAC protocol given the incidental L CPA angle mass seen on hct though that doesn't seem to be related. hct was otherwise normal apprearing.  will cont to follow
Agree with assessment and plan as above by Dr. Wells. Reviewed all pertinent labs, glucose values, and imaging studies. Modifications made as indicated above. NPH already given. Can d/c gtt 1 hour after NPH dose and start Lantus 28 units qhs based on insulin gtt rates. Remains NPO for now so recommend moderate correction q6 while NPO. Once eating can start pre-meal Humalog.     Andrea Aquino D.O  [phone]

## 2020-08-27 NOTE — PROGRESS NOTE ADULT - SUBJECTIVE AND OBJECTIVE BOX
CHIEF COMPLAINT:  Patient is a 70y old  Female who presents with a chief complaint of HHS, Seizure (26 Aug 2020 13:21)    HPI:  71 yo female with PMHx of HTN, BERKLEY, GERD, Giant cell arthritis and gout presenting to [facility] ED evening of  with c/o generalized weakness. Per documentation, pt reported weakness with dysuria and urinary frequency x1 week as well as R sided dystonia/hand tremors for about 1 week. Pt and pt's daughter denying prior diagnosis of diabetes however unsure of when she last saw a doctor.     In ED pt afebrile, HR 80s, /102, CMP significant for elevated glucose of 796, elevated AG 22, HCO3 21, BHB 2.3. Pt received 1L IVF Bolus, started on insulin gtt with rapid correction of glucose 796 --> 653 --> 562. Pt also noted with lactic acidosis 9.6 --> 5.6 with pH 7.34/47. Pt received Labetalol 10mg IV x2, 20mg x1 for HTN urgency in ED. To note, pt responsive in ED and cooperative to questioning during interview. CTH revealing possible meningioma.    Pt admitted to MICU for further management of Hyperglycemic Hyperosmolar State however upon arrival to MICU pt found unresponsive with generalized seizures vs. myoclonus with metabolic encephalopathy requiring urgent intubation for airway protection. Pt received 4mg Ativan x1 and started on propofol gtt and versed gtt for seizure control. Neurology consulted and pt was Keppra IV loaded and started on 1g q12 IVPB and placed on vEEG. Pt momentarily became hypotensive requiring initiation of vasopressors marsha-intubation which quickly were d/c'd. Pt received another 1L IVF bolus and maintained on insulin gtt and LR at 250cc/hr.    Interval Events:  Pt titrated off versed gtt upon placement of vEEG on  and resumed on propofol gtt and Keppra IVPB 1g q12. As per Neurology, suspecting witnessed generalized seizure were d/t severe hyperglycemia plus fluctuation. To date, vEEG negative for seizure like activity. Pt started on Nicardipine gtt for SBP control for goal 140-160. Pt maintained on insulin gtt and started on D5WLR when FS <300 with aggressive potassium repletion. Overnight,     REVIEW OF SYSTEMS:  unable to assess as pt intubated and sedated on mechanical ventilator.    OBJECTIVE:  ICU Vital Signs Last 24 Hrs  T(C): 37.2 (27 Aug 2020 04:00), Max: 37.8 (26 Aug 2020 16:00)  T(F): 99 (27 Aug 2020 04:00), Max: 100 (26 Aug 2020 16:00)  HR: 57 (27 Aug 2020 05:00) (57 - 112)  BP: 116/68 (27 Aug 2020 05:00) (116/68 - 135/59)  BP(mean): 87 (27 Aug 2020 05:00) (86 - 88)  ABP: 90/71 (27 Aug 2020 03:00) (90/71 - 192/106)  ABP(mean): 80 (27 Aug 2020 03:00) (80 - 146)  RR: 14 (27 Aug 2020 05:) (14 - 15)  SpO2: 98% (27 Aug 2020 05:) (97% - 100%)    Mode: AC/ CMV (Assist Control/ Continuous Mandatory Ventilation), RR (machine): 14, TV (machine): 500, FiO2: 40, PEEP: 5, ITime: 1, MAP: 11, PIP: 34    08-25 @ 07: @ 07:00  --------------------------------------------------------  IN: 1944.8 mL / OUT: 1475 mL / NET: 469.8 mL     @ 07: @ 05:20  --------------------------------------------------------  IN: 5678.9 mL / OUT: 4135 mL / NET: 1543.9 mL      CAPILLARY BLOOD GLUCOSE      POCT Blood Glucose.: 336 mg/dL (27 Aug 2020 04:56)    PHYSICAL EXAM:  Neuro:  sedated while on mechanical ventilator. Pupils 2 mm reactive equal. withdraws from painful stimuli in b/l upper and lower extremities.     Pulm:  orally intubated on mechanical ventilator, RR 14//FiO2 40/PEEP 5. diminished in lower lung fields bases to 1/4 up. Clear throughout, SPO2 98%.      CV:  cardiac monitor sinus without ectopy, + s1/s2, peripheral pulses palpable with radial 2+ bilat, dp/pt 1+/1+ bilat, digits warm to touch with good cap refill < 3 secs      GI/:  abd soft, obese, non distended. non tender, + hypoactive bowel sounds. contreras patent to bsd bladder non distended non palpable    Skin:  warm dry intact. without palpable nodes. without JVD appreciated    Lines: L Radial Artery A-line (), L IJ TLC ()      HOSPITAL MEDICATIONS:  MEDICATIONS  (STANDING):  atorvastatin 80 milliGRAM(s) Oral at bedtime  cefTRIAXone   IVPB 1000 milliGRAM(s) IV Intermittent every 24 hours  chlorhexidine 0.12% Liquid 15 milliLiter(s) Oral Mucosa every 12 hours  chlorhexidine 4% Liquid 1 Application(s) Topical <User Schedule>  dexMEDEtomidine Infusion 0.5 MICROgram(s)/kG/Hr (15.9 mL/Hr) IV Continuous <Continuous>  dextrose 5% + lactated ringers with potassium chloride 20 mEq/L 1000 milliLiter(s) (100 mL/Hr) IV Continuous <Continuous>  dextrose 5%. 1000 milliLiter(s) (50 mL/Hr) IV Continuous <Continuous>  dextrose 50% Injectable 12.5 Gram(s) IV Push once  dextrose 50% Injectable 25 Gram(s) IV Push once  dextrose 50% Injectable 25 Gram(s) IV Push once  enoxaparin Injectable 40 milliGRAM(s) SubCutaneous every 12 hours  insulin regular Infusion 4.5 Unit(s)/Hr (4.5 mL/Hr) IV Continuous <Continuous>  levETIRAcetam  IVPB 1000 milliGRAM(s) IV Intermittent every 12 hours  niCARdipine Infusion 5 mG/Hr (25 mL/Hr) IV Continuous <Continuous>  pantoprazole  Injectable 40 milliGRAM(s) IV Push daily  propofol Infusion 50 MICROgram(s)/kG/Min (38.1 mL/Hr) IV Continuous <Continuous>    MEDICATIONS  (PRN):  dextrose 40% Gel 15 Gram(s) Oral once PRN Blood Glucose LESS THAN 70 milliGRAM(s)/deciLiter  glucagon  Injectable 1 milliGRAM(s) IntraMuscular once PRN Glucose <70 milliGRAM(s)/deciLiter  sodium chloride 0.9% lock flush 10 milliLiter(s) IV Push every 1 hour PRN Pre/post blood products, medications, blood draw, and to maintain line patency      LABS:                        13.5   13.35 )-----------( 156      ( 27 Aug 2020 01:09 )             42.3     Hgb Trend: 13.5<--, 12.9<--, 14.4<--      141  |  100  |  13  ----------------------------<  361<H>  3.6   |  26  |  0.79    Ca    8.7      27 Aug 2020 01:09  Phos  2.1       Mg     1.8         TPro  6.7  /  Alb  3.7  /  TBili  0.4  /  DBili  x   /  AST  28  /  ALT  48<H>  /  AlkPhos  93      LIVER FUNCTIONS - ( 26 Aug 2020 04:11 )  Alb: 3.7 g/dL / Pro: 6.7 g/dL / ALK PHOS: 93 U/L / ALT: 48 U/L / AST: 28 U/L / GGT: x           Creatinine Trend: 0.79<--, 0.72<--, 0.92<--, 0.95<--, 1.07<--, 1.27<--  PT/INR - ( 27 Aug 2020 01:09 )   PT: 11.6 sec;   INR: 0.97 ratio         PTT - ( 27 Aug 2020 01:09 )  PTT:20.1 sec  Urinalysis Basic - ( 26 Aug 2020 00:16 )    Color: Colorless / Appearance: Clear / S.034 / pH: x  Gluc: x / Ketone: Small  / Bili: Negative / Urobili: Negative   Blood: x / Protein: 30 mg/dL / Nitrite: Negative   Leuk Esterase: Small / RBC: 2 /hpf / WBC 10 /HPF   Sq Epi: x / Non Sq Epi: 1 /hpf / Bacteria: Few      Arterial Blood Gas:   @ 00:59  7.49/41/92/31/98/7.7  ABG lactate: --  Arterial Blood Gas:   @ 09:58  7.46/39/90/27/97/3.8  ABG lactate: --  Arterial Blood Gas:   @ 04:32  7.43/43/129/28/99/3.6  ABG lactate: --    Venous Blood Gas:   @ 04:32  7.40/54/46/33/77  VBG Lactate: 5.4  Venous Blood Gas:   @ 02:56  7.34/47/53/25/82  VBG Lactate: 9.0  Venous Blood Gas:   @ 01:27  7.44/44/42/29/76  VBG Lactate: 3.1    MICROBIOLOGY:     RADIOLOGY:  < from: Xray Chest 1 View AP/PA (20 @ 06:28) >  EXAM:  XR CHEST AP OR PA 1V                            PROCEDURE DATE:  2020      INTERPRETATION:  XR CHEST. One view.  INDICATION: intubation and OGT placement  COMPARISON: Same day at 12:05 AM    FINDINGS/  IMPRESSION:  Enteric tube within the stomach. Endotracheal tube 1 cm above the hung. Left IJ central line within SVC..  Clear lungs. No pleural effusion or pneumothorax.  < end of copied text >    EXAM:  CT ANGIO BRAIN (W)AW IC                        EXAM:  CT BRAIN                        EXAM:  CT ANGIO NECK (W)AW IC                      < from: CT Head No Cont (20 @ 01:18) >    IMPRESSION:  NONCONTRAST HEAD CT SCAN:  1. No CT evidence of acute intracranial hemorrhage, mass effect or acute territorial infarct.  2. There is a 1.3 cm lobulated bony density seen posterior to the to the left IAC in the left CP angle, nonspecific, may represent meningioma. Further evaluation with MRI can be obtained..    CT ANGIOGRAPHY NECK: Patent cervical vasculature.  No hemodynamically significant carotid stenosis or flow-limiting vertebral artery stenosis.  No evidence of dissection.    CT ANGIOGRAPHY BRAIN: No vessel occlusion, flow-limiting stenosis or aneurysm is identified about the [place].    < end of copied text >    EKG:    Cindy ABRAMS (ext [phone]) CHIEF COMPLAINT:  Patient is a 70y old  Female who presents with a chief complaint of HHS, Seizure (26 Aug 2020 13:21)    HPI:  71 yo female with PMHx of HTN, BERKLEY, GERD, Giant cell arthritis and gout presenting to [facility] ED evening of  with c/o generalized weakness. Per documentation, pt reported weakness with dysuria and urinary frequency x1 week as well as R sided dystonia/hand tremors for about 1 week. Pt and pt's daughter denying prior diagnosis of diabetes however unsure of when she last saw a doctor.     In ED pt afebrile, HR 80s, /102, CMP significant for elevated glucose of 796, elevated AG 22, HCO3 21, BHB 2.3. Pt received 1L IVF Bolus, started on insulin gtt with rapid correction of glucose 796 --> 653 --> 562. Pt also noted with lactic acidosis 9.6 --> 5.6 with pH 7.34/47. Pt received Labetalol 10mg IV x2, 20mg x1 for HTN urgency in ED. To note, pt responsive in ED and cooperative to questioning during interview. CTH revealing possible meningioma.    Pt admitted to MICU for further management of Hyperglycemic Hyperosmolar State however upon arrival to MICU pt found unresponsive with generalized seizures vs. myoclonus with metabolic encephalopathy requiring urgent intubation for airway protection. Pt received 4mg Ativan x1 and started on propofol gtt and versed gtt for seizure control. Neurology consulted and pt was Keppra IV loaded and started on 1g q12 IVPB and placed on vEEG. Pt momentarily became hypotensive requiring initiation of vasopressors marsha-intubation which quickly were d/c'd. Pt received another 1L IVF bolus and maintained on insulin gtt and LR at 250cc/hr.    Interval Events:  Pt titrated off versed gtt upon placement of vEEG on  and resumed on propofol gtt and Keppra IVPB 1g q12. As per Neurology, suspecting witnessed generalized seizure were d/t severe hyperglycemia plus fluctuation. To date, vEEG negative for seizure like activity. Pt started on Nicardipine gtt for SBP control for goal 140-160. Pt maintained on insulin gtt and started on D5WLR IVF when FS <300 with aggressive potassium repletion.    Overnight, pt weaned off Nicardipine gtt as well as propofol gtt and seen awake, alert, moving all extremities and localizing (reaching for tube, etc.) however not focusing or following commands. Precedex gtt started for agitation however caused hypotension and oversedation at which time was discontinued. Pt remains on insulin gtt and D5WLR with AG remaining closed.     REVIEW OF SYSTEMS:  unable to assess as pt intubated and sedated on mechanical ventilator.    OBJECTIVE:  ICU Vital Signs Last 24 Hrs  T(C): 37.2 (27 Aug 2020 04:00), Max: 37.8 (26 Aug 2020 16:00)  T(F): 99 (27 Aug 2020 04:00), Max: 100 (26 Aug 2020 16:00)  HR: 57 (27 Aug 2020 05:00) (57 - 112)  BP: 116/68 (27 Aug 2020 05:00) (116/68 - 135/59)  BP(mean): 87 (27 Aug 2020 05:00) (86 - 88)  ABP: 90/71 (27 Aug 2020 03:00) (90/71 - 192/106)  ABP(mean): 80 (27 Aug 2020 03:00) (80 - 146)  RR: 14 (27 Aug 2020 05:00) (14 - 15)  SpO2: 98% (27 Aug 2020 05:) (97% - 100%)    Mode: AC/ CMV (Assist Control/ Continuous Mandatory Ventilation), RR (machine): 14, TV (machine): 500, FiO2: 40, PEEP: 5, ITime: 1, MAP: 11, PIP: 34     @ 07:  -   @ 07:00  --------------------------------------------------------  IN: 1944.8 mL / OUT: 1475 mL / NET: 469.8 mL     @ 07:  -   @ 05:20  --------------------------------------------------------  IN: 5678.9 mL / OUT: 4135 mL / NET: 1543.9 mL      CAPILLARY BLOOD GLUCOSE      POCT Blood Glucose.: 336 mg/dL (27 Aug 2020 04:56)    PHYSICAL EXAM:  Neuro:  sedated while on mechanical ventilator. Pupils 2 mm reactive equal. withdraws from painful stimuli in b/l upper and lower extremities.     Pulm:  orally intubated on mechanical ventilator, RR 14//FiO2 40/PEEP 5. diminished in lower lung fields bases to 1/4 up. Clear throughout, SPO2 98%.      CV:  cardiac monitor sinus without ectopy, + s1/s2, peripheral pulses palpable with radial 2+ bilat, dp/pt 1+/1+ bilat, digits warm to touch with good cap refill < 3 secs      GI/:  abd soft, obese, non distended. non tender, + hypoactive bowel sounds. contreras patent to bsd bladder non distended non palpable    Skin:  warm dry intact. without palpable nodes. without JVD appreciated    Lines: L Radial Artery A-line (), L IJ TLC ()      HOSPITAL MEDICATIONS:  MEDICATIONS  (STANDING):  atorvastatin 80 milliGRAM(s) Oral at bedtime  cefTRIAXone   IVPB 1000 milliGRAM(s) IV Intermittent every 24 hours  chlorhexidine 0.12% Liquid 15 milliLiter(s) Oral Mucosa every 12 hours  chlorhexidine 4% Liquid 1 Application(s) Topical <User Schedule>  dexMEDEtomidine Infusion 0.5 MICROgram(s)/kG/Hr (15.9 mL/Hr) IV Continuous <Continuous>  dextrose 5% + lactated ringers with potassium chloride 20 mEq/L 1000 milliLiter(s) (100 mL/Hr) IV Continuous <Continuous>  dextrose 5%. 1000 milliLiter(s) (50 mL/Hr) IV Continuous <Continuous>  dextrose 50% Injectable 12.5 Gram(s) IV Push once  dextrose 50% Injectable 25 Gram(s) IV Push once  dextrose 50% Injectable 25 Gram(s) IV Push once  enoxaparin Injectable 40 milliGRAM(s) SubCutaneous every 12 hours  insulin regular Infusion 4.5 Unit(s)/Hr (4.5 mL/Hr) IV Continuous <Continuous>  levETIRAcetam  IVPB 1000 milliGRAM(s) IV Intermittent every 12 hours  niCARdipine Infusion 5 mG/Hr (25 mL/Hr) IV Continuous <Continuous>  pantoprazole  Injectable 40 milliGRAM(s) IV Push daily  propofol Infusion 50 MICROgram(s)/kG/Min (38.1 mL/Hr) IV Continuous <Continuous>    MEDICATIONS  (PRN):  dextrose 40% Gel 15 Gram(s) Oral once PRN Blood Glucose LESS THAN 70 milliGRAM(s)/deciLiter  glucagon  Injectable 1 milliGRAM(s) IntraMuscular once PRN Glucose <70 milliGRAM(s)/deciLiter  sodium chloride 0.9% lock flush 10 milliLiter(s) IV Push every 1 hour PRN Pre/post blood products, medications, blood draw, and to maintain line patency      LABS:                        13.5   13.35 )-----------( 156      ( 27 Aug 2020 01:09 )             42.3     Hgb Trend: 13.5<--, 12.9<--, 14.4<--      141  |  100  |  13  ----------------------------<  361<H>  3.6   |  26  |  0.79    Ca    8.7      27 Aug 2020 01:09  Phos  2.1       Mg     1.8         TPro  6.7  /  Alb  3.7  /  TBili  0.4  /  DBili  x   /  AST  28  /  ALT  48<H>  /  AlkPhos  93  26    LIVER FUNCTIONS - ( 26 Aug 2020 04:11 )  Alb: 3.7 g/dL / Pro: 6.7 g/dL / ALK PHOS: 93 U/L / ALT: 48 U/L / AST: 28 U/L / GGT: x           Creatinine Trend: 0.79<--, 0.72<--, 0.92<--, 0.95<--, 1.07<--, 1.27<--  PT/INR - ( 27 Aug 2020 01:09 )   PT: 11.6 sec;   INR: 0.97 ratio         PTT - ( 27 Aug 2020 01:09 )  PTT:20.1 sec  Urinalysis Basic - ( 26 Aug 2020 00:16 )    Color: Colorless / Appearance: Clear / S.034 / pH: x  Gluc: x / Ketone: Small  / Bili: Negative / Urobili: Negative   Blood: x / Protein: 30 mg/dL / Nitrite: Negative   Leuk Esterase: Small / RBC: 2 /hpf / WBC 10 /HPF   Sq Epi: x / Non Sq Epi: 1 /hpf / Bacteria: Few      Arterial Blood Gas:   @ 00:59  7.49/41/92/31/98/7.7  ABG lactate: --  Arterial Blood Gas:   @ 09:58  7.46/39/90/27/97/3.8  ABG lactate: --  Arterial Blood Gas:   @ 04:32  7.43/43/129/28/99/3.6  ABG lactate: --    Venous Blood Gas:   @ 04:32  7.40/54/46/33/77  VBG Lactate: 5.4  Venous Blood Gas:   @ 02:56  7.34/47/53/25/82  VBG Lactate: 9.0  Venous Blood Gas:   @ 01:27  7.44/44/42/29/76  VBG Lactate: 3.1    MICROBIOLOGY:     RADIOLOGY:  < from: Xray Chest 1 View AP/PA (20 @ 06:28) >  EXAM:  XR CHEST AP OR PA 1V                            PROCEDURE DATE:  2020      INTERPRETATION:  XR CHEST. One view.  INDICATION: intubation and OGT placement  COMPARISON: Same day at 12:05 AM    FINDINGS/  IMPRESSION:  Enteric tube within the stomach. Endotracheal tube 1 cm above the hung. Left IJ central line within SVC..  Clear lungs. No pleural effusion or pneumothorax.  < end of copied text >    EXAM:  CT ANGIO BRAIN (W)AW IC                        EXAM:  CT BRAIN                        EXAM:  CT ANGIO NECK (W)AW IC                      < from: CT Head No Cont (20 @ 01:18) >    IMPRESSION:  NONCONTRAST HEAD CT SCAN:  1. No CT evidence of acute intracranial hemorrhage, mass effect or acute territorial infarct.  2. There is a 1.3 cm lobulated bony density seen posterior to the to the left IAC in the left CP angle, nonspecific, may represent meningioma. Further evaluation with MRI can be obtained..    CT ANGIOGRAPHY NECK: Patent cervical vasculature.  No hemodynamically significant carotid stenosis or flow-limiting vertebral artery stenosis.  No evidence of dissection.    CT ANGIOGRAPHY BRAIN: No vessel occlusion, flow-limiting stenosis or aneurysm is identified about the [place].    < end of copied text >    EKG:    Cindy ABRAMS (ext [id number]) CHIEF COMPLAINT:  Patient is a 70y old  Female who presents with a chief complaint of HHS, Seizure (26 Aug 2020 13:21)    HPI:  69 yo female with PMHx of HTN, BERKLEY, GERD, Giant cell arthritis and gout presenting to [facility] ED evening of  with c/o generalized weakness. Per documentation, pt reported weakness with dysuria and urinary frequency x1 week as well as R sided dystonia/hand tremors for about 1 week. Pt and pt's daughter denying prior diagnosis of diabetes however unsure of when she last saw a doctor.     In ED pt afebrile, HR 80s, /102, CMP significant for elevated glucose of 796, elevated AG 22, HCO3 21, BHB 2.3. Pt received 1L IVF Bolus, started on insulin gtt with rapid correction of glucose 796 --> 653 --> 562. Pt also noted with lactic acidosis 9.6 --> 5.6 with pH 7.34/47. Pt received Labetalol 10mg IV x2, 20mg x1 for HTN urgency in ED. To note, pt responsive in ED and cooperative to questioning during interview. CTH revealing possible meningioma.    Pt admitted to MICU for further management of Hyperglycemic Hyperosmolar State however upon arrival to MICU pt found unresponsive with generalized seizures vs. myoclonus with metabolic encephalopathy requiring urgent intubation for airway protection. Pt received 4mg Ativan x1 and started on propofol gtt and versed gtt for seizure control. Neurology consulted and pt was Keppra IV loaded and started on 1g q12 IVPB and placed on vEEG. Pt momentarily became hypotensive requiring initiation of vasopressors marsha-intubation which quickly were d/c'd. Pt received another 1L IVF bolus and maintained on insulin gtt and LR at 250cc/hr.    Interval Events:  Pt titrated off versed gtt upon placement of vEEG on  and resumed on propofol gtt and Keppra IVPB 1g q12. As per Neurology, suspecting witnessed generalized seizure were d/t severe hyperglycemia plus fluctuation. To date, vEEG negative for seizure like activity. Pt started on Nicardipine gtt for SBP control for goal 140-160. Pt maintained on insulin gtt and started on D5WLR IVF when FS <300 with aggressive potassium repletion.    Overnight, pt weaned off Nicardipine gtt as well as propofol gtt and seen awake, alert, moving all extremities and localizing (reaching for tube, etc.) however not focusing or following commands. Precedex gtt started for agitation however caused hypotension and was discontinued. Pt remains on insulin gtt and D5WLR with AG remaining closed.     REVIEW OF SYSTEMS:  unable to assess as pt intubated and sedated on mechanical ventilator.    OBJECTIVE:  ICU Vital Signs Last 24 Hrs  T(C): 37.2 (27 Aug 2020 04:00), Max: 37.8 (26 Aug 2020 16:00)  T(F): 99 (27 Aug 2020 04:00), Max: 100 (26 Aug 2020 16:00)  HR: 57 (27 Aug 2020 05:) (57 - 112)  BP: 116/68 (27 Aug 2020 05:00) (116/68 - 135/59)  BP(mean): 87 (27 Aug 2020 05:) (86 - 88)  ABP: 90/71 (27 Aug 2020 03:00) (90/71 - 192/106)  ABP(mean): 80 (27 Aug 2020 03:) (80 - 146)  RR: 14 (27 Aug 2020 05:) (14 - 15)  SpO2: 98% (27 Aug 2020 05:) (97% - 100%)    Mode: AC/ CMV (Assist Control/ Continuous Mandatory Ventilation), RR (machine): 14, TV (machine): 500, FiO2: 40, PEEP: 5, ITime: 1, MAP: 11, PIP: 34     @ :  -   @ 07:00  --------------------------------------------------------  IN: 1944.8 mL / OUT: 1475 mL / NET: 469.8 mL     @ 07: @ 05:20  --------------------------------------------------------  IN: 5678.9 mL / OUT: 4135 mL / NET: 1543.9 mL      CAPILLARY BLOOD GLUCOSE      POCT Blood Glucose.: 336 mg/dL (27 Aug 2020 04:56)    PHYSICAL EXAM:  Neuro:  sedated while on mechanical ventilator. Pupils 2 mm reactive equal. withdraws from painful stimuli in b/l upper and lower extremities.     Pulm:  orally intubated on mechanical ventilator, RR 14//FiO2 40/PEEP 5. diminished in lower lung fields bases to 1/4 up. Clear throughout, SPO2 98%.      CV:  cardiac monitor sinus without ectopy, + s1/s2, peripheral pulses palpable with radial 2+ bilat, dp/pt 1+/1+ bilat, digits warm to touch with good cap refill < 3 secs      GI/:  abd soft, obese, non distended. non tender, + hypoactive bowel sounds. contreras patent to bsd bladder non distended non palpable    Skin:  warm dry intact. without palpable nodes. without JVD appreciated    Lines: L Radial Artery A-line (), L IJ TLC ()      HOSPITAL MEDICATIONS:  MEDICATIONS  (STANDING):  atorvastatin 80 milliGRAM(s) Oral at bedtime  cefTRIAXone   IVPB 1000 milliGRAM(s) IV Intermittent every 24 hours  chlorhexidine 0.12% Liquid 15 milliLiter(s) Oral Mucosa every 12 hours  chlorhexidine 4% Liquid 1 Application(s) Topical <User Schedule>  dexMEDEtomidine Infusion 0.5 MICROgram(s)/kG/Hr (15.9 mL/Hr) IV Continuous <Continuous>  dextrose 5% + lactated ringers with potassium chloride 20 mEq/L 1000 milliLiter(s) (100 mL/Hr) IV Continuous <Continuous>  dextrose 5%. 1000 milliLiter(s) (50 mL/Hr) IV Continuous <Continuous>  dextrose 50% Injectable 12.5 Gram(s) IV Push once  dextrose 50% Injectable 25 Gram(s) IV Push once  dextrose 50% Injectable 25 Gram(s) IV Push once  enoxaparin Injectable 40 milliGRAM(s) SubCutaneous every 12 hours  insulin regular Infusion 4.5 Unit(s)/Hr (4.5 mL/Hr) IV Continuous <Continuous>  levETIRAcetam  IVPB 1000 milliGRAM(s) IV Intermittent every 12 hours  niCARdipine Infusion 5 mG/Hr (25 mL/Hr) IV Continuous <Continuous>  pantoprazole  Injectable 40 milliGRAM(s) IV Push daily  propofol Infusion 50 MICROgram(s)/kG/Min (38.1 mL/Hr) IV Continuous <Continuous>    MEDICATIONS  (PRN):  dextrose 40% Gel 15 Gram(s) Oral once PRN Blood Glucose LESS THAN 70 milliGRAM(s)/deciLiter  glucagon  Injectable 1 milliGRAM(s) IntraMuscular once PRN Glucose <70 milliGRAM(s)/deciLiter  sodium chloride 0.9% lock flush 10 milliLiter(s) IV Push every 1 hour PRN Pre/post blood products, medications, blood draw, and to maintain line patency      LABS:                        13.5   13.35 )-----------( 156      ( 27 Aug 2020 01:09 )             42.3     Hgb Trend: 13.5<--, 12.9<--, 14.4<--  0827    141  |  100  |  13  ----------------------------<  361<H>  3.6   |  26  |  0.79    Ca    8.7      27 Aug 2020 01:09  Phos  2.1       Mg     1.8         TPro  6.7  /  Alb  3.7  /  TBili  0.4  /  DBili  x   /  AST  28  /  ALT  48<H>  /  AlkPhos  93  26    LIVER FUNCTIONS - ( 26 Aug 2020 04:11 )  Alb: 3.7 g/dL / Pro: 6.7 g/dL / ALK PHOS: 93 U/L / ALT: 48 U/L / AST: 28 U/L / GGT: x           Creatinine Trend: 0.79<--, 0.72<--, 0.92<--, 0.95<--, 1.07<--, 1.27<--  PT/INR - ( 27 Aug 2020 01:09 )   PT: 11.6 sec;   INR: 0.97 ratio         PTT - ( 27 Aug 2020 01:09 )  PTT:20.1 sec  Urinalysis Basic - ( 26 Aug 2020 00:16 )    Color: Colorless / Appearance: Clear / S.034 / pH: x  Gluc: x / Ketone: Small  / Bili: Negative / Urobili: Negative   Blood: x / Protein: 30 mg/dL / Nitrite: Negative   Leuk Esterase: Small / RBC: 2 /hpf / WBC 10 /HPF   Sq Epi: x / Non Sq Epi: 1 /hpf / Bacteria: Few      Arterial Blood Gas:   @ 00:59  7.49/41/92/31/98/7.7  ABG lactate: --  Arterial Blood Gas:   @ 09:58  7.46/39/90/27/97/3.8  ABG lactate: --  Arterial Blood Gas:   @ 04:32  7.43/43/129/28/99/3.6  ABG lactate: --    Venous Blood Gas:   @ 04:32  7.40/54/46/33/77  VBG Lactate: 5.4  Venous Blood Gas:   @ 02:56  7.34/47/53/25/82  VBG Lactate: 9.0  Venous Blood Gas:   @ 01:27  7.44/44/42/29/76  VBG Lactate: 3.1    MICROBIOLOGY:     RADIOLOGY:  < from: Xray Chest 1 View AP/PA (20 @ 06:28) >  EXAM:  XR CHEST AP OR PA 1V                            PROCEDURE DATE:  2020      INTERPRETATION:  XR CHEST. One view.  INDICATION: intubation and OGT placement  COMPARISON: Same day at 12:05 AM    FINDINGS/  IMPRESSION:  Enteric tube within the stomach. Endotracheal tube 1 cm above the hung. Left IJ central line within SVC..  Clear lungs. No pleural effusion or pneumothorax.  < end of copied text >    EXAM:  CT ANGIO BRAIN (W)AW IC                        EXAM:  CT BRAIN                        EXAM:  CT ANGIO NECK (W)AW IC                      < from: CT Head No Cont (20 @ 01:18) >    IMPRESSION:  NONCONTRAST HEAD CT SCAN:  1. No CT evidence of acute intracranial hemorrhage, mass effect or acute territorial infarct.  2. There is a 1.3 cm lobulated bony density seen posterior to the to the left IAC in the left CP angle, nonspecific, may represent meningioma. Further evaluation with MRI can be obtained..    CT ANGIOGRAPHY NECK: Patent cervical vasculature.  No hemodynamically significant carotid stenosis or flow-limiting vertebral artery stenosis.  No evidence of dissection.    CT ANGIOGRAPHY BRAIN: No vessel occlusion, flow-limiting stenosis or aneurysm is identified about the [place].    < end of copied text >    EKG:    Cindy ABRAMS (ext [phone]) CHIEF COMPLAINT:  Patient is a 70y old  Female who presents with a chief complaint of HHS, Seizure (26 Aug 2020 13:21)    HPI:  69 yo female with PMHx of HTN, BERKLEY, GERD, Giant cell arthritis and gout presenting to [facility] ED evening of  with c/o generalized weakness. Per documentation, pt reported weakness with dysuria and urinary frequency x1 week as well as R sided dystonia/hand tremors for about 1 week. Pt and pt's daughter denying prior diagnosis of diabetes however unsure of when she last saw a doctor.     In ED pt afebrile, HR 80s, /102, CMP significant for elevated glucose of 796, elevated AG 22, HCO3 21, BHB 2.3. Pt received 1L IVF Bolus, started on insulin gtt with rapid correction of glucose 796 --> 653 --> 562. Pt also noted with lactic acidosis 9.6 --> 5.6 with pH 7.34/47. Pt received Labetalol 10mg IV x2, 20mg x1 for HTN urgency in ED. To note, pt responsive in ED and cooperative to questioning during interview. CTH revealing possible meningioma.    Pt admitted to MICU for further management of Hyperglycemic Hyperosmolar State however upon arrival to MICU pt found unresponsive with generalized seizures vs. myoclonus with metabolic encephalopathy requiring urgent intubation for airway protection. Pt received 4mg Ativan x1 and started on propofol gtt and versed gtt for seizure control. Neurology consulted and pt was Keppra IV loaded and started on 1g q12 IVPB and placed on vEEG. Pt momentarily became hypotensive requiring initiation of vasopressors marsha-intubation which quickly were d/c'd. Pt received another 1L IVF bolus and maintained on insulin gtt and LR at 250cc/hr.    Interval Events:  Pt titrated off versed gtt upon placement of vEEG on  and resumed on propofol gtt and Keppra IVPB 1g q12. As per Neurology, suspecting witnessed generalized seizure were d/t severe hyperglycemia plus fluctuation. To date, vEEG negative for seizure like activity. Pt started on Nicardipine gtt for SBP control for goal 140-160. Pt maintained on insulin gtt and started on D5WLR IVF when FS <300 with aggressive potassium repletion.    Overnight, pt weaned off Nicardipine gtt as well as propofol gtt and seen awake, alert, moving all extremities and localizing (reaching for tube, etc.) however not focusing or following commands. Precedex gtt started for agitation however caused hypotension and was discontinued. Pt successfully extubated this AM to aerosol face mask, AAOx1. Pt remains on insulin gtt with AG remaining closed.    REVIEW OF SYSTEMS:  unable to assess as pt intubated and sedated on mechanical ventilator.    OBJECTIVE:  ICU Vital Signs Last 24 Hrs  T(C): 37.2 (27 Aug 2020 04:00), Max: 37.8 (26 Aug 2020 16:00)  T(F): 99 (27 Aug 2020 04:00), Max: 100 (26 Aug 2020 16:00)  HR: 57 (27 Aug 2020 05:00) (57 - 112)  BP: 116/68 (27 Aug 2020 05:00) (116/68 - 135/59)  BP(mean): 87 (27 Aug 2020 05:00) (86 - 88)  ABP: 90/71 (27 Aug 2020 03:00) (90/71 - 192/106)  ABP(mean): 80 (27 Aug 2020 03:00) (80 - 146)  RR: 14 (27 Aug 2020 05:00) (14 - 15)  SpO2: 98% (27 Aug 2020 05:00) (97% - 100%)    Mode: AC/ CMV (Assist Control/ Continuous Mandatory Ventilation), RR (machine): 14, TV (machine): 500, FiO2: 40, PEEP: 5, ITime: 1, MAP: 11, PIP: 34     @ 07:  -   @ 07:00  --------------------------------------------------------  IN: 1944.8 mL / OUT: 1475 mL / NET: 469.8 mL     @ 07:  -   @ 05:20  --------------------------------------------------------  IN: 5678.9 mL / OUT: 4135 mL / NET: 1543.9 mL      CAPILLARY BLOOD GLUCOSE      POCT Blood Glucose.: 336 mg/dL (27 Aug 2020 04:56)    PHYSICAL EXAM:  Neuro:  sedated while on mechanical ventilator. Pupils 2 mm reactive equal. withdraws from painful stimuli in b/l upper and lower extremities.     Pulm:  orally intubated on mechanical ventilator, RR 14//FiO2 40/PEEP 5. diminished in lower lung fields bases to 1/4 up. Clear throughout, SPO2 98%.      CV:  cardiac monitor sinus without ectopy, + s1/s2, peripheral pulses palpable with radial 2+ bilat, dp/pt 1+/1+ bilat, digits warm to touch with good cap refill < 3 secs      GI/:  abd soft, obese, non distended. non tender, + hypoactive bowel sounds. contreras patent to bsd bladder non distended non palpable    Skin:  warm dry intact. without palpable nodes. without JVD appreciated    Lines: L Radial Artery A-line (), L IJ TLC ()      HOSPITAL MEDICATIONS:  MEDICATIONS  (STANDING):  atorvastatin 80 milliGRAM(s) Oral at bedtime  cefTRIAXone   IVPB 1000 milliGRAM(s) IV Intermittent every 24 hours  chlorhexidine 0.12% Liquid 15 milliLiter(s) Oral Mucosa every 12 hours  chlorhexidine 4% Liquid 1 Application(s) Topical <User Schedule>  dexMEDEtomidine Infusion 0.5 MICROgram(s)/kG/Hr (15.9 mL/Hr) IV Continuous <Continuous>  dextrose 5% + lactated ringers with potassium chloride 20 mEq/L 1000 milliLiter(s) (100 mL/Hr) IV Continuous <Continuous>  dextrose 5%. 1000 milliLiter(s) (50 mL/Hr) IV Continuous <Continuous>  dextrose 50% Injectable 12.5 Gram(s) IV Push once  dextrose 50% Injectable 25 Gram(s) IV Push once  dextrose 50% Injectable 25 Gram(s) IV Push once  enoxaparin Injectable 40 milliGRAM(s) SubCutaneous every 12 hours  insulin regular Infusion 4.5 Unit(s)/Hr (4.5 mL/Hr) IV Continuous <Continuous>  levETIRAcetam  IVPB 1000 milliGRAM(s) IV Intermittent every 12 hours  niCARdipine Infusion 5 mG/Hr (25 mL/Hr) IV Continuous <Continuous>  pantoprazole  Injectable 40 milliGRAM(s) IV Push daily  propofol Infusion 50 MICROgram(s)/kG/Min (38.1 mL/Hr) IV Continuous <Continuous>    MEDICATIONS  (PRN):  dextrose 40% Gel 15 Gram(s) Oral once PRN Blood Glucose LESS THAN 70 milliGRAM(s)/deciLiter  glucagon  Injectable 1 milliGRAM(s) IntraMuscular once PRN Glucose <70 milliGRAM(s)/deciLiter  sodium chloride 0.9% lock flush 10 milliLiter(s) IV Push every 1 hour PRN Pre/post blood products, medications, blood draw, and to maintain line patency      LABS:                        13.5   13.35 )-----------( 156      ( 27 Aug 2020 01:09 )             42.3     Hgb Trend: 13.5<--, 12.9<--, 14.4<--  0827    141  |  100  |  13  ----------------------------<  361<H>  3.6   |  26  |  0.79    Ca    8.7      27 Aug 2020 01:09  Phos  2.1       Mg     1.8         TPro  6.7  /  Alb  3.7  /  TBili  0.4  /  DBili  x   /  AST  28  /  ALT  48<H>  /  AlkPhos  93  08-26    LIVER FUNCTIONS - ( 26 Aug 2020 04:11 )  Alb: 3.7 g/dL / Pro: 6.7 g/dL / ALK PHOS: 93 U/L / ALT: 48 U/L / AST: 28 U/L / GGT: x           Creatinine Trend: 0.79<--, 0.72<--, 0.92<--, 0.95<--, 1.07<--, 1.27<--  PT/INR - ( 27 Aug 2020 01:09 )   PT: 11.6 sec;   INR: 0.97 ratio         PTT - ( 27 Aug 2020 01:09 )  PTT:20.1 sec  Urinalysis Basic - ( 26 Aug 2020 00:16 )    Color: Colorless / Appearance: Clear / S.034 / pH: x  Gluc: x / Ketone: Small  / Bili: Negative / Urobili: Negative   Blood: x / Protein: 30 mg/dL / Nitrite: Negative   Leuk Esterase: Small / RBC: 2 /hpf / WBC 10 /HPF   Sq Epi: x / Non Sq Epi: 1 /hpf / Bacteria: Few      Arterial Blood Gas:   @ 00:59  7.49/41/92/31/98/7.7  ABG lactate: --  Arterial Blood Gas:   @ 09:58  7.46/39/90/27/97/3.8  ABG lactate: --  Arterial Blood Gas:   @ 04:32  7.43/43/129/28/99/3.6  ABG lactate: --    Venous Blood Gas:   @ 04:32  7.40/54/46/33/77  VBG Lactate: 5.4  Venous Blood Gas:   @ 02:56  7.34/47/53/25/82  VBG Lactate: 9.0  Venous Blood Gas:   @ 01:27  7.44/44/42/29/76  VBG Lactate: 3.1    MICROBIOLOGY:     RADIOLOGY:  < from: Xray Chest 1 View AP/PA (20 @ 06:28) >  EXAM:  XR CHEST AP OR PA 1V                            PROCEDURE DATE:  2020      INTERPRETATION:  XR CHEST. One view.  INDICATION: intubation and OGT placement  COMPARISON: Same day at 12:05 AM    FINDINGS/  IMPRESSION:  Enteric tube within the stomach. Endotracheal tube 1 cm above the hung. Left IJ central line within SVC..  Clear lungs. No pleural effusion or pneumothorax.  < end of copied text >    EXAM:  CT ANGIO BRAIN (W)AW IC                        EXAM:  CT BRAIN                        EXAM:  CT ANGIO NECK (W)AW IC                      < from: CT Head No Cont (20 @ 01:18) >    IMPRESSION:  NONCONTRAST HEAD CT SCAN:  1. No CT evidence of acute intracranial hemorrhage, mass effect or acute territorial infarct.  2. There is a 1.3 cm lobulated bony density seen posterior to the to the left IAC in the left CP angle, nonspecific, may represent meningioma. Further evaluation with MRI can be obtained..    CT ANGIOGRAPHY NECK: Patent cervical vasculature.  No hemodynamically significant carotid stenosis or flow-limiting vertebral artery stenosis.  No evidence of dissection.    CT ANGIOGRAPHY BRAIN: No vessel occlusion, flow-limiting stenosis or aneurysm is identified about the [place].    < end of copied text >    EKG:    Cindy ABRAMS (ext [id number]) CHIEF COMPLAINT:  Patient is a 70y old  Female who presents with a chief complaint of HHS, Seizure (26 Aug 2020 13:21)    HPI:  69 yo female with PMHx of HTN, BERKLEY, GERD, Giant cell arthritis and gout presenting to [facility] ED evening of  with c/o generalized weakness. Per documentation, pt reported weakness with dysuria and urinary frequency x1 week as well as R sided dystonia/hand tremors for about 1 week. Pt and pt's daughter denying prior diagnosis of diabetes however unsure of when she last saw a doctor.     In ED pt afebrile, HR 80s, /102, CMP significant for elevated glucose of 796, elevated AG 22, HCO3 21, BHB 2.3. Pt received 1L IVF Bolus, started on insulin gtt with rapid correction of glucose 796 --> 653 --> 562. Pt also noted with lactic acidosis 9.6 --> 5.6 with pH 7.34/47. Pt received Labetalol 10mg IV x2, 20mg x1 for HTN urgency in ED. To note, pt responsive in ED and cooperative to questioning during interview. CTH revealing possible meningioma.    Pt admitted to MICU for further management of Hyperglycemic Hyperosmolar State however upon arrival to MICU pt found unresponsive with generalized seizures vs. myoclonus with metabolic encephalopathy requiring urgent intubation for airway protection. Pt received 4mg Ativan x1 and started on propofol gtt and versed gtt for seizure control. Neurology consulted and pt was Keppra IV loaded and started on 1g q12 IVPB and placed on vEEG. Pt momentarily became hypotensive requiring initiation of vasopressors marsha-intubation which quickly were d/c'd. Pt received another 1L IVF bolus and maintained on insulin gtt and LR at 250cc/hr.    Interval Events:  Pt titrated off versed gtt upon placement of vEEG on  and resumed on propofol gtt and Keppra IVPB 1g q12. As per Neurology, suspecting witnessed generalized seizure were d/t severe hyperglycemia plus fluctuation. To date, vEEG negative for seizure like activity. Pt started on Nicardipine gtt for SBP control for goal 140-160. Pt maintained on insulin gtt and started on D5WLR IVF when FS <300 with aggressive potassium repletion.    Overnight, pt weaned off Nicardipine gtt as well as propofol gtt and seen awake, alert, moving all extremities and localizing (reaching for tube, etc.) however not focusing or following commands. Precedex gtt started for agitation however caused hypotension and was discontinued. Pt successfully extubated this AM to aerosol face mask, AAOx1 and protecting airway. Pt remains on insulin gtt with AG remaining closed.    REVIEW OF SYSTEMS:  unable to assess as pt intubated and sedated on mechanical ventilator.    OBJECTIVE:  ICU Vital Signs Last 24 Hrs  T(C): 37.2 (27 Aug 2020 04:00), Max: 37.8 (26 Aug 2020 16:00)  T(F): 99 (27 Aug 2020 04:00), Max: 100 (26 Aug 2020 16:00)  HR: 57 (27 Aug 2020 05:00) (57 - 112)  BP: 116/68 (27 Aug 2020 05:00) (116/68 - 135/59)  BP(mean): 87 (27 Aug 2020 05:) (86 - 88)  ABP: 90/71 (27 Aug 2020 03:00) (90/71 - 192/106)  ABP(mean): 80 (27 Aug 2020 03:00) (80 - 146)  RR: 14 (27 Aug 2020 05:00) (14 - 15)  SpO2: 98% (27 Aug 2020 05:) (97% - 100%)    Mode: AC/ CMV (Assist Control/ Continuous Mandatory Ventilation), RR (machine): 14, TV (machine): 500, FiO2: 40, PEEP: 5, ITime: 1, MAP: 11, PIP: 34     @ 07:  -   @ 07:00  --------------------------------------------------------  IN: 1944.8 mL / OUT: 1475 mL / NET: 469.8 mL     @ 07:  -   @ 05:20  --------------------------------------------------------  IN: 5678.9 mL / OUT: 4135 mL / NET: 1543.9 mL      CAPILLARY BLOOD GLUCOSE      POCT Blood Glucose.: 336 mg/dL (27 Aug 2020 04:56)    PHYSICAL EXAM:  Neuro:  AAOx1, restless, answers to name and intermittently answering questions and following commands. Pupils 2 mm reactive equal.     Pulm:  On aerosol face mask. diminished in lower lung fields bases to 1/4 up. no rales, crackles, or wheezing. Clear throughout, SPO2 98%.      CV:  cardiac monitor sinus without ectopy, + s1/s2, peripheral pulses palpable with radial 2+ bilat, dp/pt 1+/1+ bilat, digits warm to touch with good cap refill < 3 secs      GI/:  abd soft, obese, non distended. non tender, + hypoactive bowel sounds. contreras patent to bsd bladder non distended non palpable    Skin:  warm dry intact. without palpable nodes. without JVD appreciated    Lines: L Radial Artery A-line (), L IJ TLC ()      HOSPITAL MEDICATIONS:  MEDICATIONS  (STANDING):  atorvastatin 80 milliGRAM(s) Oral at bedtime  cefTRIAXone   IVPB 1000 milliGRAM(s) IV Intermittent every 24 hours  chlorhexidine 0.12% Liquid 15 milliLiter(s) Oral Mucosa every 12 hours  chlorhexidine 4% Liquid 1 Application(s) Topical <User Schedule>  dexMEDEtomidine Infusion 0.5 MICROgram(s)/kG/Hr (15.9 mL/Hr) IV Continuous <Continuous>  dextrose 5% + lactated ringers with potassium chloride 20 mEq/L 1000 milliLiter(s) (100 mL/Hr) IV Continuous <Continuous>  dextrose 5%. 1000 milliLiter(s) (50 mL/Hr) IV Continuous <Continuous>  dextrose 50% Injectable 12.5 Gram(s) IV Push once  dextrose 50% Injectable 25 Gram(s) IV Push once  dextrose 50% Injectable 25 Gram(s) IV Push once  enoxaparin Injectable 40 milliGRAM(s) SubCutaneous every 12 hours  insulin regular Infusion 4.5 Unit(s)/Hr (4.5 mL/Hr) IV Continuous <Continuous>  levETIRAcetam  IVPB 1000 milliGRAM(s) IV Intermittent every 12 hours  niCARdipine Infusion 5 mG/Hr (25 mL/Hr) IV Continuous <Continuous>  pantoprazole  Injectable 40 milliGRAM(s) IV Push daily  propofol Infusion 50 MICROgram(s)/kG/Min (38.1 mL/Hr) IV Continuous <Continuous>    MEDICATIONS  (PRN):  dextrose 40% Gel 15 Gram(s) Oral once PRN Blood Glucose LESS THAN 70 milliGRAM(s)/deciLiter  glucagon  Injectable 1 milliGRAM(s) IntraMuscular once PRN Glucose <70 milliGRAM(s)/deciLiter  sodium chloride 0.9% lock flush 10 milliLiter(s) IV Push every 1 hour PRN Pre/post blood products, medications, blood draw, and to maintain line patency      LABS:                        13.5   13.35 )-----------( 156      ( 27 Aug 2020 01:09 )             42.3     Hgb Trend: 13.5<--, 12.9<--, 14.4<--  0827    141  |  100  |  13  ----------------------------<  361<H>  3.6   |  26  |  0.79    Ca    8.7      27 Aug 2020 01:09  Phos  2.1       Mg     1.8         TPro  6.7  /  Alb  3.7  /  TBili  0.4  /  DBili  x   /  AST  28  /  ALT  48<H>  /  AlkPhos  93  08-26    LIVER FUNCTIONS - ( 26 Aug 2020 04:11 )  Alb: 3.7 g/dL / Pro: 6.7 g/dL / ALK PHOS: 93 U/L / ALT: 48 U/L / AST: 28 U/L / GGT: x           Creatinine Trend: 0.79<--, 0.72<--, 0.92<--, 0.95<--, 1.07<--, 1.27<--  PT/INR - ( 27 Aug 2020 01:09 )   PT: 11.6 sec;   INR: 0.97 ratio         PTT - ( 27 Aug 2020 01:09 )  PTT:20.1 sec  Urinalysis Basic - ( 26 Aug 2020 00:16 )    Color: Colorless / Appearance: Clear / S.034 / pH: x  Gluc: x / Ketone: Small  / Bili: Negative / Urobili: Negative   Blood: x / Protein: 30 mg/dL / Nitrite: Negative   Leuk Esterase: Small / RBC: 2 /hpf / WBC 10 /HPF   Sq Epi: x / Non Sq Epi: 1 /hpf / Bacteria: Few      Arterial Blood Gas:   @ 00:59  7.49/41/92/31/98/7.7  ABG lactate: --  Arterial Blood Gas:   @ 09:58  7.46/39/90/27/97/3.8  ABG lactate: --  Arterial Blood Gas:   @ 04:32  7.43/43/129/28/99/3.6  ABG lactate: --    Venous Blood Gas:   @ 04:32  7.40/54/46/33/77  VBG Lactate: 5.4  Venous Blood Gas:   @ 02:56  7.34/47/53/25/82  VBG Lactate: 9.0  Venous Blood Gas:   @ 01:27  7.44/44/42/29/76  VBG Lactate: 3.1    MICROBIOLOGY:     RADIOLOGY:  < from: Xray Chest 1 View AP/PA (20 @ 06:28) >  EXAM:  XR CHEST AP OR PA 1V                            PROCEDURE DATE:  2020      INTERPRETATION:  XR CHEST. One view.  INDICATION: intubation and OGT placement  COMPARISON: Same day at 12:05 AM    FINDINGS/  IMPRESSION:  Enteric tube within the stomach. Endotracheal tube 1 cm above the hung. Left IJ central line within SVC..  Clear lungs. No pleural effusion or pneumothorax.  < end of copied text >    EXAM:  CT ANGIO BRAIN (W)AW IC                        EXAM:  CT BRAIN                        EXAM:  CT ANGIO NECK (W)AW IC                      < from: CT Head No Cont (20 @ 01:18) >    IMPRESSION:  NONCONTRAST HEAD CT SCAN:  1. No CT evidence of acute intracranial hemorrhage, mass effect or acute territorial infarct.  2. There is a 1.3 cm lobulated bony density seen posterior to the to the left IAC in the left CP angle, nonspecific, may represent meningioma. Further evaluation with MRI can be obtained..    CT ANGIOGRAPHY NECK: Patent cervical vasculature.  No hemodynamically significant carotid stenosis or flow-limiting vertebral artery stenosis.  No evidence of dissection.    CT ANGIOGRAPHY BRAIN: No vessel occlusion, flow-limiting stenosis or aneurysm is identified about the [place].    < end of copied text >    EKG:    Cindy ABRAMS (ext [id number]) CHIEF COMPLAINT:  Patient is a 70y old  Female who presents with a chief complaint of HHS, Seizure (26 Aug 2020 13:21)    HPI:  71 yo female with PMHx of HTN, BERKLEY, GERD, Giant cell arthritis and gout presenting to [facility] ED evening of  with c/o generalized weakness. Per documentation, pt reported weakness with dysuria and urinary frequency x1 week as well as R sided dystonia/hand tremors for about 1 week. Pt and pt's daughter denying prior diagnosis of diabetes however unsure of when she last saw a doctor.     In ED pt afebrile, HR 80s, /102, CMP significant for elevated glucose of 796, elevated AG 22, HCO3 21, BHB 2.3. Pt received 1L IVF Bolus, started on insulin gtt with rapid correction of glucose 796 --> 653 --> 562. Pt also noted with lactic acidosis 9.6 --> 5.6 with pH 7.34/47. Pt received Labetalol 10mg IV x2, 20mg x1 for HTN urgency in ED. To note, pt responsive in ED and cooperative to questioning during interview. CTH revealing possible meningioma.    Pt admitted to MICU for further management of Hyperglycemic Hyperosmolar State however upon arrival to MICU pt found unresponsive with generalized seizures vs. myoclonus with metabolic encephalopathy requiring urgent intubation for airway protection. Pt received 4mg Ativan x1 and started on propofol gtt and versed gtt for seizure control. Neurology consulted and pt was Keppra IV loaded and started on 1g q12 IVPB and placed on vEEG. Pt momentarily became hypotensive requiring initiation of vasopressors marsha-intubation which quickly were d/c'd. Pt received another 1L IVF bolus and maintained on insulin gtt and LR at 250cc/hr.    Interval Events:  Pt titrated off versed gtt upon placement of vEEG on  and resumed on propofol gtt and Keppra IVPB 1g q12. As per Neurology, suspecting witnessed generalized seizure were d/t severe hyperglycemia plus fluctuation. To date, vEEG negative for seizure like activity. Pt started on Nicardipine gtt for SBP control for goal 140-160. Pt maintained on insulin gtt and started on D5WLR IVF when FS <300 with aggressive potassium repletion.    Overnight, pt weaned off Nicardipine gtt as well as propofol gtt and seen awake, alert, moving all extremities and localizing (reaching for tube, etc.) however not focusing or following commands. Precedex gtt started for agitation however caused hypotension and was discontinued. Pt successfully extubated this AM to aerosol face mask, AAOx1 and protecting airway. Pt remains on insulin gtt with AG remaining closed.    REVIEW OF SYSTEMS:  unable to assess as pt not cooperative to questioning     OBJECTIVE:  ICU Vital Signs Last 24 Hrs  T(C): 37.2 (27 Aug 2020 04:00), Max: 37.8 (26 Aug 2020 16:00)  T(F): 99 (27 Aug 2020 04:00), Max: 100 (26 Aug 2020 16:00)  HR: 57 (27 Aug 2020 05:00) (57 - 112)  BP: 116/68 (27 Aug 2020 05:00) (116/68 - 135/59)  BP(mean): 87 (27 Aug 2020 05:00) (86 - 88)  ABP: 90/71 (27 Aug 2020 03:00) (90/71 - 192/106)  ABP(mean): 80 (27 Aug 2020 03:00) (80 - 146)  RR: 14 (27 Aug 2020 05:00) (14 - 15)  SpO2: 98% (27 Aug 2020 05:00) (97% - 100%)    Mode: AC/ CMV (Assist Control/ Continuous Mandatory Ventilation), RR (machine): 14, TV (machine): 500, FiO2: 40, PEEP: 5, ITime: 1, MAP: 11, PIP: 34    08 @ 07:  -   @ 07:00  --------------------------------------------------------  IN: 1944.8 mL / OUT: 1475 mL / NET: 469.8 mL     @ 07:  -   @ 05:20  --------------------------------------------------------  IN: 5678.9 mL / OUT: 4135 mL / NET: 1543.9 mL      CAPILLARY BLOOD GLUCOSE      POCT Blood Glucose.: 336 mg/dL (27 Aug 2020 04:56)    PHYSICAL EXAM:  Neuro:  AAOx1, restless, answers to name and intermittently answering questions and following commands. Pupils 2 mm reactive equal.     Pulm:  On aerosol face mask. diminished in lower lung fields bases to 1/4 up. no rales, crackles, or wheezing. Clear throughout, SPO2 98%.      CV:  cardiac monitor sinus without ectopy, + s1/s2, peripheral pulses palpable with radial 2+ bilat, dp/pt 1+/1+ bilat, digits warm to touch with good cap refill < 3 secs      GI/:  abd soft, obese, non distended. non tender, + hypoactive bowel sounds. contreras patent to bsd bladder non distended non palpable    Skin:  warm dry intact. without palpable nodes. without JVD appreciated    Lines: L Radial Artery A-line (), L IJ TLC ()      HOSPITAL MEDICATIONS:  MEDICATIONS  (STANDING):  atorvastatin 80 milliGRAM(s) Oral at bedtime  cefTRIAXone   IVPB 1000 milliGRAM(s) IV Intermittent every 24 hours  chlorhexidine 0.12% Liquid 15 milliLiter(s) Oral Mucosa every 12 hours  chlorhexidine 4% Liquid 1 Application(s) Topical <User Schedule>  dexMEDEtomidine Infusion 0.5 MICROgram(s)/kG/Hr (15.9 mL/Hr) IV Continuous <Continuous>  dextrose 5% + lactated ringers with potassium chloride 20 mEq/L 1000 milliLiter(s) (100 mL/Hr) IV Continuous <Continuous>  dextrose 5%. 1000 milliLiter(s) (50 mL/Hr) IV Continuous <Continuous>  dextrose 50% Injectable 12.5 Gram(s) IV Push once  dextrose 50% Injectable 25 Gram(s) IV Push once  dextrose 50% Injectable 25 Gram(s) IV Push once  enoxaparin Injectable 40 milliGRAM(s) SubCutaneous every 12 hours  insulin regular Infusion 4.5 Unit(s)/Hr (4.5 mL/Hr) IV Continuous <Continuous>  levETIRAcetam  IVPB 1000 milliGRAM(s) IV Intermittent every 12 hours  niCARdipine Infusion 5 mG/Hr (25 mL/Hr) IV Continuous <Continuous>  pantoprazole  Injectable 40 milliGRAM(s) IV Push daily  propofol Infusion 50 MICROgram(s)/kG/Min (38.1 mL/Hr) IV Continuous <Continuous>    MEDICATIONS  (PRN):  dextrose 40% Gel 15 Gram(s) Oral once PRN Blood Glucose LESS THAN 70 milliGRAM(s)/deciLiter  glucagon  Injectable 1 milliGRAM(s) IntraMuscular once PRN Glucose <70 milliGRAM(s)/deciLiter  sodium chloride 0.9% lock flush 10 milliLiter(s) IV Push every 1 hour PRN Pre/post blood products, medications, blood draw, and to maintain line patency      LABS:                        13.5   13.35 )-----------( 156      ( 27 Aug 2020 01:09 )             42.3     Hgb Trend: 13.5<--, 12.9<--, 14.4<--  0827    141  |  100  |  13  ----------------------------<  361<H>  3.6   |  26  |  0.79    Ca    8.7      27 Aug 2020 01:09  Phos  2.1       Mg     1.8         TPro  6.7  /  Alb  3.7  /  TBili  0.4  /  DBili  x   /  AST  28  /  ALT  48<H>  /  AlkPhos  93  26    LIVER FUNCTIONS - ( 26 Aug 2020 04:11 )  Alb: 3.7 g/dL / Pro: 6.7 g/dL / ALK PHOS: 93 U/L / ALT: 48 U/L / AST: 28 U/L / GGT: x           Creatinine Trend: 0.79<--, 0.72<--, 0.92<--, 0.95<--, 1.07<--, 1.27<--  PT/INR - ( 27 Aug 2020 01:09 )   PT: 11.6 sec;   INR: 0.97 ratio         PTT - ( 27 Aug 2020 01:09 )  PTT:20.1 sec  Urinalysis Basic - ( 26 Aug 2020 00:16 )    Color: Colorless / Appearance: Clear / S.034 / pH: x  Gluc: x / Ketone: Small  / Bili: Negative / Urobili: Negative   Blood: x / Protein: 30 mg/dL / Nitrite: Negative   Leuk Esterase: Small / RBC: 2 /hpf / WBC 10 /HPF   Sq Epi: x / Non Sq Epi: 1 /hpf / Bacteria: Few      Arterial Blood Gas:   @ 00:59  7.49/41/92/31/98/7.7  ABG lactate: --  Arterial Blood Gas:   @ 09:58  7.46/39/90/27/97/3.8  ABG lactate: --  Arterial Blood Gas:   @ 04:32  7.43/43/129/28/99/3.6  ABG lactate: --    Venous Blood Gas:   @ 04:32  7.40/54/46/33/77  VBG Lactate: 5.4  Venous Blood Gas:   @ 02:56  7.34/47/53/25/82  VBG Lactate: 9.0  Venous Blood Gas:   @ 01:27  7.44/44/42/29/76  VBG Lactate: 3.1    MICROBIOLOGY:     RADIOLOGY:  < from: Xray Chest 1 View AP/PA (20 @ 06:28) >  EXAM:  XR CHEST AP OR PA 1V                            PROCEDURE DATE:  2020      INTERPRETATION:  XR CHEST. One view.  INDICATION: intubation and OGT placement  COMPARISON: Same day at 12:05 AM    FINDINGS/  IMPRESSION:  Enteric tube within the stomach. Endotracheal tube 1 cm above the hung. Left IJ central line within SVC..  Clear lungs. No pleural effusion or pneumothorax.  < end of copied text >    EXAM:  CT ANGIO BRAIN (W)AW IC                        EXAM:  CT BRAIN                        EXAM:  CT ANGIO NECK (W)AW IC                      < from: CT Head No Cont (20 @ 01:18) >    IMPRESSION:  NONCONTRAST HEAD CT SCAN:  1. No CT evidence of acute intracranial hemorrhage, mass effect or acute territorial infarct.  2. There is a 1.3 cm lobulated bony density seen posterior to the to the left IAC in the left CP angle, nonspecific, may represent meningioma. Further evaluation with MRI can be obtained..    CT ANGIOGRAPHY NECK: Patent cervical vasculature.  No hemodynamically significant carotid stenosis or flow-limiting vertebral artery stenosis.  No evidence of dissection.    CT ANGIOGRAPHY BRAIN: No vessel occlusion, flow-limiting stenosis or aneurysm is identified about the [place].    < end of copied text >    EKG:    Cindy ABRAMS (ext [id number])

## 2020-08-27 NOTE — EEG REPORT - NS EEG TEXT BOX
[facility]   COMPREHENSIVE EPILEPSY CENTER   REPORT OF LONG-TERM VIDEO EEG     [facility]: [address] [place] [address] [address], [place] [address], Ph#: [phone]    Patient Name: ANA M JOHNSON  Age and : 70y (48)  MRN #: [id number]  Location: [facility] 514 W1  Referring Physician: Maxwell Pappas    Start Time/Date: x:x or 08:00 on   End Time/Date: 08:00 on   Duration:    _____________________________________________________________  STUDY INFORMATION    EEG Recording Technique:  The patient underwent continuous Video-EEG monitoring, using Telemetry System hardware on the XLTek Digital System. EEG and video data were stored on a computer hard drive with important events saved in digital archive files. The material was reviewed by a physician (electroencephalographer / epileptologist) on a daily basis. Jamil and seizure detection algorithms were utilized and reviewed. An EEG Technician attended to the patient, and was available throughout daytime work hours.  The epilepsy center neurologist was available in person or on call 24-hours per day.    EEG Placement and Labeling of Electrodes:  The EEG was performed utilizing 20 channel referential EEG connections (coronal over temporal over parasagittal montage) using all standard 10-20 electrode placements with EKG, with additional electrodes placed in the inferior temporal region using the modified 10-10 montage electrode placements for elective admissions, or if deemed necessary. Recording was at a sampling rate of 256 samples per second per channel. Time synchronized digital video recording was done simultaneously with EEG recording. A low light infrared camera was used for low light recording.     _____________________________________________________________  HISTORY    Patient is a 70y old  Female who presents with a chief complaint of HHS, Seizure.      PERTINENT MEDICATION:    dexMEDEtomidine Infusion 0.2 MICROgram(s)/kG/Hr (6.35 mL/Hr) IV Continuous <Continuous>  levETIRAcetam  IVPB 1000 milliGRAM(s) IV Intermittent every 12 hours      _____________________________________________________________  STUDY INTERPRETATION    Findings: The background was continuous, spontaneously variable and reactive.  No PDR seen. Limited eval due to artifacts throughout the study.     Background Slowing:    Diffuse theta and polymorphic delta slowing.    Focal Slowing:   None were present.      Sleep Background:  Drowsiness was characterized by fragmentation, attenuation, and slowing of the background activity.    Sleep was characterized by the presence of vertex waves, symmetric sleep spindles.      Other Non-Epileptiform Findings:  None were present.    Interictal Epileptiform Activity:   None were present.    Events:  Clinical events: None recorded.  Seizures: None recorded.    Activation Procedures:   Hyperventilation was not performed.    Photic stimulation was not performed.     Artifacts:  Intermittent myogenic and movement artifacts were noted.    ECG:  The heart rate on single channel ECG was predominantly between 72-78 BPM.    _____________________________________________________________  EEG SUMMARY/CLASSIFICATION    Abnormal EEG in an sedated patient.    - Moderate generalized slowing.    _____________________________________________________________  EEG IMPRESSION/CLINICAL CORRELATE    Abnormal EEG study.  1. Moderate nonspecific diffuse or multifocal cerebral dysfunction.   2. No seizure seen.  3. Limited eval due to artifacts     _____________________________________________________________    Prelim read    Tomasz Ford   Epilepsy Fellow  [facility] [place]

## 2020-08-27 NOTE — PROGRESS NOTE ADULT - ASSESSMENT
70y R-handed F with h/o glaucoma, morbid obesity, chronic shoulder pain, gout, B/L LE weakness with knee issues, GCA and HTN who p/w 2 weeks of generalized weakness and 2 days of R. arm and leg tremor.  While in ED, several episodes lasting 30-60 seconds noted with painful spasm of neck to right a/w 1-3 Hz low amplitude shaking of R. arm and R. leg with preserved awareness and consciousness w/o tongue biting, gaze deviation, incontinence or post event confusion.  Attempted sensory trick/ geste antagoniste and did not improve episodes. PT was also notably hypertensive to 206/102 and there was initial concern for vascular induced vs HTN induced L. sided lesion causing acute new onset right sided dystonia.  Upon further lab review, PT found to be in hyperosmolar hyperglycemic state w/ glucose >700.  Per ED and MICU team, PT continued to have several of these episodes but then had two generalized clonic events with acute stupor and dilated pupils lasting up to 2 minutes.  Due to concern for GTCs, PT was given ativan x2, intubated for airway protection and started on propofol and versed gtt. Currently extubated.    08/26/20 CT Head:   1. No CT evidence of acute intracranial hemorrhage, mass effect or acute territorial infarct.  2. There is a 1.3 cm lobulated bony density seen posterior to the to the left IAC in the left CP angle, nonspecific, may represent meningioma. Further evaluation with MRI can be obtained..  08/26/20 CTA NECK: Patent cervical vasculature.  No hemodynamically significant carotid stenosis or flow-limiting vertebral artery stenosis.  No evidence of dissection.  08/26/20 CTA BRAIN: No vessel occlusion, flow-limiting stenosis or aneurysm is identified about the [place].    Impression: Acute hyperglycemia induced dystonia with tremor vs. possible R. sided EPC from left brain dysfunction and then subsequent generalized seizure. Extubated, off sedation, mental status not showing improvement    Plan  [x] for now c/w keppra 1 g IV BID for now.   [] f/u official EEG reading  [] Ativan 2mg IVP for any GTC and/or focal seizure >3 min.   [] Seizure precautions  [] once EEG reading up, please order MRI head w/wo contrast, okay to discontinue EEG UNLESS patient having concerning EEG findings/subclinical seizures  [] Continue ASA 81 daily, atorvastatin 80 with concern of possible stroke as new onset focal lesion.    case seen and mgmt d/w Neurology Attending Dr. Cabral

## 2020-08-27 NOTE — PROGRESS NOTE ADULT - ASSESSMENT
Assessment: 71 yo female with PMHx of HTN, BERKLEY, GERD, Giant cell Arthritis, Gout presents to [facility] 8/25 with c/o dysuria/polyuria/polydipsia, right upper extremity tremors/dystonia x1-2 weeks found to be in hypertensive urgency to SBP 200s and Hyperglycemic Hyperosmolar State () and Lactic Acidosis accepted to MICU for further management c/b generalized seizure/AMS of unclear etiology, most likely d/t rapid correction of blood sugar and osmolar shift requiring intubation for airway protection.    Plan:    #Neuro: Generalized seizure/Encephalopathy of unclear etiology? likely d/t rapid correction of blood sugar with osmolar shift  - no h/o seizure like activity as per family, no new medications/toxins, signs of infection or other inducing factors evident at this time  - on Cyclobenzaprine 10mg q8 and Hydroxyzine Hydrochloride 20 mg qd at home  - s/p Ativan 4mg IV x1, Started on Propofol gtt and Versed gtt with clinical improvement of seizure like activity  - will wean off versed gtt for now and monitor on propofol gtt only  - s/p Keppra IV load, continued on 1g q12 for now   - vEEG placement, f/u read  - CTH with possibly meningioma, as per Neurology no need for f/u CTH at this time, consider MRI in near future however vEEG takes precedence at present  - f/u Neurology recommendations   - continue neuro checks as per protocol  - continue seizure precautions     #Pulm: Acute hypoxemic Respiratory Failure s/p intubation for airway protection i/s/o AMS/generalized seizure activity   - continue ventilator support for now, on minimal vent settings 14/500/40/5  - continue to wean FiO2 as tolerated     #CV: Hypertensive urgency () s/p total Labetalol 40 IV  - on lisinopril at home   - will start Cardene gtt for a goal of SBP 140s-160s  - will consider titrating off gtt and initiating PO/IV meds after weaning of sedation   - f/u TTE    #GI/: No active issues  - dysuria/urinary frequency likely i/s/o HHS  - NPO for now while on insulin gtt, will start tube feeds when transitioning regimen    #ID: Afebrile, no leukocytosis at presentation   - started on Ceftriaxone 2g qd for empiric meningitis coverage  - de-escalated to Ceftriaxone 1g qd  for empiric aspiration PNA coverage and f/u culture results (Day #2 of Abx today 8/27)  - blood culture negative 8/26, sputum culture with few GNR, will f/u speciations and sensitivities   - consider LP if no improvement in mental status after weaning sedation/no evidence of seizure like activity and resolution of HHS  - WBC 8.96 --> 13.35 today, will continue to monitor; remains afebrile     #FEN/ENDO: Hyperglycemic Hyperosmolar State  - p/w , AG 22, HCO3 21, BHB 2.3, pH 7.44  - p/w  corrected to 562 about 2.5 hrs s/p initiation of insulin gtt  - will maintain on insulin gtt and LR at 250cc/hr  - Endocrine consult   - f/u BMP/Mg/Phos q4 hrs for now  - continue Potassium repletion   - f/u Hgb A1C  Hypernatremia, corrected Na 145--> 147 --> 152  - will likely add D5W to IVF when FS <300, will f/u BMP q4 hrs   Lactic Acidosis to 9.6, pH 7.34  - now improved    #HEME:  No active issues  - DVT ppx with Lovenox 40q12 Assessment: 69 yo female with PMHx of HTN, BERKLEY, GERD, Giant cell Arthritis, Gout presents to [facility] 8/25 with c/o dysuria/polyuria/polydipsia, right upper extremity tremors/dystonia x1-2 weeks found to be in hypertensive urgency to SBP 200s and Hyperglycemic Hyperosmolar State () and Lactic Acidosis accepted to MICU for further management c/b generalized seizure/AMS of unclear etiology, most likely d/t rapid correction of blood sugar and osmolar shift requiring intubation for airway protection.    Plan:    #Neuro: Generalized seizure/Encephalopathy of unclear etiology? likely d/t rapid correction of blood sugar with osmolar shift  - no h/o seizure like activity as per family, no new medications/toxins, signs of infection or other inducing factors evident at this time  - on Cyclobenzaprine 10mg q8 and Hydroxyzine Hydrochloride 20 mg qd at home  - s/p Ativan 4mg IV x1, Started on Propofol gtt and Versed gtt with clinical improvement of seizure like activity  - versed gtt d/c'd 8/26  - s/p Keppra IV load, continued on 1g q12 for now   - remained on propofol gtt overnight 8/26-8/27, did not tolerate precedex gtt d/t hypotension   - vEEG placement, f/u read  - CTH with possibly meningioma, as per Neurology no need for f/u CTH at this time, consider MRI in near future however vEEG takes precedence at present  - f/u Neurology recommendations   - continue neuro checks as per protocol  - continue seizure precautions   - now successfully extubated 8/27, AAOx1, agitated and moving all extremities, intermittently following commands and focusing on examiner     #Pulm: Acute hypoxemic Respiratory Failure s/p intubation for airway protection i/s/o AMS/generalized seizure activity   - successfully extubated to aerosol mask 8/27 AM   - will keep Bipap on standby  - ABG PRN   - suctioning PRN     #CV: Hypertensive urgency () s/p total Labetalol 40 IV on admission   - on lisinopril at home   - started on Cardene gtt for a goal of SBP 140s-160s on 8/26 AM weaned off 17:00  - f/u TTE  - continue to monitor BP s/p extubation and begin PO/IV regimen as tolerated     #GI/: No active issues  - dysuria/polydipsia i/s/o HHS  - NPO for now while on insulin gtt, will start PO diet when transitioned  - speech/swallow s/p extubation     #ID: Afebrile, no leukocytosis at presentation   - started on Ceftriaxone 2g qd for empiric meningitis coverage  - de-escalated to Ceftriaxone 1g qd  for empiric aspiration PNA coverage and f/u culture results (Day #2 of Abx today 8/27)  - blood culture negative 8/26, sputum culture with few GNR, will f/u speciations and sensitivities   - consider LP if no improvement in mental status after weaning sedation/no evidence of seizure like activity and resolution of HHS  - WBC 8.96 --> 13.35 today, will continue to monitor; remains afebrile     #FEN/ENDO: Hyperglycemic Hyperosmolar State  - p/w , AG 22, HCO3 21, BHB 2.3, pH 7.44  - p/w  corrected to 562 about 2.5 hrs s/p initiation of insulin gtt  - AG closed x5 now, will transition to basal bolus regimen   - Endocrine consult/ f.u reccs   - f/u BMP/Mg/Phos q4-6 hrs for now  - continue Potassium repletion   - f/u Hgb A1C  Lactic Acidosis to 9.6, pH 7.34  - now improved    #HEME:  No active issues  - DVT ppx with Lovenox 40q12 Assessment: 69 yo female with PMHx of HTN, BERKLEY, GERD, Giant cell Arthritis, Gout presents to [facility] 8/25 with c/o dysuria/polyuria/polydipsia, right upper extremity tremors/dystonia x1-2 weeks found to be in hypertensive urgency to SBP 200s and Hyperglycemic Hyperosmolar State () and Lactic Acidosis accepted to MICU for further management c/b generalized seizure/AMS of unclear etiology, most likely d/t rapid correction of blood sugar and osmolar shift requiring intubation for airway protection.    Plan:    #Neuro: Generalized seizure/Encephalopathy of unclear etiology? likely d/t rapid correction of blood sugar with osmolar shift  - no h/o seizure like activity as per family, no new medications/toxins, signs of infection or other inducing factors evident at this time  - on Cyclobenzaprine 10mg q8 and Hydroxyzine Hydrochloride 20 mg qd at home  - s/p Ativan 4mg IV x1, Started on Propofol gtt and Versed gtt with clinical improvement of seizure like activity  - versed gtt d/c'd 8/26  - s/p Keppra IV load, continued on 1g q12 for now - will f/u with Neuro regarding possibly decreasing dosage as pt is now delirious and no signs of seizure like acivity  - remained on propofol gtt overnight 8/26-8/27, did not tolerate precedex gtt d/t hypotension   - vEEG placement, f/u read  - CTH with possibly meningioma, as per Neurology no need for f/u CTH at this time, consider MRI if no improvement in mental status   - f/u Neurology recommendations   - continue neuro checks as per protocol  - continue seizure precautions   - now successfully extubated 8/27, AAOx1, agitated and moving all extremities, intermittently following commands and focusing on examiner, will continue to monitor neuro status     #Pulm: Acute hypoxemic Respiratory Failure s/p intubation for airway protection i/s/o AMS/generalized seizure activity   - successfully extubated to aerosol mask 8/27 AM, ABG s/p extubation 7.49/42/94/32/98%  - ABG PRN   - will wean to nasal cannula as tolerated for SpO2 >90%  - suctioning PRN     #CV: Hypertensive urgency () s/p total Labetalol 40 IV on admission   - on lisinopril at home   - started on Cardene gtt for a goal of SBP 140s-160s on 8/26 AM weaned off 17:00  - f/u TTE  - continue to monitor BP s/p extubation and begin PO/IV regimen as tolerated     #GI/: No active issues  - dysuria/polydipsia i/s/o HHS  - NPO for now while on insulin gtt, will start PO diet when transitioned  - speech/swallow s/p extubation     #ID: Afebrile, no leukocytosis at presentation   - started on Ceftriaxone 2g qd for empiric meningitis coverage  - de-escalated to Ceftriaxone 1g qd  for empiric aspiration PNA coverage and f/u culture results (Day #2 of Abx today 8/27)  - blood culture negative 8/26, sputum culture with few GNR, will f/u speciations and sensitivities   - consider LP if no improvement in mental status after weaning sedation/no evidence of seizure like activity and resolution of HHS  - WBC 8.96 --> 13.35 today, will continue to monitor; remains afebrile     #FEN/ENDO: Hyperglycemic Hyperosmolar State  - p/w , AG 22, HCO3 21, BHB 2.3, pH 7.44  - p/w  corrected to 562 about 2.5 hrs s/p initiation of insulin gtt  - AG closed x5 now, will transition to basal bolus regimen   - Endocrine consult/ f.u reccs   - f/u BMP/Mg/Phos q4-6 hrs for now  - continue Potassium repletion   - f/u Hgb A1C  Lactic Acidosis to 9.6, pH 7.34  - now improved    #HEME:  No active issues  - DVT ppx with Lovenox 40q12 Assessment: 71 yo female with PMHx of HTN, BERKLEY, GERD, Giant cell Arthritis, Gout presents to [facility] 8/25 with c/o dysuria/polyuria/polydipsia, right upper extremity tremors/dystonia x1-2 weeks found to be in hypertensive urgency to SBP 200s and Hyperglycemic Hyperosmolar State () and Lactic Acidosis accepted to MICU for further management c/b generalized seizure/AMS of unclear etiology, most likely d/t rapid correction of blood sugar and osmolar shift requiring intubation for airway protection.    Plan:    #Neuro: Generalized seizure/Encephalopathy of unclear etiology? likely d/t rapid correction of blood sugar with osmolar shift  - no h/o seizure like activity as per family, no new medications/toxins, signs of infection or other inducing factors evident at this time  - on Cyclobenzaprine 10mg q8 and Hydroxyzine Hydrochloride 20 mg qd at home  - s/p Ativan 4mg IV x1, Started on Propofol gtt and Versed gtt with clinical improvement of seizure like activity  - versed gtt d/c'd 8/26  - s/p Keppra IV load, continued on 1g q12 for now - will f/u with Neuro regarding possibly decreasing dosage as pt is now delirious and no signs of seizure like acivity  - remained on propofol gtt overnight 8/26-8/27, did not tolerate precedex gtt d/t hypotension   - vEEG placement, f/u read  - CTH with possibly meningioma, as per Neurology no need for f/u CTH at this time, consider MRI if no improvement in mental status   - f/u Neurology recommendations   - continue neuro checks as per protocol  - continue seizure precautions   - now successfully extubated 8/27, AAOx1, agitated and moving all extremities, intermittently following commands and focusing on examiner, will continue to monitor neuro status     #Pulm: Acute hypoxemic Respiratory Failure s/p intubation for airway protection i/s/o AMS/generalized seizure activity   - successfully extubated to aerosol mask 8/27 AM, ABG s/p extubation 7.49/42/94/32/98%  - ABG PRN   - will wean to nasal cannula as tolerated for SpO2 >90%  - suctioning PRN     #CV: Hypertensive urgency () s/p total Labetalol 40 IV on admission   - on lisinopril at home   - started on Cardene gtt for a goal of SBP 140s-160s on 8/26 AM weaned off 17:00 and started again 8/27 AM for hypertension SBP 190s   - f/u TTE    #GI/: No active issues  - dysuria/polydipsia i/s/o HHS  - NPO for now while on insulin gtt, will start PO diet when transitioned  - speech/swallow s/p extubation     #ID: Afebrile, no leukocytosis at presentation   - started on Ceftriaxone 2g qd for empiric meningitis coverage  - de-escalated to Ceftriaxone 1g qd  for empiric aspiration PNA coverage and f/u culture results (Day #2 of Abx today 8/27)  - blood culture negative 8/26, sputum culture with few GNR, will f/u speciations and sensitivities   - consider LP if no improvement in mental status after weaning sedation/no evidence of seizure like activity and resolution of HHS  - WBC 8.96 --> 13.35 today, will continue to monitor; remains afebrile     #FEN/ENDO: Hyperglycemic Hyperosmolar State  - p/w , AG 22, HCO3 21, BHB 2.3, pH 7.44  - p/w  corrected to 562 about 2.5 hrs s/p initiation of insulin gtt  - AG closed x5 now, will transition to NPH/ISS  - Endocrine consult/ f.u reccs   - f/u BMP/Mg/Phos q4-6 hrs for now  - continue Potassium repletion   - f/u Hgb A1C  Lactic Acidosis to 9.6, pH 7.34  - now improved    #HEME:  No active issues  - DVT ppx with Lovenox 40q12

## 2020-08-27 NOTE — CONSULT NOTE ADULT - SUBJECTIVE AND OBJECTIVE BOX
HPI:  Pt is a 69 y/o F with history of HTN, Gout, obesity, presenting w/ b/l ankle swelling, fatigue/sleepiness and generalized weakness x several days. History obtained from daughter at bedside at pt is currently intubated and sedated. Per daughter, pt thought her blood sugar was low or high so daughter went out and bought glucometer to test BG and it read "error" 3 times. Pt subsequently had episode of tremors with Rt arm twitching and daughter was concerned that pt was having a stroke so brought her to ED.   In ED, pt pt was afebrile, HR 88, and /102. Labs significant for serum BG of 796, AG 22, bicarb 21, BHB2.3, lactate 9, WBC 10.88. UA negative for UTI. CXR showed clear lungs. CTH showing possible meningioma. She was given 2L IVF bolus and started on maintenance IVF with LR at 250/hr. Pt started on insulin gtt 5.7u/hr titrated down to 1u/hr then 2/hr for the last four hours.   Per daughter, pt has never been diagnosed with DM. States she follows with PMD regularly and was told by PMD at recent visit ~ 1 month ago that patient's A1C was "a little elevated" but "nothing to be concerned about" and PMD recommended watching diet, did not start medication for DM. Per daughter, pt did not have fever, chills, urinary complaints, cough, cold, sick contacts, recently. No recent changes to diet per daughter. Pt eats 2 meals a day (breakfast and lunch). no sugar drinks and rarely eats deserts. Does not exercise. Has family history of diabetes in uncles and cousins. Daughter states pt does not take steroids and is unsure why prednisone is still listed in medication list. States she took short course of prednisone last year (2019) but has not been on steroids since then.      PAST MEDICAL & SURGICAL HISTORY:  Glaucoma  Morbid obesity  Shoulder pain: radiating to neck  OA (osteoarthritis)  Gout  BERKLEY (obstructive sleep apnea): possible  H/O seasonal allergies  GERD (gastroesophageal reflux disease)  GCA (giant cell arteritis)  HTN (hypertension)  S/P tubal ligation  S/P appendectomy  S/P arthroscopic knee surgery: bilateral  S/P hysterectomy: julianna      FAMILY HISTORY:  family history of diabetes in uncles and cousins.     Social History:  No tobacco use or alcohol abuse    Outpatient Medications: Home Medications:  allopurinol 100 mg oral tablet: 1 tab(s) orally 2 times a day (26 Aug 2020 04:57)  Alphagan: 1 drop(s) to each affected eye 2 times a day (26 Aug 2020 04:57)  cyclobenzaprine 10 mg oral tablet: 1 tab(s) orally 3 times a day (26 Aug 2020 04:57)  hydrOXYzine hydrochloride 10 mg oral tablet: 2 tab(s) orally once a day, As Needed (26 Aug 2020 04:57)  lisinopril 40 mg oral tablet: 1 tab(s) orally once a day (at bedtime) (26 Aug 2020 04:57)      MEDICATIONS  (STANDING):  acetaminophen  IVPB .. 1000 milliGRAM(s) IV Intermittent once  atorvastatin 80 milliGRAM(s) Oral at bedtime  cefTRIAXone   IVPB 1000 milliGRAM(s) IV Intermittent every 24 hours  chlorhexidine 4% Liquid 1 Application(s) Topical <User Schedule>  dexMEDEtomidine Infusion 0.2 MICROgram(s)/kG/Hr (6.35 mL/Hr) IV Continuous <Continuous>  dextrose 5%. 1000 milliLiter(s) (50 mL/Hr) IV Continuous <Continuous>  dextrose 50% Injectable 12.5 Gram(s) IV Push once  dextrose 50% Injectable 25 Gram(s) IV Push once  dextrose 50% Injectable 25 Gram(s) IV Push once  enoxaparin Injectable 40 milliGRAM(s) SubCutaneous every 12 hours  insulin lispro (HumaLOG) corrective regimen sliding scale   SubCutaneous every 6 hours  insulin NPH human recombinant 5 Unit(s) SubCutaneous every 6 hours  insulin regular Infusion 2 Unit(s)/Hr (2 mL/Hr) IV Continuous <Continuous>  lactated ringers. 1000 milliLiter(s) (100 mL/Hr) IV Continuous <Continuous>  levETIRAcetam  IVPB 1000 milliGRAM(s) IV Intermittent every 12 hours  niCARdipine Infusion 2.5 mG/Hr (12.5 mL/Hr) IV Continuous <Continuous>  pantoprazole  Injectable 40 milliGRAM(s) IV Push daily  potassium chloride  20 mEq/100 mL IVPB 20 milliEquivalent(s) IV Intermittent every 2 hours  potassium phosphate IVPB 30 milliMole(s) IV Intermittent once    MEDICATIONS  (PRN):  dextrose 40% Gel 15 Gram(s) Oral once PRN Blood Glucose LESS THAN 70 milliGRAM(s)/deciLiter  glucagon  Injectable 1 milliGRAM(s) IntraMuscular once PRN Glucose <70 milliGRAM(s)/deciLiter      Allergies    No Known Allergies    Intolerances      Review of Systems:  Constitutional: No fever, chills   Neuro: No tremors, headache   Cardiovascular: No chest pain, palpitations  Respiratory: No SOB, no cough  GI: No nausea, vomiting, abdominal pain  : No dysuria, polyuria   Skin: no rash, ulcers   Psych: no depression, anxiety   Endocrine: no polyphagia, polydipsia     ALL OTHER SYSTEMS REVIEWED AND NEGATIVE        PHYSICAL EXAM:  VITALS: T(C): 36.2 (08-27-20 @ 12:30)  T(F): 97.2 (08-27-20 @ 12:30), Max: 100 (08-26-20 @ 16:00)  HR: 87 (08-27-20 @ 14:45) (56 - 112)  BP: 161/76 (08-27-20 @ 14:45) (103/64 - 198/106)  RR:  (14 - 38)  SpO2:  (85% - 99%)  Wt(kg): --  GENERAL: NAD, well-groomed, well-developed  EYES: No proptosis, anicteric  HEENT:  Atraumatic, Normocephalic, moist mucous membranes  THYROID: Normal size, no palpable nodules  RESPIRATORY: Clear to auscultation bilaterally; No rales, rhonchi, wheezing  CARDIOVASCULAR: Regular rate and rhythm; No murmurs  GI: Soft, nontender, non distended, normal bowel sounds  SKIN: Dry, intact, No rashes or lesions  NEURO: AOx3, moves all extremities spontaneously   PSYCH: Reactive affect, euthymic mood    POCT Blood Glucose.: 191 mg/dL (08-27-20 @ 14:53)  POCT Blood Glucose.: 202 mg/dL (08-27-20 @ 13:59)  POCT Blood Glucose.: 216 mg/dL (08-27-20 @ 13:14)  POCT Blood Glucose.: 217 mg/dL (08-27-20 @ 12:08)  POCT Blood Glucose.: 260 mg/dL (08-27-20 @ 11:09)  POCT Blood Glucose.: 240 mg/dL (08-27-20 @ 10:09)  POCT Blood Glucose.: 200 mg/dL (08-27-20 @ 08:22)  POCT Blood Glucose.: 312 mg/dL (08-27-20 @ 06:56)  POCT Blood Glucose.: 326 mg/dL (08-27-20 @ 06:06)  POCT Blood Glucose.: 336 mg/dL (08-27-20 @ 04:56)  POCT Blood Glucose.: 327 mg/dL (08-27-20 @ 03:56)  POCT Blood Glucose.: 290 mg/dL (08-27-20 @ 03:01)  POCT Blood Glucose.: 365 mg/dL (08-27-20 @ 02:07)  POCT Blood Glucose.: 283 mg/dL (08-27-20 @ 00:55)  POCT Blood Glucose.: 338 mg/dL (08-27-20 @ 00:00)  POCT Blood Glucose.: 324 mg/dL (08-26-20 @ 22:49)  POCT Blood Glucose.: 339 mg/dL (08-26-20 @ 21:51)  POCT Blood Glucose.: 343 mg/dL (08-26-20 @ 20:57)  POCT Blood Glucose.: 317 mg/dL (08-26-20 @ 19:56)  POCT Blood Glucose.: 348 mg/dL (08-26-20 @ 19:12)  POCT Blood Glucose.: 336 mg/dL (08-26-20 @ 18:29)  POCT Blood Glucose.: 303 mg/dL (08-26-20 @ 17:25)  POCT Blood Glucose.: 296 mg/dL (08-26-20 @ 16:34)  POCT Blood Glucose.: 331 mg/dL (08-26-20 @ 14:42)  POCT Blood Glucose.: 311 mg/dL (08-26-20 @ 13:32)  POCT Blood Glucose.: 330 mg/dL (08-26-20 @ 12:31)  POCT Blood Glucose.: 354 mg/dL (08-26-20 @ 11:03)  POCT Blood Glucose.: 420 mg/dL (08-26-20 @ 09:00)  POCT Blood Glucose.: 123 mg/dL (08-26-20 @ 08:58)  POCT Blood Glucose.: 473 mg/dL (08-26-20 @ 07:38)  POCT Blood Glucose.: 517 mg/dL (08-26-20 @ 06:08)  POCT Blood Glucose.: 550 mg/dL (08-26-20 @ 04:39)  POCT Blood Glucose.: 481 mg/dL (08-26-20 @ 03:28)  POCT Blood Glucose.: 515 mg/dL (08-26-20 @ 03:08)  POCT Blood Glucose.: 553 mg/dL (08-26-20 @ 02:29)  POCT Blood Glucose.: 562 mg/dL (08-26-20 @ 01:29)                            13.5   13.35 )-----------( 156      ( 27 Aug 2020 01:09 )             42.3       08-27    144  |  103  |  12  ----------------------------<  235<H>  3.2<L>   |  28  |  0.76    EGFR if : 91  EGFR if non : 79    Ca    8.4      08-27  Mg     1.6     08-27  Phos  2.3     08-27    TPro  6.7  /  Alb  3.7  /  TBili  0.4  /  DBili  x   /  AST  28  /  ALT  48<H>  /  AlkPhos  93  08-26      Thyroid Function Tests:          Hemoglobin A1C     Radiology: HPI:  Pt is a 71 y/o F with history of HTN, Gout, obesity, presenting w/ b/l ankle swelling, fatigue/sleepiness and generalized weakness x several days. History obtained from daughter at bedside at pt is currently intubated and sedated. Per daughter, pt thought her blood sugar was low or high so daughter went out and bought glucometer to test BG and it read "error" 3 times. Pt subsequently had episode of tremors with Rt arm twitching and daughter was concerned that pt was having a stroke so brought her to ED.   In ED, pt pt was afebrile, HR 88, and /102. Labs significant for serum BG of 796, AG 22, bicarb 21, BHB2.3, lactate 9, WBC 10.88. UA negative for UTI. CXR showed clear lungs. CTH showing possible meningioma. She was given 2L IVF bolus and started on maintenance IVF with LR at 250/hr. Pt started on insulin gtt 5.7u/hr titrated down to 1u/hr then 2/hr for the last four hours.   Per daughter, pt has never been diagnosed with DM. States she follows with PMD regularly and was told by PMD at recent visit ~ 1 month ago that patient's A1C was "a little elevated" but "nothing to be concerned about" and PMD recommended watching diet, did not start medication for DM. Per daughter, pt did not have fever, chills, urinary complaints, cough, cold, sick contacts, recently. No recent changes to diet per daughter. Pt eats 2 meals a day (breakfast and lunch). no sugar drinks and rarely eats deserts. Does not exercise. Has family history of diabetes in uncles and cousins. Daughter states pt does not take steroids and is unsure why prednisone is still listed in medication list. States she took short course of prednisone last year (2019) but has not been on steroids since then.      PAST MEDICAL & SURGICAL HISTORY:  Glaucoma  Morbid obesity  Shoulder pain: radiating to neck  OA (osteoarthritis)  Gout  BERKLEY (obstructive sleep apnea): possible  H/O seasonal allergies  GERD (gastroesophageal reflux disease)  GCA (giant cell arteritis)  HTN (hypertension)  S/P tubal ligation  S/P appendectomy  S/P arthroscopic knee surgery: bilateral  S/P hysterectomy: julianna      FAMILY HISTORY:  family history of diabetes in uncles and cousins.     Social History:  No tobacco use or alcohol abuse    Outpatient Medications: Home Medications:  allopurinol 100 mg oral tablet: 1 tab(s) orally 2 times a day (26 Aug 2020 04:57)  Alphagan: 1 drop(s) to each affected eye 2 times a day (26 Aug 2020 04:57)  cyclobenzaprine 10 mg oral tablet: 1 tab(s) orally 3 times a day (26 Aug 2020 04:57)  hydrOXYzine hydrochloride 10 mg oral tablet: 2 tab(s) orally once a day, As Needed (26 Aug 2020 04:57)  lisinopril 40 mg oral tablet: 1 tab(s) orally once a day (at bedtime) (26 Aug 2020 04:57)      MEDICATIONS  (STANDING):  acetaminophen  IVPB .. 1000 milliGRAM(s) IV Intermittent once  atorvastatin 80 milliGRAM(s) Oral at bedtime  cefTRIAXone   IVPB 1000 milliGRAM(s) IV Intermittent every 24 hours  chlorhexidine 4% Liquid 1 Application(s) Topical <User Schedule>  dexMEDEtomidine Infusion 0.2 MICROgram(s)/kG/Hr (6.35 mL/Hr) IV Continuous <Continuous>  dextrose 5%. 1000 milliLiter(s) (50 mL/Hr) IV Continuous <Continuous>  dextrose 50% Injectable 12.5 Gram(s) IV Push once  dextrose 50% Injectable 25 Gram(s) IV Push once  dextrose 50% Injectable 25 Gram(s) IV Push once  enoxaparin Injectable 40 milliGRAM(s) SubCutaneous every 12 hours  insulin lispro (HumaLOG) corrective regimen sliding scale   SubCutaneous every 6 hours  insulin NPH human recombinant 5 Unit(s) SubCutaneous every 6 hours  insulin regular Infusion 2 Unit(s)/Hr (2 mL/Hr) IV Continuous <Continuous>  lactated ringers. 1000 milliLiter(s) (100 mL/Hr) IV Continuous <Continuous>  levETIRAcetam  IVPB 1000 milliGRAM(s) IV Intermittent every 12 hours  niCARdipine Infusion 2.5 mG/Hr (12.5 mL/Hr) IV Continuous <Continuous>  pantoprazole  Injectable 40 milliGRAM(s) IV Push daily  potassium chloride  20 mEq/100 mL IVPB 20 milliEquivalent(s) IV Intermittent every 2 hours  potassium phosphate IVPB 30 milliMole(s) IV Intermittent once    MEDICATIONS  (PRN):  dextrose 40% Gel 15 Gram(s) Oral once PRN Blood Glucose LESS THAN 70 milliGRAM(s)/deciLiter  glucagon  Injectable 1 milliGRAM(s) IntraMuscular once PRN Glucose <70 milliGRAM(s)/deciLiter      Allergies    No Known Allergies    Intolerances      Review of Systems:  Unable to obtain due to sedation        PHYSICAL EXAM:  VITALS: T(C): 36.2 (08-27-20 @ 12:30)  T(F): 97.2 (08-27-20 @ 12:30), Max: 100 (08-26-20 @ 16:00)  HR: 87 (08-27-20 @ 14:45) (56 - 112)  BP: 161/76 (08-27-20 @ 14:45) (103/64 - 198/106)  RR:  (14 - 38)  SpO2:  (85% - 99%)  Wt(kg): --  GENERAL: obese, NAD, intubated, sedated  EYES: No proptosis, anicteric  HEENT:  Atraumatic, Normocephalic, moist mucous membranes  THYROID: Normal size, no palpable nodules  RESPIRATORY: Clear to auscultation bilaterally; No rales, rhonchi, wheezing  CARDIOVASCULAR: Regular rate and rhythm; No murmurs  GI: Soft, nontender, non distended, normal bowel sounds  SKIN: Dry, intact, No rashes or lesions  NEURO: sedated, opens eyes, follows some commands, not verbalizing    POCT Blood Glucose.: 191 mg/dL (08-27-20 @ 14:53)  POCT Blood Glucose.: 202 mg/dL (08-27-20 @ 13:59)  POCT Blood Glucose.: 216 mg/dL (08-27-20 @ 13:14)  POCT Blood Glucose.: 217 mg/dL (08-27-20 @ 12:08)  POCT Blood Glucose.: 260 mg/dL (08-27-20 @ 11:09)  POCT Blood Glucose.: 240 mg/dL (08-27-20 @ 10:09)  POCT Blood Glucose.: 200 mg/dL (08-27-20 @ 08:22)  POCT Blood Glucose.: 312 mg/dL (08-27-20 @ 06:56)  POCT Blood Glucose.: 326 mg/dL (08-27-20 @ 06:06)  POCT Blood Glucose.: 336 mg/dL (08-27-20 @ 04:56)  POCT Blood Glucose.: 327 mg/dL (08-27-20 @ 03:56)  POCT Blood Glucose.: 290 mg/dL (08-27-20 @ 03:01)  POCT Blood Glucose.: 365 mg/dL (08-27-20 @ 02:07)  POCT Blood Glucose.: 283 mg/dL (08-27-20 @ 00:55)  POCT Blood Glucose.: 338 mg/dL (08-27-20 @ 00:00)  POCT Blood Glucose.: 324 mg/dL (08-26-20 @ 22:49)  POCT Blood Glucose.: 339 mg/dL (08-26-20 @ 21:51)  POCT Blood Glucose.: 343 mg/dL (08-26-20 @ 20:57)  POCT Blood Glucose.: 317 mg/dL (08-26-20 @ 19:56)  POCT Blood Glucose.: 348 mg/dL (08-26-20 @ 19:12)  POCT Blood Glucose.: 336 mg/dL (08-26-20 @ 18:29)  POCT Blood Glucose.: 303 mg/dL (08-26-20 @ 17:25)  POCT Blood Glucose.: 296 mg/dL (08-26-20 @ 16:34)  POCT Blood Glucose.: 331 mg/dL (08-26-20 @ 14:42)  POCT Blood Glucose.: 311 mg/dL (08-26-20 @ 13:32)  POCT Blood Glucose.: 330 mg/dL (08-26-20 @ 12:31)  POCT Blood Glucose.: 354 mg/dL (08-26-20 @ 11:03)  POCT Blood Glucose.: 420 mg/dL (08-26-20 @ 09:00)  POCT Blood Glucose.: 123 mg/dL (08-26-20 @ 08:58)  POCT Blood Glucose.: 473 mg/dL (08-26-20 @ 07:38)  POCT Blood Glucose.: 517 mg/dL (08-26-20 @ 06:08)  POCT Blood Glucose.: 550 mg/dL (08-26-20 @ 04:39)  POCT Blood Glucose.: 481 mg/dL (08-26-20 @ 03:28)  POCT Blood Glucose.: 515 mg/dL (08-26-20 @ 03:08)  POCT Blood Glucose.: 553 mg/dL (08-26-20 @ 02:29)  POCT Blood Glucose.: 562 mg/dL (08-26-20 @ 01:29)                            13.5   13.35 )-----------( 156      ( 27 Aug 2020 01:09 )             42.3       08-27    144  |  103  |  12  ----------------------------<  235<H>  3.2<L>   |  28  |  0.76    EGFR if : 91  EGFR if non : 79    Ca    8.4      08-27  Mg     1.6     08-27  Phos  2.3     08-27    TPro  6.7  /  Alb  3.7  /  TBili  0.4  /  DBili  x   /  AST  28  /  ALT  48<H>  /  AlkPhos  93  08-26      Thyroid Function Tests:          Hemoglobin A1C   A1C with Estimated Average Glucose Result: 9.0 (08.26.20 @ 05:50)        Radiology:

## 2020-08-27 NOTE — EEG REPORT - NS EEG TEXT BOX
[facility]   COMPREHENSIVE EPILEPSY CENTER   REPORT OF LONG-TERM VIDEO EEG     [facility]: [address], [place] [address], Ph#: [phone]  [facility]: [id number] [place] [address], Ph#: [phone]  [facility]: [address] [place] [address], Ph#: [phone]    Patient Name: ANA M JOHNSON  Age and : 70y (48)  MRN #: [id number]  Location: [facility] 514 W1  Referring Physician: Maxwell Pappas    Start Time/Date: 09:28 on 20  End Time/Date: 08:00 on 20  Duration: 22hr 21m    _____________________________________________________________  STUDY INFORMATION    EEG Recording Technique:  The patient underwent continuous Video-EEG monitoring, using Telemetry System hardware on the XLTek Digital System. EEG and video data were stored on a computer hard drive with important events saved in digital archive files. The material was reviewed by a physician (electroencephalographer / epileptologist) on a daily basis. Jamil and seizure detection algorithms were utilized and reviewed. An EEG Technician attended to the patient, and was available throughout daytime work hours.  The epilepsy center neurologist was available in person or on call 24-hours per day.    EEG Placement and Labeling of Electrodes:  The EEG was performed utilizing 20 channel referential EEG connections (coronal over temporal over parasagittal montage) using all standard 10-20 electrode placements with EKG, with additional electrodes placed in the inferior temporal region using the modified 10-10 montage electrode placements for elective admissions, or if deemed necessary. Recording was at a sampling rate of 256 samples per second per channel. Time synchronized digital video recording was done simultaneously with EEG recording. A low light infrared camera was used for low light recording.     _____________________________________________________________  HISTORY    Patient is a 70y old  Female who presents with a chief complaint of HHS, Seizure (27 Aug 2020 11:25)      PERTINENT MEDICATION:  levETIRAcetam  IVPB 1000 milliGRAM(s) IV Intermittent every 12 hours    _____________________________________________________________  STUDY INTERPRETATION    Findings: The background was continuous, spontaneously variable and reactive. No posterior dominant rhythm seen.    Background Slowing:  Diffuse theta and polymorphic delta slowing.    Focal Slowing:   None were present.    Sleep Background:  Drowsiness was characterized by fragmentation, attenuation, and slowing of the background activity.    Stage II sleep transients were not recorded.    Other Findings:  Intermittent sharply contoured waveforms in the bilateral frontocentral (max Fp2/F4/Fp1/F3/Fz) region of unclear significant.     Events:  Clinical events: None recorded.  Seizures: None recorded.    Activation Procedures:   Hyperventilation was not performed.    Photic stimulation was not performed.     Artifacts:  Intermittent myogenic and movement artifacts were noted.    ECG:  The heart rate on single channel ECG was predominantly between 70-80 BPM.    _____________________________________________________________  EEG SUMMARY/CLASSIFICATION    Abnormal EEG in an altered patient.  - Intermittent sharply contoured waveforms in the bilateral frontocentral (max Fp2/F4/Fp1/F3/Fz) region of unclear significant.   - Moderate to severe generalized slowing.    _____________________________________________________________  EEG IMPRESSION/CLINICAL CORRELATE    Abnormal EEG study.  1. Intermittent sharply contoured waveforms in the bilateral frontocentral region of unclear significant.   2. Moderate to severe nonspecific diffuse or multifocal cerebral dysfunction.   3. No seizure seen.    Findings were discussed with Neurology service.     _____________________________________________________________    Nunu Deleon MD  Attending Physician, [facility]

## 2020-08-28 LAB
-  AMIKACIN: SIGNIFICANT CHANGE UP
-  AZTREONAM: SIGNIFICANT CHANGE UP
-  CEFEPIME: SIGNIFICANT CHANGE UP
-  CEFTAZIDIME: SIGNIFICANT CHANGE UP
-  CIPROFLOXACIN: SIGNIFICANT CHANGE UP
-  GENTAMICIN: SIGNIFICANT CHANGE UP
-  IMIPENEM: SIGNIFICANT CHANGE UP
-  LEVOFLOXACIN: SIGNIFICANT CHANGE UP
-  MEROPENEM: SIGNIFICANT CHANGE UP
-  PIPERACILLIN/TAZOBACTAM: SIGNIFICANT CHANGE UP
-  TOBRAMYCIN: SIGNIFICANT CHANGE UP
ANION GAP SERPL CALC-SCNC: 12 MMOL/L — SIGNIFICANT CHANGE UP (ref 5–17)
ANION GAP SERPL CALC-SCNC: 15 MMOL/L — SIGNIFICANT CHANGE UP (ref 5–17)
APTT BLD: 29.1 SEC — SIGNIFICANT CHANGE UP (ref 27.5–35.5)
BUN SERPL-MCNC: 12 MG/DL — SIGNIFICANT CHANGE UP (ref 7–23)
BUN SERPL-MCNC: 14 MG/DL — SIGNIFICANT CHANGE UP (ref 7–23)
CALCIUM SERPL-MCNC: 8 MG/DL — LOW (ref 8.4–10.5)
CALCIUM SERPL-MCNC: 8.1 MG/DL — LOW (ref 8.4–10.5)
CHLORIDE SERPL-SCNC: 103 MMOL/L — SIGNIFICANT CHANGE UP (ref 96–108)
CHLORIDE SERPL-SCNC: 104 MMOL/L — SIGNIFICANT CHANGE UP (ref 96–108)
CHOLEST SERPL-MCNC: 150 MG/DL — SIGNIFICANT CHANGE UP (ref 10–199)
CO2 SERPL-SCNC: 25 MMOL/L — SIGNIFICANT CHANGE UP (ref 22–31)
CO2 SERPL-SCNC: 27 MMOL/L — SIGNIFICANT CHANGE UP (ref 22–31)
CREAT SERPL-MCNC: 0.76 MG/DL — SIGNIFICANT CHANGE UP (ref 0.5–1.3)
CREAT SERPL-MCNC: 0.8 MG/DL — SIGNIFICANT CHANGE UP (ref 0.5–1.3)
CULTURE RESULTS: SIGNIFICANT CHANGE UP
GLUCOSE BLDC GLUCOMTR-MCNC: 189 MG/DL — HIGH (ref 70–99)
GLUCOSE BLDC GLUCOMTR-MCNC: 218 MG/DL — HIGH (ref 70–99)
GLUCOSE BLDC GLUCOMTR-MCNC: 230 MG/DL — HIGH (ref 70–99)
GLUCOSE BLDC GLUCOMTR-MCNC: 233 MG/DL — HIGH (ref 70–99)
GLUCOSE SERPL-MCNC: 250 MG/DL — HIGH (ref 70–99)
GLUCOSE SERPL-MCNC: 277 MG/DL — HIGH (ref 70–99)
HCT VFR BLD CALC: 37.9 % — SIGNIFICANT CHANGE UP (ref 34.5–45)
HDLC SERPL-MCNC: 67 MG/DL — SIGNIFICANT CHANGE UP
HGB BLD-MCNC: 12.3 G/DL — SIGNIFICANT CHANGE UP (ref 11.5–15.5)
INR BLD: 1.04 RATIO — SIGNIFICANT CHANGE UP (ref 0.88–1.16)
LIPID PNL WITH DIRECT LDL SERPL: 62 MG/DL — SIGNIFICANT CHANGE UP
MAGNESIUM SERPL-MCNC: 2 MG/DL — SIGNIFICANT CHANGE UP (ref 1.6–2.6)
MAGNESIUM SERPL-MCNC: 2 MG/DL — SIGNIFICANT CHANGE UP (ref 1.6–2.6)
MCHC RBC-ENTMCNC: 30.1 PG — SIGNIFICANT CHANGE UP (ref 27–34)
MCHC RBC-ENTMCNC: 32.5 GM/DL — SIGNIFICANT CHANGE UP (ref 32–36)
MCV RBC AUTO: 92.7 FL — SIGNIFICANT CHANGE UP (ref 80–100)
METHOD TYPE: SIGNIFICANT CHANGE UP
NRBC # BLD: 0 /100 WBCS — SIGNIFICANT CHANGE UP (ref 0–0)
ORGANISM # SPEC MICROSCOPIC CNT: SIGNIFICANT CHANGE UP
ORGANISM # SPEC MICROSCOPIC CNT: SIGNIFICANT CHANGE UP
PHOSPHATE SERPL-MCNC: 2.3 MG/DL — LOW (ref 2.5–4.5)
PHOSPHATE SERPL-MCNC: 2.9 MG/DL — SIGNIFICANT CHANGE UP (ref 2.5–4.5)
PLATELET # BLD AUTO: 136 K/UL — LOW (ref 150–400)
POTASSIUM SERPL-MCNC: 2.9 MMOL/L — CRITICAL LOW (ref 3.5–5.3)
POTASSIUM SERPL-MCNC: 3.4 MMOL/L — LOW (ref 3.5–5.3)
POTASSIUM SERPL-SCNC: 2.9 MMOL/L — CRITICAL LOW (ref 3.5–5.3)
POTASSIUM SERPL-SCNC: 3.4 MMOL/L — LOW (ref 3.5–5.3)
PROTHROM AB SERPL-ACNC: 12.4 SEC — SIGNIFICANT CHANGE UP (ref 10.6–13.6)
RBC # BLD: 4.09 M/UL — SIGNIFICANT CHANGE UP (ref 3.8–5.2)
RBC # FLD: 13.9 % — SIGNIFICANT CHANGE UP (ref 10.3–14.5)
SODIUM SERPL-SCNC: 142 MMOL/L — SIGNIFICANT CHANGE UP (ref 135–145)
SODIUM SERPL-SCNC: 144 MMOL/L — SIGNIFICANT CHANGE UP (ref 135–145)
SPECIMEN SOURCE: SIGNIFICANT CHANGE UP
TOTAL CHOLESTEROL/HDL RATIO MEASUREMENT: 2.2 RATIO — LOW (ref 3.3–7.1)
TRIGL SERPL-MCNC: 105 MG/DL — SIGNIFICANT CHANGE UP (ref 10–149)
WBC # BLD: 11.41 K/UL — HIGH (ref 3.8–10.5)
WBC # FLD AUTO: 11.41 K/UL — HIGH (ref 3.8–10.5)

## 2020-08-28 PROCEDURE — 99291 CRITICAL CARE FIRST HOUR: CPT | Mod: 25

## 2020-08-28 PROCEDURE — 95720 EEG PHY/QHP EA INCR W/VEEG: CPT

## 2020-08-28 PROCEDURE — 99232 SBSQ HOSP IP/OBS MODERATE 35: CPT | Mod: GC

## 2020-08-28 PROCEDURE — 99233 SBSQ HOSP IP/OBS HIGH 50: CPT

## 2020-08-28 RX ORDER — INSULIN LISPRO 100/ML
VIAL (ML) SUBCUTANEOUS EVERY 6 HOURS
Refills: 0 | Status: DISCONTINUED | OUTPATIENT
Start: 2020-08-28 | End: 2020-08-30

## 2020-08-28 RX ORDER — LATANOPROST 0.05 MG/ML
1 SOLUTION/ DROPS OPHTHALMIC; TOPICAL AT BEDTIME
Refills: 0 | Status: DISCONTINUED | OUTPATIENT
Start: 2020-08-28 | End: 2020-09-02

## 2020-08-28 RX ORDER — POTASSIUM CHLORIDE 20 MEQ
40 PACKET (EA) ORAL ONCE
Refills: 0 | Status: COMPLETED | OUTPATIENT
Start: 2020-08-28 | End: 2020-08-28

## 2020-08-28 RX ORDER — POLYETHYLENE GLYCOL 3350 17 G/17G
17 POWDER, FOR SOLUTION ORAL DAILY
Refills: 0 | Status: DISCONTINUED | OUTPATIENT
Start: 2020-08-28 | End: 2020-08-29

## 2020-08-28 RX ORDER — LANOLIN ALCOHOL/MO/W.PET/CERES
3 CREAM (GRAM) TOPICAL AT BEDTIME
Refills: 0 | Status: DISCONTINUED | OUTPATIENT
Start: 2020-08-28 | End: 2020-08-29

## 2020-08-28 RX ORDER — INSULIN GLARGINE 100 [IU]/ML
32 INJECTION, SOLUTION SUBCUTANEOUS AT BEDTIME
Refills: 0 | Status: DISCONTINUED | OUTPATIENT
Start: 2020-08-28 | End: 2020-08-29

## 2020-08-28 RX ORDER — POTASSIUM PHOSPHATE, MONOBASIC POTASSIUM PHOSPHATE, DIBASIC 236; 224 MG/ML; MG/ML
15 INJECTION, SOLUTION INTRAVENOUS ONCE
Refills: 0 | Status: COMPLETED | OUTPATIENT
Start: 2020-08-28 | End: 2020-08-28

## 2020-08-28 RX ORDER — NICARDIPINE HYDROCHLORIDE 30 MG/1
20 CAPSULE, EXTENDED RELEASE ORAL ONCE
Refills: 0 | Status: COMPLETED | OUTPATIENT
Start: 2020-08-28 | End: 2020-08-28

## 2020-08-28 RX ORDER — NICARDIPINE HYDROCHLORIDE 30 MG/1
40 CAPSULE, EXTENDED RELEASE ORAL THREE TIMES A DAY
Refills: 0 | Status: DISCONTINUED | OUTPATIENT
Start: 2020-08-28 | End: 2020-08-29

## 2020-08-28 RX ORDER — ALLOPURINOL 300 MG
100 TABLET ORAL EVERY 12 HOURS
Refills: 0 | Status: DISCONTINUED | OUTPATIENT
Start: 2020-08-28 | End: 2020-08-31

## 2020-08-28 RX ORDER — ASPIRIN/CALCIUM CARB/MAGNESIUM 324 MG
81 TABLET ORAL DAILY
Refills: 0 | Status: DISCONTINUED | OUTPATIENT
Start: 2020-08-28 | End: 2020-08-31

## 2020-08-28 RX ORDER — DEXMEDETOMIDINE HYDROCHLORIDE IN 0.9% SODIUM CHLORIDE 4 UG/ML
0.2 INJECTION INTRAVENOUS
Qty: 200 | Refills: 0 | Status: DISCONTINUED | OUTPATIENT
Start: 2020-08-28 | End: 2020-08-29

## 2020-08-28 RX ORDER — HALOPERIDOL DECANOATE 100 MG/ML
2.5 INJECTION INTRAMUSCULAR ONCE
Refills: 0 | Status: COMPLETED | OUTPATIENT
Start: 2020-08-28 | End: 2020-08-28

## 2020-08-28 RX ORDER — POTASSIUM CHLORIDE 20 MEQ
20 PACKET (EA) ORAL ONCE
Refills: 0 | Status: COMPLETED | OUTPATIENT
Start: 2020-08-28 | End: 2020-08-28

## 2020-08-28 RX ORDER — CEFTRIAXONE 500 MG/1
1000 INJECTION, POWDER, FOR SOLUTION INTRAMUSCULAR; INTRAVENOUS EVERY 24 HOURS
Refills: 0 | Status: DISCONTINUED | OUTPATIENT
Start: 2020-08-29 | End: 2020-08-29

## 2020-08-28 RX ORDER — BRIMONIDINE TARTRATE 2 MG/MG
1 SOLUTION/ DROPS OPHTHALMIC
Refills: 0 | Status: DISCONTINUED | OUTPATIENT
Start: 2020-08-28 | End: 2020-09-02

## 2020-08-28 RX ADMIN — Medication 100 MILLIGRAM(S): at 09:17

## 2020-08-28 RX ADMIN — NICARDIPINE HYDROCHLORIDE 12.5 MG/HR: 30 CAPSULE, EXTENDED RELEASE ORAL at 06:00

## 2020-08-28 RX ADMIN — CHLORHEXIDINE GLUCONATE 1 APPLICATION(S): 213 SOLUTION TOPICAL at 05:30

## 2020-08-28 RX ADMIN — Medication 3 MILLIGRAM(S): at 21:05

## 2020-08-28 RX ADMIN — Medication 2: at 17:26

## 2020-08-28 RX ADMIN — LEVETIRACETAM 400 MILLIGRAM(S): 250 TABLET, FILM COATED ORAL at 17:25

## 2020-08-28 RX ADMIN — ATORVASTATIN CALCIUM 80 MILLIGRAM(S): 80 TABLET, FILM COATED ORAL at 21:05

## 2020-08-28 RX ADMIN — POTASSIUM PHOSPHATE, MONOBASIC POTASSIUM PHOSPHATE, DIBASIC 62.5 MILLIMOLE(S): 236; 224 INJECTION, SOLUTION INTRAVENOUS at 07:40

## 2020-08-28 RX ADMIN — LEVETIRACETAM 400 MILLIGRAM(S): 250 TABLET, FILM COATED ORAL at 05:30

## 2020-08-28 RX ADMIN — DEXMEDETOMIDINE HYDROCHLORIDE IN 0.9% SODIUM CHLORIDE 6.35 MICROGRAM(S)/KG/HR: 4 INJECTION INTRAVENOUS at 17:32

## 2020-08-28 RX ADMIN — NICARDIPINE HYDROCHLORIDE 20 MILLIGRAM(S): 30 CAPSULE, EXTENDED RELEASE ORAL at 05:20

## 2020-08-28 RX ADMIN — ENOXAPARIN SODIUM 40 MILLIGRAM(S): 100 INJECTION SUBCUTANEOUS at 17:25

## 2020-08-28 RX ADMIN — Medication 2: at 06:05

## 2020-08-28 RX ADMIN — Medication 50 MILLIEQUIVALENT(S): at 06:50

## 2020-08-28 RX ADMIN — CEFTRIAXONE 100 MILLIGRAM(S): 500 INJECTION, POWDER, FOR SOLUTION INTRAMUSCULAR; INTRAVENOUS at 05:00

## 2020-08-28 RX ADMIN — NICARDIPINE HYDROCHLORIDE 12.5 MG/HR: 30 CAPSULE, EXTENDED RELEASE ORAL at 17:33

## 2020-08-28 RX ADMIN — BRIMONIDINE TARTRATE 1 DROP(S): 2 SOLUTION/ DROPS OPHTHALMIC at 17:25

## 2020-08-28 RX ADMIN — LATANOPROST 1 DROP(S): 0.05 SOLUTION/ DROPS OPHTHALMIC; TOPICAL at 21:05

## 2020-08-28 RX ADMIN — PANTOPRAZOLE SODIUM 40 MILLIGRAM(S): 20 TABLET, DELAYED RELEASE ORAL at 11:37

## 2020-08-28 RX ADMIN — Medication 3: at 23:30

## 2020-08-28 RX ADMIN — ENOXAPARIN SODIUM 40 MILLIGRAM(S): 100 INJECTION SUBCUTANEOUS at 06:00

## 2020-08-28 RX ADMIN — Medication 81 MILLIGRAM(S): at 11:37

## 2020-08-28 RX ADMIN — NICARDIPINE HYDROCHLORIDE 20 MILLIGRAM(S): 30 CAPSULE, EXTENDED RELEASE ORAL at 09:17

## 2020-08-28 RX ADMIN — Medication 40 MILLIEQUIVALENT(S): at 06:50

## 2020-08-28 RX ADMIN — Medication 100 MILLIGRAM(S): at 21:05

## 2020-08-28 RX ADMIN — HALOPERIDOL DECANOATE 2.5 MILLIGRAM(S): 100 INJECTION INTRAMUSCULAR at 16:38

## 2020-08-28 RX ADMIN — Medication 4: at 11:38

## 2020-08-28 RX ADMIN — Medication 40 MILLIEQUIVALENT(S): at 13:38

## 2020-08-28 RX ADMIN — SODIUM CHLORIDE 100 MILLILITER(S): 9 INJECTION, SOLUTION INTRAVENOUS at 04:00

## 2020-08-28 RX ADMIN — NICARDIPINE HYDROCHLORIDE 40 MILLIGRAM(S): 30 CAPSULE, EXTENDED RELEASE ORAL at 21:05

## 2020-08-28 RX ADMIN — DEXMEDETOMIDINE HYDROCHLORIDE IN 0.9% SODIUM CHLORIDE 6.35 MICROGRAM(S)/KG/HR: 4 INJECTION INTRAVENOUS at 20:30

## 2020-08-28 RX ADMIN — INSULIN GLARGINE 32 UNIT(S): 100 INJECTION, SOLUTION SUBCUTANEOUS at 23:19

## 2020-08-28 RX ADMIN — POLYETHYLENE GLYCOL 3350 17 GRAM(S): 17 POWDER, FOR SOLUTION ORAL at 11:37

## 2020-08-28 RX ADMIN — NICARDIPINE HYDROCHLORIDE 40 MILLIGRAM(S): 30 CAPSULE, EXTENDED RELEASE ORAL at 13:39

## 2020-08-28 RX ADMIN — Medication 4: at 23:19

## 2020-08-28 NOTE — DIETITIAN INITIAL EVALUATION ADULT. - PERTINENT MEDS FT
MEDICATIONS  (STANDING):  allopurinol 100 milliGRAM(s) Oral every 12 hours  aspirin  chewable 81 milliGRAM(s) Oral daily  atorvastatin 80 milliGRAM(s) Oral at bedtime  cefTRIAXone   IVPB 1000 milliGRAM(s) IV Intermittent every 24 hours  chlorhexidine 4% Liquid 1 Application(s) Topical <User Schedule>  dexMEDEtomidine Infusion 0.2 MICROgram(s)/kG/Hr (6.35 mL/Hr) IV Continuous <Continuous>  dextrose 5%. 1000 milliLiter(s) (50 mL/Hr) IV Continuous <Continuous>  dextrose 50% Injectable 12.5 Gram(s) IV Push once  dextrose 50% Injectable 25 Gram(s) IV Push once  dextrose 50% Injectable 25 Gram(s) IV Push once  enoxaparin Injectable 40 milliGRAM(s) SubCutaneous every 12 hours  insulin glargine Injectable (LANTUS) 28 Unit(s) SubCutaneous at bedtime  insulin lispro (HumaLOG) corrective regimen sliding scale   SubCutaneous every 6 hours  lactated ringers. 1000 milliLiter(s) (100 mL/Hr) IV Continuous <Continuous>  levETIRAcetam  IVPB 1000 milliGRAM(s) IV Intermittent every 12 hours  niCARdipine 20 milliGRAM(s) Oral three times a day  niCARdipine Infusion 2.5 mG/Hr (12.5 mL/Hr) IV Continuous <Continuous>  pantoprazole  Injectable 40 milliGRAM(s) IV Push daily  polyethylene glycol 3350 17 Gram(s) Oral daily

## 2020-08-28 NOTE — DIETITIAN INITIAL EVALUATION ADULT. - ENTERAL
If pt requires EN, recommend Vital 1.2 80cc/hr x 18 hrs provides 1728 kcals, 108 gm protein, 1168cc free water  meets 13 Kcal/Kg dosing wt 127 kg, 2 Gm protein/kg IBW 54.5 kg

## 2020-08-28 NOTE — CHART NOTE - NSCHARTNOTEFT_GEN_A_CORE
Upon Nutritional Assessment by the Registered Dietitian your patient was determined to meet criteria / has evidence of the following diagnosis/diagnoses:          [ ]  Mild Protein Calorie Malnutrition        [ ]  Moderate Protein Calorie Malnutrition        [ ] Severe Protein Calorie Malnutrition        [ ] Unspecified Protein Calorie Malnutrition        [ ] Underweight / BMI <19        [ x] Morbid Obesity / BMI > 40      Findings as based on:  [x ] Comprehensive nutrition assessment   [ ] Nutrition Focused Physical Exam  [ ] Other:       Nutrition Plan/Recommendations:  see Initial Nutrition Assessment        PROVIDER Section:     By signing this assessment you are acknowledging and agree with the diagnosis/diagnoses assigned by the Registered Dietitian    Comments:

## 2020-08-28 NOTE — EEG REPORT - NS EEG TEXT BOX
[facility]   COMPREHENSIVE EPILEPSY CENTER   REPORT OF LONG-TERM VIDEO EEG     [facility]: [address], [place] [address], Ph#: [phone]  [facility]: [address] [place] [address], Ph#: [phone]  [facility]: [address] [place] [address], Ph#: [phone]    Patient Name: ANA M JOHNSON  Age and : 70y (48)  MRN #: [id number]  Location: [facility] 514 W1  Referring Physician: Maxwell Pappas    Start Time/Date: 08:00 on 20  End Time/Date: 19:15 on 20  Duration: 13 hours 20 minutes      _____________________________________________________________  STUDY INFORMATION    EEG Recording Technique:  The patient underwent continuous Video-EEG monitoring, using Telemetry System hardware on the XLTek Digital System. EEG and video data were stored on a computer hard drive with important events saved in digital archive files. The material was reviewed by a physician (electroencephalographer / epileptologist) on a daily basis. Jamil and seizure detection algorithms were utilized and reviewed. An EEG Technician attended to the patient, and was available throughout daytime work hours.  The epilepsy center neurologist was available in person or on call 24-hours per day.    EEG Placement and Labeling of Electrodes:  The EEG was performed utilizing 20 channel referential EEG connections (coronal over temporal over parasagittal montage) using all standard 10-20 electrode placements with EKG, with additional electrodes placed in the inferior temporal region using the modified 10-10 montage electrode placements for elective admissions, or if deemed necessary. Recording was at a sampling rate of 256 samples per second per channel. Time synchronized digital video recording was done simultaneously with EEG recording. A low light infrared camera was used for low light recording.     _____________________________________________________________  HISTORY    Patient is a 70y old  Female who presents with a chief complaint of HHS, Seizure (27 Aug 2020 11:25)      PERTINENT MEDICATION:  levETIRAcetam  IVPB 1000 milliGRAM(s) IV Intermittent every 12 hours    _____________________________________________________________  STUDY INTERPRETATION    Findings: The background was continuous, spontaneously variable and reactive. No posterior dominant rhythm seen.    Background Slowing:  Diffuse theta and polymorphic delta slowing.    Focal Slowing:   None were present.    Sleep Background:  Drowsiness was characterized by fragmentation, attenuation, and slowing of the background activity.    Stage II sleep transients were not recorded.    Other Findings:  Occular flutter (rapid eye blinking), no epileptiform discharge noted     Events:  Clinical events: None recorded.  Seizures: None recorded.    Activation Procedures:   Hyperventilation was not performed.    Photic stimulation was not performed.     Artifacts:  Intermittent myogenic and movement artifacts were noted.    ECG:  The heart rate on single channel ECG was predominantly between 70-80 BPM.    _____________________________________________________________  EEG SUMMARY/CLASSIFICATION    Abnormal EEG in an altered patient.  - Moderate to severe generalized slowing  _____________________________________________________________  EEG IMPRESSION/CLINICAL CORRELATE    Abnormal EEG study.  1. Moderate to severe nonspecific diffuse or multifocal cerebral dysfunction.   2. No seizure seen.    _____________________________    [facility]   COMPREHENSIVE EPILEPSY CENTER   REPORT OF LONG-TERM VIDEO EEG     [facility]: [address], [place] [address], Ph#: [phone]  [facility]: [address] [place] [address], Ph#: [phone]  [facility]: [address] [place] [address], Ph#: [phone]    Patient Name: ANA M JOHNSON  Age and : 70y (48)  MRN #: [id number]  Location: [facility] W1  Referring Physician: Maxwell Pappas    Start Time/Date: 08:00 on 20  End Time/Date: 08:00 on 20  Off EEG from 15:55 to 02:15, total duration of recording: >13hr    _____________________________________________________________  STUDY INFORMATION    EEG Recording Technique:  The patient underwent continuous Video-EEG monitoring, using Telemetry System hardware on the XLTek Digital System. EEG and video data were stored on a computer hard drive with important events saved in digital archive files. The material was reviewed by a physician (electroencephalographer / epileptologist) on a daily basis. Jamil and seizure detection algorithms were utilized and reviewed. An EEG Technician attended to the patient, and was available throughout daytime work hours.  The epilepsy center neurologist was available in person or on call 24-hours per day.    EEG Placement and Labeling of Electrodes:  The EEG was performed utilizing 20 channel referential EEG connections (coronal over temporal over parasagittal montage) using all standard 10-20 electrode placements with EKG, with additional electrodes placed in the inferior temporal region using the modified 10-10 montage electrode placements for elective admissions, or if deemed necessary. Recording was at a sampling rate of 256 samples per second per channel. Time synchronized digital video recording was done simultaneously with EEG recording. A low light infrared camera was used for low light recording.     _____________________________________________________________  HISTORY    Patient is a 70y old  Female who presents with a chief complaint of HHS, Seizure (27 Aug 2020 11:25)      PERTINENT MEDICATION:  levETIRAcetam  IVPB 1000 milliGRAM(s) IV Intermittent every 12 hours    _____________________________________________________________  STUDY INTERPRETATION    Findings: The background was continuous, spontaneously variable and reactive. During wakefulness, the posterior dominant rhythm consisted of fragments of 7 Hz activity, with amplitude to 30 uV, that attenuated to eye opening.     Background Slowing:  Diffuse theta and polymorphic delta slowing.    Focal Slowing:   None were present.    Sleep Background:  Drowsiness was characterized by fragmentation, attenuation, and slowing of the background activity.    Stage II sleep transients were not recorded.    Other Non-Epileptiform Findings:  None were present.    Interictal Epileptiform Activity:  None were present.    Events:  Clinical events: None recorded.  Seizures: None recorded.    Activation Procedures:   Hyperventilation was not performed.    Photic stimulation was not performed.     Artifacts:  Intermittent myogenic and movement artifacts were noted. Prominent eye fluttering seen.    ECG:  The heart rate on single channel ECG was predominantly between 70-80 BPM.    _____________________________________________________________  EEG SUMMARY/CLASSIFICATION    Abnormal EEG in an altered patient.  - Mild to moderate generalized slowing    _____________________________________________________________  EEG IMPRESSION/CLINICAL CORRELATE    Abnormal EEG study.  1. Mild to moderate  nonspecific diffuse or multifocal cerebral dysfunction.   2. No epileptiform pattern or seizure seen.    _____________________________________________________________    Tomasz Ford MD  Epilepsy Fellow    Nunu Deleon MD  Attending Physician, [facility]

## 2020-08-28 NOTE — PROGRESS NOTE ADULT - ASSESSMENT
Assessment: 69 yo female with PMHx of HTN, BERKLEY, GERD, Giant cell Arthritis, Gout presents to [facility] 8/25 with c/o dysuria/polyuria/polydipsia, RUE tremors/dystonia x about 1 week found to be in hypertensive urgency to SBP 200s and Hyperglycemic Hyperosmolar State () vs less likely DKA and Lactic Acidosis accepted to MICU for further management c/b generalized seizure/AMS of unclear etiology, most likely d/t rapid correction of blood sugar and osmolar shift requiring intubation for airway protection.    Plan:    #Neuro: Generalized seizure/Encephalopathy of unclear etiology? likely d/t rapid correction of blood sugar with osmolar shift  - no h/o seizure like activity as per family, no new medications/toxins, signs of infection or other inducing factors evident at this time  - on Cyclobenzaprine 10mg q8 and Hydroxyzine Hydrochloride 20 mg qd at home  - s/p Ativan 4mg IV x1, Started on Propofol gtt and Versed gtt with clinical improvement of seizure like activity  - versed gtt d/c'd 8/26  - s/p Keppra IV load, continued on 1g q12 for now - will f/u with Neuro regarding possibly decreasing dosage as pt is now delirious and no signs of seizure like activity  - remained on propofol gtt until extubation 8/27 AM  - vEEG (8/28) reading intermittent sharply contoured waveforms in the b/l frontocentral region of unclear significance, moderate to severe nonspecific diffuse or multifocal cerebral dysfunction, no seizure seen  - CTH with possibly meningioma on admission, f/u MRI vs r/p CTH for repeat imaging if no significant improvement in metal status   - f/u Neurology recommendations   - continue ASA 81 and high dose statin for now  - continue neuro checks as per protocol  - continue seizure precautions   - s/p extubation 8/27, AAOx1, agitated and restless, intermittently following commands and answering some questions yes/no, still requiring small dose of precedex gtt at this time    #Pulm: Acute hypoxemic Respiratory Failure s/p intubation for airway protection i/s/o AMS/generalized seizure activity   - successfully extubated to aerosol mask 8/27 AM, ABG s/p extubation 7.49/42/94/32/98%  - ABG PRN   - now weaned to 3L nasal cannula  - continue to wean FiO2 as tolerated for SpO2 >90%  - aspiration precautions    #CV: Hypertensive urgency () s/p total Labetalol 40 IV on admission   - on lisinopril at home   - started on Nicardipine gtt for a goal of SBP 140s-160s, still remains on at this time  - started on Nicardipine 20 q8 8/27 PM, will uptitrate and add PO agents as tolerated to wean off gtt  - f/u TTE    #GI/: No active issues  - dysuria/polydipsia i/s/o HHS vs DKA  - NPO status, NGT placed however unable to feed at this time d/t risk of aspiration   - consider speech/swallow extubation as mentation improves   - LR IVF @ 100/hr   - PPI qdaily while NPO    #ID: Afebrile, no leukocytosis at presentation   - started on Ceftriaxone 2g qd for empiric meningitis coverage on admission   - de-escalated to Ceftriaxone 1g qd  for empiric aspiration PNA coverage and f/u culture results (Day #3 of Abx today 8/28)  - blood culture negative 8/26, sputum culture normal respiratory bernie  - WBC 8.96 --> 13.35; will continue to monitor; remains afebrile     #FEN/ENDO: Hyperglycemic Hyperosmolar State vs DKA  - p/w , AG 22, HCO3 21, BHB 2.3, pH 7.44  - p/w  corrected to 562 about 2.5 hrs s/p initiation of insulin gtt  - transitioned off insulin gtt at 5:30 pm 8/27 s/p 5 U NPH at 3:30. NPH d/c'd as pt NPO and started on Lantus 28 qhs as per endocrinology reccs  - FS q6 hrs with low correction sliding scale q6 hrs   - Endocrine consult/ f.u reccs   - HbA1C 9.0; new dx of T2DM  - will f/u CATY Ab, Islet cell Ab, insulin Ab  Lactic Acidosis to 9.6, pH 7.34  - now improved    #HEME:  No active issues  - DVT ppx with Lovenox 40q12 Assessment: 71 yo female with PMHx of HTN, BERKLEY, GERD, Giant cell Arthritis, Gout presents to [facility] 8/25 with c/o dysuria/polyuria/polydipsia, RUE tremors/dystonia x about 1 week found to be in hypertensive urgency to SBP 200s and Hyperglycemic Hyperosmolar State () vs less likely DKA and Lactic Acidosis accepted to MICU for further management c/b generalized seizure/AMS of unclear etiology, most likely d/t rapid correction of blood sugar and osmolar shift requiring intubation for airway protection.    Plan:    #Neuro: Generalized seizure/Encephalopathy of unclear etiology? likely d/t rapid correction of blood sugar with osmolar shift  - no h/o seizure like activity as per family, no new medications/toxins, signs of infection or other inducing factors evident at this time  - on Cyclobenzaprine 10mg q8 and Hydroxyzine Hydrochloride 20 mg qd at home  - s/p Ativan 4mg IV x1, Started on Propofol gtt and Versed gtt with clinical improvement of seizure like activity  - versed gtt d/c'd 8/26  - s/p Keppra IV load, continued on 1g q12 for now - will f/u with Neuro regarding possibly decreasing dosage as pt is now delirious and no signs of seizure like activity  - remained on propofol gtt until extubation 8/27 AM  - vEEG (8/28) reading intermittent sharply contoured waveforms in the b/l frontocentral region of unclear significance, moderate to severe nonspecific diffuse or multifocal cerebral dysfunction, no seizure seen, f/u read  - CTH with possibly meningioma on admission, consider f/u MRI vs r/p CTH for repeat imaging if no significant improvement in metal status   - f/u Neurology recommendations   - continue ASA 81 and high dose statin for now  - continue neuro checks as per protocol  - continue seizure precautions   - s/p extubation 8/27, AAOx1, agitated and restless, intermittently following commands and answering some questions yes/no, still requiring small dose of precedex gtt at this time, will attempt to d/c today    #Pulm: Acute hypoxemic Respiratory Failure s/p intubation for airway protection i/s/o AMS/generalized seizure activity   - successfully extubated to aerosol mask 8/27 AM, ABG s/p extubation 7.49/42/94/32/98%  - ABG PRN   - now weaned to 3L nasal cannula  - continue to wean FiO2 as tolerated for SpO2 >90%  - aspiration precautions    #CV: Hypertensive urgency () s/p total Labetalol 40 IV on admission   - on lisinopril at home   - started on Nicardipine gtt for a goal of SBP 140s-160s, still remains on at this time  - started on Nicardipine 20 q8 8/27 PM, will uptitrate to 40 mg q8 to wean off gtt  - f/u TTE    #GI/: No active issues  - dysuria/polydipsia i/s/o HHS vs DKA  - NPO status, NGT placed however unable to feed at this time d/t risk of aspiration   - consider speech/swallow extubation as mentation improves   - will d/c IVF as pt becoming net negative   - PPI qdaily    #ID: Afebrile, no leukocytosis at presentation   - started on Ceftriaxone 2g qd for empiric meningitis coverage on admission   - de-escalated to Ceftriaxone 1g qd  for empiric aspiration PNA coverage and f/u culture results (Day #3 of Abx today 8/28)  - blood culture negative 8/26, sputum culture normal respiratory bernie  - will continue Abx for a 5 day course (last day 8/30)  - WBC 8.96 --> 13.35 --> 11.41; will continue to monitor; remains afebrile     #FEN/ENDO: Hyperglycemic Hyperosmolar State vs DKA  - p/w , AG 22, HCO3 21, BHB 2.3, pH 7.44  - p/w  corrected to 562 about 2.5 hrs s/p initiation of insulin gtt  - transitioned off insulin gtt at 5:30 pm 8/27 s/p 5 U NPH at 3:30. NPH d/c'd as pt NPO and started on Lantus 28 qhs as per endocrinology reccs  - FS q6 hrs with low correction sliding scale q6 hrs; will increase to mod dose ISS  - Endocrine consult/ f.u reccs   - HbA1C 9.0; new dx of T2DM  - will f/u CATY Ab, Islet cell Ab, insulin Ab  Lactic Acidosis to 9.6, pH 7.34  - now improved    #HEME:  No active issues  - DVT ppx with Lovenox 40q12 Assessment: 71 yo female with PMHx of HTN, BERKLEY, GERD, Giant cell Arthritis, Gout presents to [facility] 8/25 with c/o dysuria/polyuria/polydipsia, RUE tremors/dystonia x about 1 week found to be in hypertensive urgency to SBP 200s and Hyperglycemic Hyperosmolar State () vs less likely DKA and Lactic Acidosis accepted to MICU for further management c/b generalized seizure/AMS of unclear etiology, most likely d/t rapid correction of blood sugar and osmolar shift requiring intubation for airway protection.    Plan:    #Neuro: Generalized seizure/Encephalopathy of unclear etiology? likely d/t rapid correction of blood sugar with osmolar shift  - no h/o seizure like activity as per family, no new medications/toxins, signs of infection or other inducing factors evident at this time  - on Cyclobenzaprine 10mg q8 and Hydroxyzine Hydrochloride 20 mg qd at home  - s/p Ativan 4mg IV x1, Started on Propofol gtt and Versed gtt with clinical improvement of seizure like activity  - versed gtt d/c'd 8/26  - s/p Keppra IV load, continued on 1g q12 for now - will f/u with Neuro regarding possibly decreasing dosage as pt is now delirious and no signs of seizure like activity  - remained on propofol gtt until extubation 8/27 AM  - vEEG (8/28) reading intermittent sharply contoured waveforms in the b/l frontocentral region of unclear significance, moderate to severe nonspecific diffuse or multifocal cerebral dysfunction, no seizure seen, maintain for another 24 hrs as per Neuro and f/u read  - CTH with possibly meningioma on admission, f/u MRI for repeat imaging when able  - f/u Neurology recommendations   - continue ASA 81 and high dose statin for now  - continue neuro checks as per protocol  - continue seizure precautions   - s/p extubation 8/27, AAOx1, agitated and restless, intermittently following commands and answering some questions yes/no, still requiring small dose of precedex gtt at this time, will attempt to d/c today    #Pulm: Acute hypoxemic Respiratory Failure s/p intubation for airway protection i/s/o AMS/generalized seizure activity   - successfully extubated to aerosol mask 8/27 AM, ABG s/p extubation 7.49/42/94/32/98%  - ABG PRN   - now weaned to 3L nasal cannula  - continue to wean FiO2 as tolerated for SpO2 >90%  - aspiration precautions    #CV: Hypertensive urgency () s/p total Labetalol 40 IV on admission   - on lisinopril at home   - started on Nicardipine gtt for a goal of SBP 140s-160s, still remains on at this time  - started on Nicardipine 20 q8 8/27 PM, will uptitrate to 40 mg q8 to wean off gtt  - f/u TTE    #GI/: No active issues  - dysuria/polydipsia i/s/o HHS vs DKA  - NPO status, NGT placed however unable to feed at this time d/t risk of aspiration   - consider speech/swallow extubation as mentation improves   - will d/c IVF as pt becoming net negative   - PPI qdaily    #ID: Afebrile, no leukocytosis at presentation   - started on Ceftriaxone 2g qd for empiric meningitis coverage on admission   - de-escalated to Ceftriaxone 1g qd  for empiric aspiration PNA coverage and f/u culture results (Day #3 of Abx today 8/28)  - blood culture negative 8/26, sputum culture normal respiratory bernie  - will continue Abx for a 5 day course (last day 8/30)  - WBC 8.96 --> 13.35 --> 11.41; will continue to monitor; remains afebrile     #FEN/ENDO: Hyperglycemic Hyperosmolar State vs DKA  - p/w , AG 22, HCO3 21, BHB 2.3, pH 7.44  - p/w  corrected to 562 about 2.5 hrs s/p initiation of insulin gtt  - transitioned off insulin gtt at 5:30 pm 8/27 s/p 5 U NPH at 3:30. NPH d/c'd as pt NPO and started on Lantus 28 qhs as per endocrinology reccs  - FS q6 hrs with low correction sliding scale q6 hrs; will increase to mod dose ISS  - Endocrine consult/ f.u reccs   - HbA1C 9.0; new dx of T2DM  - will f/u CATY Ab, Islet cell Ab, insulin Ab  Lactic Acidosis to 9.6, pH 7.34  - now improved    #HEME:  No active issues  - DVT ppx with Lovenox 40q12

## 2020-08-28 NOTE — PROGRESS NOTE ADULT - ASSESSMENT
69 y/o F with history of HTN, Gout, obesity, presenting w/ b/l ankle swelling, fatigue/sleepiness and generalized weakness x several days. Found to have serum BG of 796, AG 22, bicarb 21, BHB2.3, lactate 9, WBC 10.88. UA negative for UTI. CXR showed clear lungs. CTH showing possible meningioma. Given 2L IVF bolus and  admitted to MICU where she was started on insulin drip and IVF. Dextrose added to IVF at ~4pm on 8/26 and stopped early this morning 8/72. CCB seizure while in MICU possibly due to rapid correction of BG, intubated for airway protection. Endocrinology consulted for concern for DKA/HHS in the setting of newly diagnosed DM (high risk patient with DKA with seizure with high level decision-making).        Problem/Recommendation - 1:  Problem: Type 2 diabetes mellitus with hyperglycemia, without long-term current use of insulin. Recommendation: Newly diagnosed diabetes with HbA1C 9.0 presenting with DKA, suspect ketosis prone type 2 given obesity. However, given acute development of marked hyperglycemia and diabetes, other possible etiologies include DENTON and pancreatic cancer. DKA now resolved and pt transitioned to basal insulin with Lantus 28 units qhs. Remains NPO due to aspiration risk per team.  - increase Lantus to 32 units qhs  - pt is currently NPO with no plan for diet or TFs at this time. Please notify endocrine team if starting diet or TFs as this may change insulin regimen.  - continue low correction scale q6h  - check FS q6h   - check CATY antibody, Islet cell antibody, insulin antibody to r/o DENTON  - consider abdominal imaging to r/o pancreatic cancer   Discharge:  - Plan TBD based on further data.     Problem/Recommendation - 2:  ·  Problem: Diabetic ketoacidosis without coma associated with type 2 diabetes mellitus.  Recommendation: management as above.      Problem/Recommendation - 3:  ·  Problem: Hypertension, unspecified type.  Recommendation: - goal BP <130/80  - management per primary team      Lizbeth Wells DO, Endocrine Fellow   Pager [phone]. from 9-5PM. After hours and on weekends please call [phone].

## 2020-08-28 NOTE — DIETITIAN INITIAL EVALUATION ADULT. - ENERGY NEEDS
Ht: 64"  Wt: 279  BMI: 48.7  kg/m2   IBW: 120 (+/-10%)     233% IBW  Edema: 1+ generalized         Skin: no pressure injuries documented

## 2020-08-28 NOTE — PROGRESS NOTE ADULT - SUBJECTIVE AND OBJECTIVE BOX
CHIEF COMPLAINT:  Patient is a 70y old  Female who presents with a chief complaint of HHS, Seizure (27 Aug 2020 15:43)      Interval Events:    REVIEW OF SYSTEMS:      OBJECTIVE:  ICU Vital Signs Last 24 Hrs  T(C): 36 (28 Aug 2020 03:30), Max: 36.9 (27 Aug 2020 16:00)  T(F): 96.8 (28 Aug 2020 03:30), Max: 98.4 (27 Aug 2020 16:00)  HR: 106 (28 Aug 2020 05:15) (56 - 113)  BP: 163/72 (28 Aug 2020 05:15) (108/57 - 198/106)  BP(mean): 104 (28 Aug 2020 05:15) (76 - 142)  ABP: --  ABP(mean): --  RR: 27 (28 Aug 2020 05:15) (14 - 38)  SpO2: 98% (28 Aug 2020 05:15) (85% - 100%)    Mode: CPAP with PS, FiO2: 40, PEEP: 5, PS: 5, MAP: 8, PIP: 16    08-26 @ 07:01 - 08-27 @ 07:00  --------------------------------------------------------  IN: 6037.9 mL / OUT: 4290 mL / NET: 1747.9 mL    08-27 @ 07:01  -  08-28 @ 05:32  --------------------------------------------------------  IN: 4745.5 mL / OUT: 1475 mL / NET: 3270.5 mL      CAPILLARY BLOOD GLUCOSE  256 (27 Aug 2020 23:15)      POCT Blood Glucose.: 160 mg/dL (27 Aug 2020 17:57)        HOSPITAL MEDICATIONS:  MEDICATIONS  (STANDING):  aspirin Suppository 300 milliGRAM(s) Rectal daily  atorvastatin 80 milliGRAM(s) Oral at bedtime  cefTRIAXone   IVPB 1000 milliGRAM(s) IV Intermittent every 24 hours  chlorhexidine 4% Liquid 1 Application(s) Topical <User Schedule>  dexMEDEtomidine Infusion 0.2 MICROgram(s)/kG/Hr (6.35 mL/Hr) IV Continuous <Continuous>  dextrose 5%. 1000 milliLiter(s) (50 mL/Hr) IV Continuous <Continuous>  dextrose 50% Injectable 12.5 Gram(s) IV Push once  dextrose 50% Injectable 25 Gram(s) IV Push once  dextrose 50% Injectable 25 Gram(s) IV Push once  enoxaparin Injectable 40 milliGRAM(s) SubCutaneous every 12 hours  insulin glargine Injectable (LANTUS) 28 Unit(s) SubCutaneous at bedtime  insulin lispro (HumaLOG) corrective regimen sliding scale   SubCutaneous every 6 hours  lactated ringers. 1000 milliLiter(s) (100 mL/Hr) IV Continuous <Continuous>  levETIRAcetam  IVPB 1000 milliGRAM(s) IV Intermittent every 12 hours  niCARdipine 20 milliGRAM(s) Oral three times a day  niCARdipine Infusion 2.5 mG/Hr (12.5 mL/Hr) IV Continuous <Continuous>  pantoprazole  Injectable 40 milliGRAM(s) IV Push daily    MEDICATIONS  (PRN):  dextrose 40% Gel 15 Gram(s) Oral once PRN Blood Glucose LESS THAN 70 milliGRAM(s)/deciLiter  glucagon  Injectable 1 milliGRAM(s) IntraMuscular once PRN Glucose <70 milliGRAM(s)/deciLiter      LABS:                        13.5   13.35 )-----------( 156      ( 27 Aug 2020 01:09 )             42.3     Hgb Trend: 13.5<--, 12.9<--, 14.4<--  08-27    144  |  104  |  13  ----------------------------<  256<H>  4.0   |  27  |  0.80    Ca    8.0<L>      27 Aug 2020 23:18  Phos  3.9     08-27  Mg     2.1     08-27        Creatinine Trend: 0.80<--, 0.75<--, 0.74<--, 0.76<--, 0.77<--, 0.80<--  PT/INR - ( 27 Aug 2020 01:09 )   PT: 11.6 sec;   INR: 0.97 ratio         PTT - ( 27 Aug 2020 01:09 )  PTT:20.1 sec    Arterial Blood Gas:  08-27 @ 08:25  7.49/42/94/32/98/7.6  ABG lactate: --  Arterial Blood Gas:  08-27 @ 00:59  7.49/41/92/31/98/7.7  ABG lactate: --  Arterial Blood Gas:  08-26 @ 09:58  7.46/39/90/27/97/3.8  ABG lactate: --        MICROBIOLOGY:     RADIOLOGY:  [ ] Reviewed and interpreted by me    EKG:      Cindy ABRAMS (ext [phone]) CHIEF COMPLAINT:  Patient is a 70y old  Female who presents with a chief complaint of HHS, Seizure (27 Aug 2020 15:43)    HPI:  71 yo female with PMHx of HTN, BERKLEY, GERD, GIant cell arthritis, Gout presenting to [facility] ED 8/25 PM with 1 week c/o generalized weakness/dysuria/polyuria/polydipsia and RUE dystonia/hand twitching. Pt found to be in hypertensive urgency (/102) s/p total Labetalol 40 IVP in ED. Pt also found to be hyperglycemic to 796 with lactic acidosis started on insulin gtt for concern of HHS vs DKA with rapid correction of blood glucose. Course c/b by acute metabolic encephalopathy/generalized seizures on arrival to MICU requiring urgent intubation for airway protection, IV Keppra loading and sedative gtts for seizure control. CTH on admission possible meningioma.    Interval Events:  Pt successfully extubated weaned to nasal cannula AM of 8/27, oxygenating well. Pt AAOx1, able to intermittently answer questions however restless in bed requiring precedex gtt. Pt remains on nicardipine gtt for hypertension, NGT placed overnight and pt started on PO Nicardipine. Pt transitioned off insulin gtt at 5:30 pm 8/27 s/p NPH 5U at 3:30 pm. As per endo reccs received 28 Lantus at bedtime while remaining NPO status with ISS q6 hrs coverage.     REVIEW OF SYSTEMS:      OBJECTIVE:  ICU Vital Signs Last 24 Hrs  T(C): 36 (28 Aug 2020 03:30), Max: 36.9 (27 Aug 2020 16:00)  T(F): 96.8 (28 Aug 2020 03:30), Max: 98.4 (27 Aug 2020 16:00)  HR: 106 (28 Aug 2020 05:15) (56 - 113)  BP: 163/72 (28 Aug 2020 05:15) (108/57 - 198/106)  BP(mean): 104 (28 Aug 2020 05:15) (76 - 142)  ABP: --  ABP(mean): --  RR: 27 (28 Aug 2020 05:15) (14 - 38)  SpO2: 98% (28 Aug 2020 05:15) (85% - 100%)    Mode: CPAP with PS, FiO2: 40, PEEP: 5, PS: 5, MAP: 8, PIP: 16    08-26 @ 07:01 - 08-27 @ 07:00  --------------------------------------------------------  IN: 6037.9 mL / OUT: 4290 mL / NET: 1747.9 mL    08-27 @ 07:01 - 08-28 @ 05:32  --------------------------------------------------------  IN: 4745.5 mL / OUT: 1475 mL / NET: 3270.5 mL      CAPILLARY BLOOD GLUCOSE  256 (27 Aug 2020 23:15)      POCT Blood Glucose.: 160 mg/dL (27 Aug 2020 17:57)        HOSPITAL MEDICATIONS:  MEDICATIONS  (STANDING):  aspirin Suppository 300 milliGRAM(s) Rectal daily  atorvastatin 80 milliGRAM(s) Oral at bedtime  cefTRIAXone   IVPB 1000 milliGRAM(s) IV Intermittent every 24 hours  chlorhexidine 4% Liquid 1 Application(s) Topical <User Schedule>  dexMEDEtomidine Infusion 0.2 MICROgram(s)/kG/Hr (6.35 mL/Hr) IV Continuous <Continuous>  dextrose 5%. 1000 milliLiter(s) (50 mL/Hr) IV Continuous <Continuous>  dextrose 50% Injectable 12.5 Gram(s) IV Push once  dextrose 50% Injectable 25 Gram(s) IV Push once  dextrose 50% Injectable 25 Gram(s) IV Push once  enoxaparin Injectable 40 milliGRAM(s) SubCutaneous every 12 hours  insulin glargine Injectable (LANTUS) 28 Unit(s) SubCutaneous at bedtime  insulin lispro (HumaLOG) corrective regimen sliding scale   SubCutaneous every 6 hours  lactated ringers. 1000 milliLiter(s) (100 mL/Hr) IV Continuous <Continuous>  levETIRAcetam  IVPB 1000 milliGRAM(s) IV Intermittent every 12 hours  niCARdipine 20 milliGRAM(s) Oral three times a day  niCARdipine Infusion 2.5 mG/Hr (12.5 mL/Hr) IV Continuous <Continuous>  pantoprazole  Injectable 40 milliGRAM(s) IV Push daily    MEDICATIONS  (PRN):  dextrose 40% Gel 15 Gram(s) Oral once PRN Blood Glucose LESS THAN 70 milliGRAM(s)/deciLiter  glucagon  Injectable 1 milliGRAM(s) IntraMuscular once PRN Glucose <70 milliGRAM(s)/deciLiter      LABS:                        13.5   13.35 )-----------( 156      ( 27 Aug 2020 01:09 )             42.3     Hgb Trend: 13.5<--, 12.9<--, 14.4<--  08-27    144  |  104  |  13  ----------------------------<  256<H>  4.0   |  27  |  0.80    Ca    8.0<L>      27 Aug 2020 23:18  Phos  3.9     08-27  Mg     2.1     08-27        Creatinine Trend: 0.80<--, 0.75<--, 0.74<--, 0.76<--, 0.77<--, 0.80<--  PT/INR - ( 27 Aug 2020 01:09 )   PT: 11.6 sec;   INR: 0.97 ratio         PTT - ( 27 Aug 2020 01:09 )  PTT:20.1 sec    Arterial Blood Gas:  08-27 @ 08:25  7.49/42/94/32/98/7.6  ABG lactate: --  Arterial Blood Gas:  08-27 @ 00:59  7.49/41/92/31/98/7.7  ABG lactate: --  Arterial Blood Gas:  08-26 @ 09:58  7.46/39/90/27/97/3.8  ABG lactate: --    MICROBIOLOGY:   CULTURE RESULTS:                08-26-20 @ 17:11  Specimen Source: --  Method Type: --  Gram Stain - RRL: --  Gram Stain - Wound: --  Bacteria: --  Culture Results:   Normal Respiratory Carolyne present    Specimen Source:   Method Type:   Gram Stain:   Culture Results: Culture Results:   Normal Respiratory Carolyne present (08-26-20 @ 17:11)  Culture Results:   No growth to date. (08-26-20 @ 01:23)  Culture Results:   No growth to date. (08-26-20 @ 01:19)    Bacteria: Bacteria: Few (08-26-20 @ 00:16)    RADIOLOGY:  < from: Xray Chest 1 View AP/PA (08.26.20 @ 06:28) >  EXAM:  XR CHEST AP OR PA 1V                            PROCEDURE DATE:  08/26/2020      INTERPRETATION:  XR CHEST. One view.  INDICATION: intubation and OGT placement  COMPARISON: Same day at 12:05 AM    FINDINGS/  IMPRESSION:  Enteric tube within the stomach. Endotracheal tube 1 cm above the hung. Left IJ central line within SVC..  Clear lungs. No pleural effusion or pneumothorax.  < end of copied text >    EXAM:  CT ANGIO BRAIN (W)AW IC                        EXAM:  CT BRAIN                        EXAM:  CT ANGIO NECK (W)AW IC                      < from: CT Head No Cont (08.26.20 @ 01:18) >    IMPRESSION:  NONCONTRAST HEAD CT SCAN:  1. No CT evidence of acute intracranial hemorrhage, mass effect or acute territorial infarct.  2. There is a 1.3 cm lobulated bony density seen posterior to the to the left IAC in the left CP angle, nonspecific, may represent meningioma. Further evaluation with MRI can be obtained..    CT ANGIOGRAPHY NECK: Patent cervical vasculature.  No hemodynamically significant carotid stenosis or flow-limiting vertebral artery stenosis.  No evidence of dissection.    CT ANGIOGRAPHY BRAIN: No vessel occlusion, flow-limiting stenosis or aneurysm is identified about the [place].    < end of copied text >    EKG:    Cindy ABRAMS (ext [phone]) CHIEF COMPLAINT:  Patient is a 70y old  Female who presents with a chief complaint of HHS, Seizure (27 Aug 2020 15:43)    HPI:  69 yo female with PMHx of HTN, BERKLEY, GERD, GIant cell arthritis, Gout presenting to [facility] ED 8/25 PM with 1 week c/o generalized weakness/dysuria/polyuria/polydipsia and RUE dystonia/hand twitching. Pt found to be in hypertensive urgency (/102) s/p total Labetalol 40 IVP in ED. Pt also found to be hyperglycemic to 796 with lactic acidosis started on insulin gtt for concern of HHS vs DKA with rapid correction of blood glucose. Course c/b by acute metabolic encephalopathy/generalized seizures on arrival to MICU requiring urgent intubation for airway protection, IV Keppra loading and sedative gtts for seizure control. CTH on admission possible meningioma.    Interval Events:  Pt successfully extubated weaned to nasal cannula AM of 8/27, oxygenating well. Pt AAOx1, able to intermittently answer questions however restless in bed requiring precedex gtt. Pt remains on nicardipine gtt for hypertension, NGT placed overnight and pt started on PO Nicardipine. Pt transitioned off insulin gtt at 5:30 pm 8/27 s/p NPH 5U at 3:30 pm. As per endo reccs received 28 Lantus at bedtime while remaining NPO status with ISS q6 hrs coverage.     REVIEW OF SYSTEMS:      OBJECTIVE:  ICU Vital Signs Last 24 Hrs  T(C): 36 (28 Aug 2020 03:30), Max: 36.9 (27 Aug 2020 16:00)  T(F): 96.8 (28 Aug 2020 03:30), Max: 98.4 (27 Aug 2020 16:00)  HR: 106 (28 Aug 2020 05:15) (56 - 113)  BP: 163/72 (28 Aug 2020 05:15) (108/57 - 198/106)  BP(mean): 104 (28 Aug 2020 05:15) (76 - 142)  ABP: --  ABP(mean): --  RR: 27 (28 Aug 2020 05:15) (14 - 38)  SpO2: 98% (28 Aug 2020 05:15) (85% - 100%)    Mode: CPAP with PS, FiO2: 40, PEEP: 5, PS: 5, MAP: 8, PIP: 16    08-26 @ 07:01 - 08-27 @ 07:00  --------------------------------------------------------  IN: 6037.9 mL / OUT: 4290 mL / NET: 1747.9 mL    08-27 @ 07:01 - 08-28 @ 05:32  --------------------------------------------------------  IN: 4745.5 mL / OUT: 1475 mL / NET: 3270.5 mL      PHYSICAL EXAM:  Neuro:  AAOx1, restless, answers to name and intermittently answering questions and following commands. Pupils 2 mm reactive equal.     Pulm:  On aerosol face mask. diminished in lower lung fields bases to 1/4 up. no rales, crackles, or wheezing. Clear throughout, SPO2 98%.      CV:  cardiac monitor sinus without ectopy, + s1/s2, peripheral pulses palpable with radial 2+ bilat, dp/pt 1+/1+ bilat, digits warm to touch with good cap refill < 3 secs      GI/:  abd soft, obese, non distended. non tender, + hypoactive bowel sounds. contreras patent to bsd bladder non distended non palpable    Skin:  warm dry intact. without palpable nodes. without JVD appreciated    Lines: L Radial Artery A-line (8/26-8/27), L IJ TLC (8/26-8/27), NGT (8/27)    HOSPITAL MEDICATIONS:  MEDICATIONS  (STANDING):  aspirin Suppository 300 milliGRAM(s) Rectal daily  atorvastatin 80 milliGRAM(s) Oral at bedtime  cefTRIAXone   IVPB 1000 milliGRAM(s) IV Intermittent every 24 hours  chlorhexidine 4% Liquid 1 Application(s) Topical <User Schedule>  dexMEDEtomidine Infusion 0.2 MICROgram(s)/kG/Hr (6.35 mL/Hr) IV Continuous <Continuous>  dextrose 5%. 1000 milliLiter(s) (50 mL/Hr) IV Continuous <Continuous>  dextrose 50% Injectable 12.5 Gram(s) IV Push once  dextrose 50% Injectable 25 Gram(s) IV Push once  dextrose 50% Injectable 25 Gram(s) IV Push once  enoxaparin Injectable 40 milliGRAM(s) SubCutaneous every 12 hours  insulin glargine Injectable (LANTUS) 28 Unit(s) SubCutaneous at bedtime  insulin lispro (HumaLOG) corrective regimen sliding scale   SubCutaneous every 6 hours  lactated ringers. 1000 milliLiter(s) (100 mL/Hr) IV Continuous <Continuous>  levETIRAcetam  IVPB 1000 milliGRAM(s) IV Intermittent every 12 hours  niCARdipine 20 milliGRAM(s) Oral three times a day  niCARdipine Infusion 2.5 mG/Hr (12.5 mL/Hr) IV Continuous <Continuous>  pantoprazole  Injectable 40 milliGRAM(s) IV Push daily    MEDICATIONS  (PRN):  dextrose 40% Gel 15 Gram(s) Oral once PRN Blood Glucose LESS THAN 70 milliGRAM(s)/deciLiter  glucagon  Injectable 1 milliGRAM(s) IntraMuscular once PRN Glucose <70 milliGRAM(s)/deciLiter      LABS:                        13.5   13.35 )-----------( 156      ( 27 Aug 2020 01:09 )             42.3     Hgb Trend: 13.5<--, 12.9<--, 14.4<--  08-27    144  |  104  |  13  ----------------------------<  256<H>  4.0   |  27  |  0.80    Ca    8.0<L>      27 Aug 2020 23:18  Phos  3.9     08-27  Mg     2.1     08-27        Creatinine Trend: 0.80<--, 0.75<--, 0.74<--, 0.76<--, 0.77<--, 0.80<--  PT/INR - ( 27 Aug 2020 01:09 )   PT: 11.6 sec;   INR: 0.97 ratio         PTT - ( 27 Aug 2020 01:09 )  PTT:20.1 sec    Arterial Blood Gas:  08-27 @ 08:25  7.49/42/94/32/98/7.6  ABG lactate: --  Arterial Blood Gas:  08-27 @ 00:59  7.49/41/92/31/98/7.7  ABG lactate: --  Arterial Blood Gas:  08-26 @ 09:58  7.46/39/90/27/97/3.8  ABG lactate: --    MICROBIOLOGY:   CULTURE RESULTS:                08-26-20 @ 17:11  Specimen Source: --  Method Type: --  Gram Stain - RRL: --  Gram Stain - Wound: --  Bacteria: --  Culture Results:   Normal Respiratory Carolyne present    Specimen Source:   Method Type:   Gram Stain:   Culture Results: Culture Results:   Normal Respiratory Carolyne present (08-26-20 @ 17:11)  Culture Results:   No growth to date. (08-26-20 @ 01:23)  Culture Results:   No growth to date. (08-26-20 @ 01:19)    Bacteria: Bacteria: Few (08-26-20 @ 00:16)    RADIOLOGY:  < from: Xray Chest 1 View AP/PA (08.26.20 @ 06:28) >  EXAM:  XR CHEST AP OR PA 1V                            PROCEDURE DATE:  08/26/2020      INTERPRETATION:  XR CHEST. One view.  INDICATION: intubation and OGT placement  COMPARISON: Same day at 12:05 AM    FINDINGS/  IMPRESSION:  Enteric tube within the stomach. Endotracheal tube 1 cm above the hung. Left IJ central line within SVC..  Clear lungs. No pleural effusion or pneumothorax.  < end of copied text >    EXAM:  CT ANGIO BRAIN (W)AW IC                        EXAM:  CT BRAIN                        EXAM:  CT ANGIO NECK (W)AW IC                      < from: CT Head No Cont (08.26.20 @ 01:18) >    IMPRESSION:  NONCONTRAST HEAD CT SCAN:  1. No CT evidence of acute intracranial hemorrhage, mass effect or acute territorial infarct.  2. There is a 1.3 cm lobulated bony density seen posterior to the to the left IAC in the left CP angle, nonspecific, may represent meningioma. Further evaluation with MRI can be obtained..    CT ANGIOGRAPHY NECK: Patent cervical vasculature.  No hemodynamically significant carotid stenosis or flow-limiting vertebral artery stenosis.  No evidence of dissection.    CT ANGIOGRAPHY BRAIN: No vessel occlusion, flow-limiting stenosis or aneurysm is identified about the [place] of [place].    < end of copied text >    EKG:    Cindy ABRAMS (ext [phone]) CHIEF COMPLAINT:  Patient is a 70y old  Female who presents with a chief complaint of HHS, Seizure (27 Aug 2020 15:43)    HPI:  69 yo female with PMHx of HTN, BERKLEY, GERD, GIant cell arthritis, Gout presenting to [facility] ED 8/25 PM with 1 week c/o generalized weakness/dysuria/polyuria/polydipsia and RUE dystonia/hand twitching. Pt found to be in hypertensive urgency (/102) s/p total Labetalol 40 IVP in ED. Pt also found to be hyperglycemic to 796 with lactic acidosis started on insulin gtt for concern of HHS vs DKA with rapid correction of blood glucose. Course c/b by acute metabolic encephalopathy/generalized seizures on arrival to MICU requiring urgent intubation for airway protection, IV Keppra loading and sedative gtts for seizure control. CTH on admission possible meningioma.    Interval Events:  Pt successfully extubated weaned to nasal cannula AM of 8/27, oxygenating well. Pt AAOx1, able to intermittently answer questions however restless in bed requiring precedex gtt. Pt remains on nicardipine gtt for hypertension, NGT placed overnight and pt started on PO Nicardipine. Pt transitioned off insulin gtt at 5:30 pm 8/27 s/p NPH 5U at 3:30 pm. As per endo reccs received 28 Lantus at bedtime while remaining NPO status with ISS q6 hrs coverage.     REVIEW OF SYSTEMS:  answering yes and no to questioning, denying chest pain, SOB, n/v/d/c, HA. answers yes to being "uncomfortable" yet cannot localize pain.    OBJECTIVE:  ICU Vital Signs Last 24 Hrs  T(C): 36 (28 Aug 2020 03:30), Max: 36.9 (27 Aug 2020 16:00)  T(F): 96.8 (28 Aug 2020 03:30), Max: 98.4 (27 Aug 2020 16:00)  HR: 106 (28 Aug 2020 05:15) (56 - 113)  BP: 163/72 (28 Aug 2020 05:15) (108/57 - 198/106)  BP(mean): 104 (28 Aug 2020 05:15) (76 - 142)  ABP: --  ABP(mean): --  RR: 27 (28 Aug 2020 05:15) (14 - 38)  SpO2: 98% (28 Aug 2020 05:15) (85% - 100%)    Mode: CPAP with PS, FiO2: 40, PEEP: 5, PS: 5, MAP: 8, PIP: 16    08-26 @ 07:01 - 08-27 @ 07:00  --------------------------------------------------------  IN: 6037.9 mL / OUT: 4290 mL / NET: 1747.9 mL    08-27 @ 07:01 - 08-28 @ 05:32  --------------------------------------------------------  IN: 4745.5 mL / OUT: 1475 mL / NET: 3270.5 mL      PHYSICAL EXAM:  Neuro:  AAOx1, restless, answers to name and intermittently answering questions yes or no and following commands. Pupils 2 mm reactive equal.     Pulm:  on 3L NC, diminished in lower lung fields bases to 1/4 up. no rales, crackles, or wheezing. Clear throughout, SPO2 98%.      CV:  cardiac monitor sinus without ectopy, + s1/s2, peripheral pulses palpable with radial 2+ bilat, dp/pt 1+/1+ bilat, digits warm to touch with good cap refill < 3 secs      GI/:  abd soft, obese, non distended. non tender, + hypoactive bowel sounds. contreras patent to bsd bladder non distended non palpable    Skin:  warm dry intact. without palpable nodes. without JVD appreciated    Lines: L Radial Artery A-line (8/26-8/27), L IJ TLC (8/26-8/27), NGT (8/27)    HOSPITAL MEDICATIONS:  MEDICATIONS  (STANDING):  aspirin Suppository 300 milliGRAM(s) Rectal daily  atorvastatin 80 milliGRAM(s) Oral at bedtime  cefTRIAXone   IVPB 1000 milliGRAM(s) IV Intermittent every 24 hours  chlorhexidine 4% Liquid 1 Application(s) Topical <User Schedule>  dexMEDEtomidine Infusion 0.2 MICROgram(s)/kG/Hr (6.35 mL/Hr) IV Continuous <Continuous>  dextrose 5%. 1000 milliLiter(s) (50 mL/Hr) IV Continuous <Continuous>  dextrose 50% Injectable 12.5 Gram(s) IV Push once  dextrose 50% Injectable 25 Gram(s) IV Push once  dextrose 50% Injectable 25 Gram(s) IV Push once  enoxaparin Injectable 40 milliGRAM(s) SubCutaneous every 12 hours  insulin glargine Injectable (LANTUS) 28 Unit(s) SubCutaneous at bedtime  insulin lispro (HumaLOG) corrective regimen sliding scale   SubCutaneous every 6 hours  lactated ringers. 1000 milliLiter(s) (100 mL/Hr) IV Continuous <Continuous>  levETIRAcetam  IVPB 1000 milliGRAM(s) IV Intermittent every 12 hours  niCARdipine 20 milliGRAM(s) Oral three times a day  niCARdipine Infusion 2.5 mG/Hr (12.5 mL/Hr) IV Continuous <Continuous>  pantoprazole  Injectable 40 milliGRAM(s) IV Push daily    MEDICATIONS  (PRN):  dextrose 40% Gel 15 Gram(s) Oral once PRN Blood Glucose LESS THAN 70 milliGRAM(s)/deciLiter  glucagon  Injectable 1 milliGRAM(s) IntraMuscular once PRN Glucose <70 milliGRAM(s)/deciLiter      LABS:                        13.5   13.35 )-----------( 156      ( 27 Aug 2020 01:09 )             42.3     Hgb Trend: 13.5<--, 12.9<--, 14.4<--  08-27    144  |  104  |  13  ----------------------------<  256<H>  4.0   |  27  |  0.80    Ca    8.0<L>      27 Aug 2020 23:18  Phos  3.9     08-27  Mg     2.1     08-27        Creatinine Trend: 0.80<--, 0.75<--, 0.74<--, 0.76<--, 0.77<--, 0.80<--  PT/INR - ( 27 Aug 2020 01:09 )   PT: 11.6 sec;   INR: 0.97 ratio         PTT - ( 27 Aug 2020 01:09 )  PTT:20.1 sec    Arterial Blood Gas:  08-27 @ 08:25  7.49/42/94/32/98/7.6  ABG lactate: --  Arterial Blood Gas:  08-27 @ 00:59  7.49/41/92/31/98/7.7  ABG lactate: --  Arterial Blood Gas:  08-26 @ 09:58  7.46/39/90/27/97/3.8  ABG lactate: --    MICROBIOLOGY:   CULTURE RESULTS:                08-26-20 @ 17:11  Specimen Source: --  Method Type: --  Gram Stain - RRL: --  Gram Stain - Wound: --  Bacteria: --  Culture Results:   Normal Respiratory Carolyne present    Specimen Source:   Method Type:   Gram Stain:   Culture Results: Culture Results:   Normal Respiratory Carolyne present (08-26-20 @ 17:11)  Culture Results:   No growth to date. (08-26-20 @ 01:23)  Culture Results:   No growth to date. (08-26-20 @ 01:19)    Bacteria: Bacteria: Few (08-26-20 @ 00:16)    RADIOLOGY:  < from: Xray Chest 1 View AP/PA (08.26.20 @ 06:28) >  EXAM:  XR CHEST AP OR PA 1V                            PROCEDURE DATE:  08/26/2020      INTERPRETATION:  XR CHEST. One view.  INDICATION: intubation and OGT placement  COMPARISON: Same day at 12:05 AM    FINDINGS/  IMPRESSION:  Enteric tube within the stomach. Endotracheal tube 1 cm above the hung. Left IJ central line within SVC..  Clear lungs. No pleural effusion or pneumothorax.  < end of copied text >    EXAM:  CT ANGIO BRAIN (W)AW IC                        EXAM:  CT BRAIN                        EXAM:  CT ANGIO NECK (W)AW IC                      < from: CT Head No Cont (08.26.20 @ 01:18) >    IMPRESSION:  NONCONTRAST HEAD CT SCAN:  1. No CT evidence of acute intracranial hemorrhage, mass effect or acute territorial infarct.  2. There is a 1.3 cm lobulated bony density seen posterior to the to the left IAC in the left CP angle, nonspecific, may represent meningioma. Further evaluation with MRI can be obtained..    CT ANGIOGRAPHY NECK: Patent cervical vasculature.  No hemodynamically significant carotid stenosis or flow-limiting vertebral artery stenosis.  No evidence of dissection.    CT ANGIOGRAPHY BRAIN: No vessel occlusion, flow-limiting stenosis or aneurysm is identified about the [place] of [place].    < end of copied text >    EKG:    Cindy ABRAMS (ext [phone]) CHIEF COMPLAINT:  Patient is a 70y old  Female who presents with a chief complaint of HHS, Seizure (27 Aug 2020 15:43)    HPI:  69 yo female with PMHx of HTN, BERKLEY, GERD, GIant cell arthritis, Gout presenting to [facility] ED 8/25 PM with 1 week c/o generalized weakness/dysuria/polyuria/polydipsia and RUE dystonia/hand twitching. Pt found to be in hypertensive urgency (/102) s/p total Labetalol 40 IVP in ED. Pt also found to be hyperglycemic to 796 with lactic acidosis started on insulin gtt for concern of HHS vs DKA with rapid correction of blood glucose. Course c/b by acute metabolic encephalopathy/generalized seizures on arrival to MICU requiring urgent intubation for airway protection, IV Keppra loading and sedative gtts for seizure control. CTH on admission possible meningioma.    Interval Events:  Pt successfully extubated weaned to nasal cannula AM of 8/27, oxygenating well. Pt AAOx1, able to intermittently answer questions however restless in bed requiring precedex gtt. Pt remains on nicardipine gtt for hypertension, NGT placed overnight and pt started on PO Nicardipine. Pt transitioned off insulin gtt at 5:30 pm 8/27 s/p NPH 5U at 3:30 pm. As per endo reccs received 28 Lantus at bedtime while remaining NPO status with ISS q6 hrs coverage.     REVIEW OF SYSTEMS:  answering yes and no to questioning, denying chest pain, SOB, n/v/d/c, HA. answers yes to being "uncomfortable" yet cannot localize pain.    OBJECTIVE:  ICU Vital Signs Last 24 Hrs  T(C): 36 (28 Aug 2020 03:30), Max: 36.9 (27 Aug 2020 16:00)  T(F): 96.8 (28 Aug 2020 03:30), Max: 98.4 (27 Aug 2020 16:00)  HR: 106 (28 Aug 2020 05:15) (56 - 113)  BP: 163/72 (28 Aug 2020 05:15) (108/57 - 198/106)  BP(mean): 104 (28 Aug 2020 05:15) (76 - 142)  ABP: --  ABP(mean): --  RR: 27 (28 Aug 2020 05:15) (14 - 38)  SpO2: 98% (28 Aug 2020 05:15) (85% - 100%)    Mode: CPAP with PS, FiO2: 40, PEEP: 5, PS: 5, MAP: 8, PIP: 16    08-26 @ 07:01 - 08-27 @ 07:00  --------------------------------------------------------  IN: 6037.9 mL / OUT: 4290 mL / NET: 1747.9 mL    08-27 @ 07:01 - 08-28 @ 05:32  --------------------------------------------------------  IN: 4745.5 mL / OUT: 1475 mL / NET: 3270.5 mL      PHYSICAL EXAM:  Neuro:  AAOx1-2, restless, answers to name and intermittently answering questions yes or no and following commands. Pupils 2 mm reactive equal.     Pulm:  on 3L NC, diminished in lower lung fields bases to 1/4 up. no rales, crackles, or wheezing. Clear throughout, SPO2 98%.      CV:  cardiac monitor sinus without ectopy, + s1/s2, peripheral pulses palpable with radial 2+ bilat, dp/pt 1+/1+ bilat, digits warm to touch with good cap refill < 3 secs      GI/:  abd soft, obese, non distended. non tender, + hypoactive bowel sounds. contreras patent to bsd bladder non distended non palpable    Skin:  warm dry intact. without palpable nodes. without JVD appreciated    Lines: L Radial Artery A-line (8/26-8/27), L IJ TLC (8/26-8/27), NGT (8/27)    HOSPITAL MEDICATIONS:  MEDICATIONS  (STANDING):  aspirin Suppository 300 milliGRAM(s) Rectal daily  atorvastatin 80 milliGRAM(s) Oral at bedtime  cefTRIAXone   IVPB 1000 milliGRAM(s) IV Intermittent every 24 hours  chlorhexidine 4% Liquid 1 Application(s) Topical <User Schedule>  dexMEDEtomidine Infusion 0.2 MICROgram(s)/kG/Hr (6.35 mL/Hr) IV Continuous <Continuous>  dextrose 5%. 1000 milliLiter(s) (50 mL/Hr) IV Continuous <Continuous>  dextrose 50% Injectable 12.5 Gram(s) IV Push once  dextrose 50% Injectable 25 Gram(s) IV Push once  dextrose 50% Injectable 25 Gram(s) IV Push once  enoxaparin Injectable 40 milliGRAM(s) SubCutaneous every 12 hours  insulin glargine Injectable (LANTUS) 28 Unit(s) SubCutaneous at bedtime  insulin lispro (HumaLOG) corrective regimen sliding scale   SubCutaneous every 6 hours  lactated ringers. 1000 milliLiter(s) (100 mL/Hr) IV Continuous <Continuous>  levETIRAcetam  IVPB 1000 milliGRAM(s) IV Intermittent every 12 hours  niCARdipine 20 milliGRAM(s) Oral three times a day  niCARdipine Infusion 2.5 mG/Hr (12.5 mL/Hr) IV Continuous <Continuous>  pantoprazole  Injectable 40 milliGRAM(s) IV Push daily    MEDICATIONS  (PRN):  dextrose 40% Gel 15 Gram(s) Oral once PRN Blood Glucose LESS THAN 70 milliGRAM(s)/deciLiter  glucagon  Injectable 1 milliGRAM(s) IntraMuscular once PRN Glucose <70 milliGRAM(s)/deciLiter      LABS:                        13.5   13.35 )-----------( 156      ( 27 Aug 2020 01:09 )             42.3     Hgb Trend: 13.5<--, 12.9<--, 14.4<--  08-27    144  |  104  |  13  ----------------------------<  256<H>  4.0   |  27  |  0.80    Ca    8.0<L>      27 Aug 2020 23:18  Phos  3.9     08-27  Mg     2.1     08-27        Creatinine Trend: 0.80<--, 0.75<--, 0.74<--, 0.76<--, 0.77<--, 0.80<--  PT/INR - ( 27 Aug 2020 01:09 )   PT: 11.6 sec;   INR: 0.97 ratio         PTT - ( 27 Aug 2020 01:09 )  PTT:20.1 sec    Arterial Blood Gas:  08-27 @ 08:25  7.49/42/94/32/98/7.6  ABG lactate: --  Arterial Blood Gas:  08-27 @ 00:59  7.49/41/92/31/98/7.7  ABG lactate: --  Arterial Blood Gas:  08-26 @ 09:58  7.46/39/90/27/97/3.8  ABG lactate: --    MICROBIOLOGY:   CULTURE RESULTS:                08-26-20 @ 17:11  Specimen Source: --  Method Type: --  Gram Stain - RRL: --  Gram Stain - Wound: --  Bacteria: --  Culture Results:   Normal Respiratory Carolyne present    Specimen Source:   Method Type:   Gram Stain:   Culture Results: Culture Results:   Normal Respiratory Carolyne present (08-26-20 @ 17:11)  Culture Results:   No growth to date. (08-26-20 @ 01:23)  Culture Results:   No growth to date. (08-26-20 @ 01:19)    Bacteria: Bacteria: Few (08-26-20 @ 00:16)    RADIOLOGY:  < from: Xray Chest 1 View AP/PA (08.26.20 @ 06:28) >  EXAM:  XR CHEST AP OR PA 1V                            PROCEDURE DATE:  08/26/2020      INTERPRETATION:  XR CHEST. One view.  INDICATION: intubation and OGT placement  COMPARISON: Same day at 12:05 AM    FINDINGS/  IMPRESSION:  Enteric tube within the stomach. Endotracheal tube 1 cm above the hung. Left IJ central line within SVC..  Clear lungs. No pleural effusion or pneumothorax.  < end of copied text >    EXAM:  CT ANGIO BRAIN (W)AW IC                        EXAM:  CT BRAIN                        EXAM:  CT ANGIO NECK (W)AW IC                      < from: CT Head No Cont (08.26.20 @ 01:18) >    IMPRESSION:  NONCONTRAST HEAD CT SCAN:  1. No CT evidence of acute intracranial hemorrhage, mass effect or acute territorial infarct.  2. There is a 1.3 cm lobulated bony density seen posterior to the to the left IAC in the left CP angle, nonspecific, may represent meningioma. Further evaluation with MRI can be obtained..    CT ANGIOGRAPHY NECK: Patent cervical vasculature.  No hemodynamically significant carotid stenosis or flow-limiting vertebral artery stenosis.  No evidence of dissection.    CT ANGIOGRAPHY BRAIN: No vessel occlusion, flow-limiting stenosis or aneurysm is identified about the [place].    < end of copied text >    EKG:    Cindy ABRAMS (ext [id number]) CHIEF COMPLAINT:  Patient is a 70y old  Female who presents with a chief complaint of HHS, Seizure (27 Aug 2020 15:43)    HPI:  69 yo female with PMHx of HTN, BERKLEY, GERD, GIant cell arthritis, Gout presenting to [facility] ED 8/25 PM with 1 week c/o generalized weakness/dysuria/polyuria/polydipsia and RUE dystonia/hand twitching. Pt found to be in hypertensive urgency (/102) s/p total Labetalol 40 IVP in ED. Pt also found to be hyperglycemic to 796 with lactic acidosis started on insulin gtt for concern of HHS vs DKA with rapid correction of blood glucose. Course c/b by acute metabolic encephalopathy/generalized seizures on arrival to MICU requiring urgent intubation for airway protection, IV Keppra loading and sedative gtts for seizure control. CTH on admission possible meningioma.    Interval Events:  Pt successfully extubated weaned to nasal cannula AM of 8/27, oxygenating well. Pt AAOx1, able to intermittently answer questions however restless in bed requiring precedex gtt. Pt remains on nicardipine gtt for hypertension, NGT placed overnight and pt started on PO Nicardipine. Pt transitioned off insulin gtt at 5:30 pm 8/27 s/p NPH 5U at 3:30 pm. As per endo reccs received 28 Lantus at bedtime while remaining NPO status with ISS q6 hrs coverage.     REVIEW OF SYSTEMS:  answering yes and no to questioning, denying chest pain, SOB, n/v/d/c, HA. answers yes to being "uncomfortable" yet cannot localize pain.    OBJECTIVE:  ICU Vital Signs Last 24 Hrs  T(C): 36 (28 Aug 2020 03:30), Max: 36.9 (27 Aug 2020 16:00)  T(F): 96.8 (28 Aug 2020 03:30), Max: 98.4 (27 Aug 2020 16:00)  HR: 106 (28 Aug 2020 05:15) (56 - 113)  BP: 163/72 (28 Aug 2020 05:15) (108/57 - 198/106)  BP(mean): 104 (28 Aug 2020 05:15) (76 - 142)  ABP: --  ABP(mean): --  RR: 27 (28 Aug 2020 05:15) (14 - 38)  SpO2: 98% (28 Aug 2020 05:15) (85% - 100%)    Mode: CPAP with PS, FiO2: 40, PEEP: 5, PS: 5, MAP: 8, PIP: 16    08-26 @ 07:01 - 08-27 @ 07:00  --------------------------------------------------------  IN: 6037.9 mL / OUT: 4290 mL / NET: 1747.9 mL    08-27 @ 07:01 - 08-28 @ 05:32  --------------------------------------------------------  IN: 4745.5 mL / OUT: 1475 mL / NET: 3270.5 mL      PHYSICAL EXAM:  Neuro:  AAOx1-2, restless, answers to name and intermittently answering questions yes or no and following commands. Pupils 2 mm reactive equal. no focal deficits, moves all extremities equally 3/5 strength lower 4/5 upper    Pulm:  on 3L NC, diminished in lower lung fields bases to 1/4 up. no rales, crackles, or wheezing. Clear throughout, SPO2 98%.      CV:  cardiac monitor sinus without ectopy, + s1/s2, peripheral pulses palpable with radial 2+ bilat, dp/pt 1+/1+ bilat, digits warm to touch with good cap refill < 3 secs      GI/:  abd soft, obese, non distended. non tender, + hypoactive bowel sounds. contreras patent to bsd bladder non distended non palpable    Skin:  warm dry intact. without palpable nodes. without JVD appreciated    Lines: L Radial Artery A-line (8/26-8/27), L IJ TLC (8/26-8/27), NGT (8/27)    HOSPITAL MEDICATIONS:  MEDICATIONS  (STANDING):  aspirin Suppository 300 milliGRAM(s) Rectal daily  atorvastatin 80 milliGRAM(s) Oral at bedtime  cefTRIAXone   IVPB 1000 milliGRAM(s) IV Intermittent every 24 hours  chlorhexidine 4% Liquid 1 Application(s) Topical <User Schedule>  dexMEDEtomidine Infusion 0.2 MICROgram(s)/kG/Hr (6.35 mL/Hr) IV Continuous <Continuous>  dextrose 5%. 1000 milliLiter(s) (50 mL/Hr) IV Continuous <Continuous>  dextrose 50% Injectable 12.5 Gram(s) IV Push once  dextrose 50% Injectable 25 Gram(s) IV Push once  dextrose 50% Injectable 25 Gram(s) IV Push once  enoxaparin Injectable 40 milliGRAM(s) SubCutaneous every 12 hours  insulin glargine Injectable (LANTUS) 28 Unit(s) SubCutaneous at bedtime  insulin lispro (HumaLOG) corrective regimen sliding scale   SubCutaneous every 6 hours  lactated ringers. 1000 milliLiter(s) (100 mL/Hr) IV Continuous <Continuous>  levETIRAcetam  IVPB 1000 milliGRAM(s) IV Intermittent every 12 hours  niCARdipine 20 milliGRAM(s) Oral three times a day  niCARdipine Infusion 2.5 mG/Hr (12.5 mL/Hr) IV Continuous <Continuous>  pantoprazole  Injectable 40 milliGRAM(s) IV Push daily    MEDICATIONS  (PRN):  dextrose 40% Gel 15 Gram(s) Oral once PRN Blood Glucose LESS THAN 70 milliGRAM(s)/deciLiter  glucagon  Injectable 1 milliGRAM(s) IntraMuscular once PRN Glucose <70 milliGRAM(s)/deciLiter      LABS:                        13.5   13.35 )-----------( 156      ( 27 Aug 2020 01:09 )             42.3     Hgb Trend: 13.5<--, 12.9<--, 14.4<--  08-27    144  |  104  |  13  ----------------------------<  256<H>  4.0   |  27  |  0.80    Ca    8.0<L>      27 Aug 2020 23:18  Phos  3.9     08-27  Mg     2.1     08-27        Creatinine Trend: 0.80<--, 0.75<--, 0.74<--, 0.76<--, 0.77<--, 0.80<--  PT/INR - ( 27 Aug 2020 01:09 )   PT: 11.6 sec;   INR: 0.97 ratio         PTT - ( 27 Aug 2020 01:09 )  PTT:20.1 sec    Arterial Blood Gas:  08-27 @ 08:25  7.49/42/94/32/98/7.6  ABG lactate: --  Arterial Blood Gas:  08-27 @ 00:59  7.49/41/92/31/98/7.7  ABG lactate: --  Arterial Blood Gas:  08-26 @ 09:58  7.46/39/90/27/97/3.8  ABG lactate: --    MICROBIOLOGY:   CULTURE RESULTS:                08-26-20 @ 17:11  Specimen Source: --  Method Type: --  Gram Stain - RRL: --  Gram Stain - Wound: --  Bacteria: --  Culture Results:   Normal Respiratory Carolyne present    Specimen Source:   Method Type:   Gram Stain:   Culture Results: Culture Results:   Normal Respiratory Carolyne present (08-26-20 @ 17:11)  Culture Results:   No growth to date. (08-26-20 @ 01:23)  Culture Results:   No growth to date. (08-26-20 @ 01:19)    Bacteria: Bacteria: Few (08-26-20 @ 00:16)    RADIOLOGY:  < from: Xray Chest 1 View AP/PA (08.26.20 @ 06:28) >  EXAM:  XR CHEST AP OR PA 1V                            PROCEDURE DATE:  08/26/2020      INTERPRETATION:  XR CHEST. One view.  INDICATION: intubation and OGT placement  COMPARISON: Same day at 12:05 AM    FINDINGS/  IMPRESSION:  Enteric tube within the stomach. Endotracheal tube 1 cm above the hung. Left IJ central line within SVC..  Clear lungs. No pleural effusion or pneumothorax.  < end of copied text >    EXAM:  CT ANGIO BRAIN (W)AW IC                        EXAM:  CT BRAIN                        EXAM:  CT ANGIO NECK (W)AW IC                      < from: CT Head No Cont (08.26.20 @ 01:18) >    IMPRESSION:  NONCONTRAST HEAD CT SCAN:  1. No CT evidence of acute intracranial hemorrhage, mass effect or acute territorial infarct.  2. There is a 1.3 cm lobulated bony density seen posterior to the to the left IAC in the left CP angle, nonspecific, may represent meningioma. Further evaluation with MRI can be obtained..    CT ANGIOGRAPHY NECK: Patent cervical vasculature.  No hemodynamically significant carotid stenosis or flow-limiting vertebral artery stenosis.  No evidence of dissection.    CT ANGIOGRAPHY BRAIN: No vessel occlusion, flow-limiting stenosis or aneurysm is identified about the [place].    < end of copied text >    EKG:    Cindy ABRAMS (ext [id number])

## 2020-08-28 NOTE — DIETITIAN INITIAL EVALUATION ADULT. - OTHER INFO
Pt seen for: BMI > 40 list  Adm dx: HHS, hypertensive emergency, new DM dx this adm, endocrine following.    GI issues: none noted  Last BM: 8/26     Food allergies/Intolerances: NKFA   Vit/supplement PTA: none noted     Diet PTA: per Endocrine consult note "No recent changes to diet per daughter. Pt eats 2 meals a day (breakfast and lunch). no sugar drinks and rarely eats deserts."     Subjective/Objective information: pt sedated, unable to participate in interview, no visitors at bedside. Per HIE review, most current wt available 285 lb on 11/14/18.    Education: Not indicated at this time, provide DM, wt loss education when pt able to participate.

## 2020-08-28 NOTE — PROGRESS NOTE ADULT - ATTENDING COMMENTS
Agree with assessment and plan as above by Dr. Wells. Reviewed all pertinent labs, glucose values, and imaging studies. Modifications made as indicated above. Remains NPO. Increase Lantus to 32 units qhs and monitor on moderate correction q6. Once eating will add Humalog with meals.    Andrea Aquino D.O  [phone]

## 2020-08-28 NOTE — PROGRESS NOTE ADULT - SUBJECTIVE AND OBJECTIVE BOX
Chief Complaint: new onset DM presenting with DKA    History: Pt appears agitated and uncomfortable. Following commands. Not conversive. Has NGT but no plan to start tube feeds per primary team due to increased risk of aspiration.   FS above goal in 200s today.    MEDICATIONS  (STANDING):  allopurinol 100 milliGRAM(s) Oral every 12 hours  aspirin  chewable 81 milliGRAM(s) Oral daily  atorvastatin 80 milliGRAM(s) Oral at bedtime  brimonidine 0.2% Ophthalmic Solution 1 Drop(s) Both EYES two times a day  chlorhexidine 4% Liquid 1 Application(s) Topical <User Schedule>  dexMEDEtomidine Infusion 0.2 MICROgram(s)/kG/Hr (6.35 mL/Hr) IV Continuous <Continuous>  dextrose 5%. 1000 milliLiter(s) (50 mL/Hr) IV Continuous <Continuous>  dextrose 50% Injectable 12.5 Gram(s) IV Push once  dextrose 50% Injectable 25 Gram(s) IV Push once  dextrose 50% Injectable 25 Gram(s) IV Push once  enoxaparin Injectable 40 milliGRAM(s) SubCutaneous every 12 hours  insulin glargine Injectable (LANTUS) 32 Unit(s) SubCutaneous at bedtime  insulin lispro (HumaLOG) corrective regimen sliding scale   SubCutaneous every 6 hours  latanoprost 0.005% Ophthalmic Solution 1 Drop(s) Both EYES at bedtime  levETIRAcetam  IVPB 1000 milliGRAM(s) IV Intermittent every 12 hours  melatonin 3 milliGRAM(s) Oral at bedtime  niCARdipine 40 milliGRAM(s) Oral three times a day  niCARdipine Infusion 2.5 mG/Hr (12.5 mL/Hr) IV Continuous <Continuous>  pantoprazole  Injectable 40 milliGRAM(s) IV Push daily  polyethylene glycol 3350 17 Gram(s) Oral daily    MEDICATIONS  (PRN):  dextrose 40% Gel 15 Gram(s) Oral once PRN Blood Glucose LESS THAN 70 milliGRAM(s)/deciLiter  glucagon  Injectable 1 milliGRAM(s) IntraMuscular once PRN Glucose <70 milliGRAM(s)/deciLiter      Allergies    No Known Allergies    Intolerances      Review of Systems:    UNABLE TO OBTAIN due to agitation    PHYSICAL EXAM:  VITALS: T(C): 36.6 (08-28-20 @ 20:00)  T(F): 97.8 (08-28-20 @ 20:00), Max: 98.7 (08-28-20 @ 06:00)  HR: 101 (08-28-20 @ 20:30) (64 - 124)  BP: 192/100 (08-28-20 @ 20:30) (108/57 - 201/88)  RR:  (19 - 36)  SpO2:  (93% - 100%)  Wt(kg): --  GENERAL: awake with eyes open, appears agitated in slight discomfort   EYES: No proptosis, no lid lag, anicteric  THYROID: Normal size, no palpable nodules  RESPIRATORY: CTA b/l, no wheezes or crackles   CARDIOVASCULAR: S1S2 RRR; no murmurs; no peripheral edema  GI: Soft, nontender, non distended, normal bowel sounds  SKIN: Dry, intact, No rashes or lesions  PSYCH: agitated   Neuro: AAOx0, follows commands, not conversing     POCT Blood Glucose.: 189 mg/dL (08-28-20 @ 17:22)  POCT Blood Glucose.: 233 mg/dL (08-28-20 @ 11:19)  POCT Blood Glucose.: 218 mg/dL (08-28-20 @ 06:01)  POCT Blood Glucose.: 160 mg/dL (08-27-20 @ 17:57)  POCT Blood Glucose.: 158 mg/dL (08-27-20 @ 17:22)  POCT Blood Glucose.: 214 mg/dL (08-27-20 @ 16:19)  POCT Blood Glucose.: 191 mg/dL (08-27-20 @ 14:53)  POCT Blood Glucose.: 202 mg/dL (08-27-20 @ 13:59)  POCT Blood Glucose.: 216 mg/dL (08-27-20 @ 13:14)  POCT Blood Glucose.: 217 mg/dL (08-27-20 @ 12:08)  POCT Blood Glucose.: 260 mg/dL (08-27-20 @ 11:09)  POCT Blood Glucose.: 240 mg/dL (08-27-20 @ 10:09)  POCT Blood Glucose.: 200 mg/dL (08-27-20 @ 08:22)  POCT Blood Glucose.: 312 mg/dL (08-27-20 @ 06:56)  POCT Blood Glucose.: 326 mg/dL (08-27-20 @ 06:06)  POCT Blood Glucose.: 336 mg/dL (08-27-20 @ 04:56)  POCT Blood Glucose.: 327 mg/dL (08-27-20 @ 03:56)  POCT Blood Glucose.: 290 mg/dL (08-27-20 @ 03:01)  POCT Blood Glucose.: 365 mg/dL (08-27-20 @ 02:07)  POCT Blood Glucose.: 283 mg/dL (08-27-20 @ 00:55)  POCT Blood Glucose.: 338 mg/dL (08-27-20 @ 00:00)  POCT Blood Glucose.: 324 mg/dL (08-26-20 @ 22:49)  POCT Blood Glucose.: 339 mg/dL (08-26-20 @ 21:51)  POCT Blood Glucose.: 343 mg/dL (08-26-20 @ 20:57)  POCT Blood Glucose.: 317 mg/dL (08-26-20 @ 19:56)  POCT Blood Glucose.: 348 mg/dL (08-26-20 @ 19:12)  POCT Blood Glucose.: 336 mg/dL (08-26-20 @ 18:29)  POCT Blood Glucose.: 303 mg/dL (08-26-20 @ 17:25)  POCT Blood Glucose.: 296 mg/dL (08-26-20 @ 16:34)  POCT Blood Glucose.: 331 mg/dL (08-26-20 @ 14:42)  POCT Blood Glucose.: 311 mg/dL (08-26-20 @ 13:32)  POCT Blood Glucose.: 330 mg/dL (08-26-20 @ 12:31)  POCT Blood Glucose.: 354 mg/dL (08-26-20 @ 11:03)  POCT Blood Glucose.: 420 mg/dL (08-26-20 @ 09:00)  POCT Blood Glucose.: 123 mg/dL (08-26-20 @ 08:58)  POCT Blood Glucose.: 473 mg/dL (08-26-20 @ 07:38)  POCT Blood Glucose.: 517 mg/dL (08-26-20 @ 06:08)  POCT Blood Glucose.: 550 mg/dL (08-26-20 @ 04:39)  POCT Blood Glucose.: 481 mg/dL (08-26-20 @ 03:28)  POCT Blood Glucose.: 515 mg/dL (08-26-20 @ 03:08)  POCT Blood Glucose.: 553 mg/dL (08-26-20 @ 02:29)  POCT Blood Glucose.: 562 mg/dL (08-26-20 @ 01:29)      08-28    144  |  104  |  14  ----------------------------<  277<H>  3.4<L>   |  25  |  0.80    EGFR if : 86  EGFR if non : 74    Ca    8.1<L>      08-28  Mg     2.0     08-28  Phos  2.9     08-28    TPro  6.7  /  Alb  3.7  /  TBili  0.4  /  DBili  x   /  AST  28  /  ALT  48<H>  /  AlkPhos  93  08-26          Thyroid Function Tests:

## 2020-08-28 NOTE — PROGRESS NOTE ADULT - ATTENDING COMMENTS
I performed a history and physical examination of the patient and discussed the management of the patient with the resident. I reviewed the resident's note and agree with the documented findings and plan of care with the following additions/exceptions.    dos 8/28/20    shes more attentive this morning. able to follow simple commands. calmer, not restless appearing.   await MRI. if no sz focus and this was all hyperglycemia then should be able to taper off keppra, would d/w epilepsy optimal timing

## 2020-08-28 NOTE — PROGRESS NOTE ADULT - SUBJECTIVE AND OBJECTIVE BOX
SUBJECTIVE: No overnight events. EEG w/o seizure activity     Vital Signs Last 24 Hrs  T(C): 36.9 (28 Aug 2020 12:00), Max: 37.1 (28 Aug 2020 06:00)  T(F): 98.4 (28 Aug 2020 12:00), Max: 98.7 (28 Aug 2020 06:00)  HR: 97 (28 Aug 2020 12:30) (65 - 113)  BP: 166/74 (28 Aug 2020 12:30) (108/57 - 180/66)  BP(mean): 106 (28 Aug 2020 12:30) (76 - 120)  RR: 23 (28 Aug 2020 12:30) (20 - 37)  SpO2: 100% (28 Aug 2020 12:30) (94% - 100%)      Neurological (>12):  MS: extubated, has NGT, eyes open, can say her name in a whisper, does not answer other questions, can follow some simple commands, tracks across midline.   CNs: PERRL (R = 4mm, L = 4mm). no gross facial asymmetry, primary gaze neutral  Motor: tone normal, moves extremities spontaneously roughly symmetrically     LABORATORY:  CBC                       12.3   11.41 )-----------( 136      ( 28 Aug 2020 06:09 )             37.9     Chem 08-28    144  |  104  |  14  ----------------------------<  277<H>  3.4<L>   |  25  |  0.80    Ca    8.1<L>      28 Aug 2020 12:39  Phos  2.9     08-28  Mg     2.0     08-28      LFTs   Coagulopathy PT/INR - ( 28 Aug 2020 06:09 )   PT: 12.4 sec;   INR: 1.04 ratio         PTT - ( 28 Aug 2020 06:09 )  PTT:29.1 sec  Lipid Panel 08-28 Chol 150 LDL 62 HDL 67 Trig 105  A1c   Cardiac enzymes     U/A   MEDICATIONS  (STANDING):  allopurinol 100 milliGRAM(s) Oral every 12 hours  aspirin  chewable 81 milliGRAM(s) Oral daily  atorvastatin 80 milliGRAM(s) Oral at bedtime  cefTRIAXone   IVPB 1000 milliGRAM(s) IV Intermittent every 24 hours  chlorhexidine 4% Liquid 1 Application(s) Topical <User Schedule>  dexMEDEtomidine Infusion 0.2 MICROgram(s)/kG/Hr (6.35 mL/Hr) IV Continuous <Continuous>  dextrose 5%. 1000 milliLiter(s) (50 mL/Hr) IV Continuous <Continuous>  dextrose 50% Injectable 12.5 Gram(s) IV Push once  dextrose 50% Injectable 25 Gram(s) IV Push once  dextrose 50% Injectable 25 Gram(s) IV Push once  enoxaparin Injectable 40 milliGRAM(s) SubCutaneous every 12 hours  insulin glargine Injectable (LANTUS) 28 Unit(s) SubCutaneous at bedtime  insulin lispro (HumaLOG) corrective regimen sliding scale   SubCutaneous every 6 hours  levETIRAcetam  IVPB 1000 milliGRAM(s) IV Intermittent every 12 hours  niCARdipine 40 milliGRAM(s) Oral three times a day  niCARdipine Infusion 2.5 mG/Hr (12.5 mL/Hr) IV Continuous <Continuous>  pantoprazole  Injectable 40 milliGRAM(s) IV Push daily  polyethylene glycol 3350 17 Gram(s) Oral daily    MEDICATIONS  (PRN):  dextrose 40% Gel 15 Gram(s) Oral once PRN Blood Glucose LESS THAN 70 milliGRAM(s)/deciLiter  glucagon  Injectable 1 milliGRAM(s) IntraMuscular once PRN Glucose <70 milliGRAM(s)/deciLiter    EEG 8/28_____________________________________________________________  EEG SUMMARY/CLASSIFICATION    Abnormal EEG in an altered patient.  - Mild to moderate generalized slowing    _____________________________________________________________  EEG IMPRESSION/CLINICAL CORRELATE    Abnormal EEG study.  1. Mild to moderate  nonspecific diffuse or multifocal cerebral dysfunction.   2. No epileptiform pattern or seizure seen.

## 2020-08-28 NOTE — PROGRESS NOTE ADULT - ATTENDING COMMENTS
Patient with history of htn and gout hear with weakness/polyuria/polydipsia and RUE twitching found to have HHS, lactic acidosis requiring insulin gtt, then had seizures and acute hypoxic respiratory failure due to seizures requiring intubation. Continues to have slowly improving metnal status.  Suspect she may have had a seizure due to hypertensive urgency.  Will wean cardene gtt by increasing po nicardipine.  Titrate off precedex.  Started on lantus, f/u sugars, adjust as needed.  f/u EEG read and d/w neuro re: weaning off AED.  c/w ceftriaxone for aspiration pna for 5 days.

## 2020-08-28 NOTE — PROGRESS NOTE ADULT - ASSESSMENT
70Y RH F w/ h/o glaucoma, morbid obesity, chronic shoulder pain, gout, B/L LE weakness with knee issues, GCA and HTN who p/w 2 weeks of generalized weakness and 2 days of R. arm and leg tremor.  While in ED, several episodes lasting 30-60 seconds noted with painful spasm of neck to right a/w 1-3 Hz low amplitude shaking of R. arm and R. leg with preserved awareness and consciousness w/o tongue biting, gaze deviation, incontinence or post event confusion.  PT was also notably hypertensive to 206/102 and there was initial concern for vascular induced vs HTN induced L. sided lesion causing acute new onset right sided dystonia.  Upon further lab review, PT found to be in hyperosmolar hyperglycemic state w/ glucose >700.  Per ED and MICU team, PT continued to have several of these episodes but then had two generalized clonic events with acute stupor and dilated pupils lasting up to 2 minutes.  Due to concern for GTCs, PT was given ativan x2, intubated for airway protection and started on propofol and versed gtt. Currently extubated.    Impression: Acute hyperglycemia induced dystonia with tremor vs. possible R. sided EPC from left brain dysfunction and then subsequent generalized seizure. Extubated, off sedation, mental status with minimal improvement.     Recommend:   [x] for now c/w keppra 1 g IV BID for now. Monitor renal function given recent JOVANNI   [] continue EEG at least another 24h, so far negative.   [] Ativan 2mg IVP for any GTC and/or focal seizure >3 min.   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 MRI can be ordered once off EEG if patient able to tolerate   [] Continue ASA 81 daily, atorvastatin 80 with concern of possible stroke as new onset focal lesion.    D/w Dr. Cabral, Epilepsy team and MICU

## 2020-08-29 LAB
ANION GAP SERPL CALC-SCNC: 14 MMOL/L — SIGNIFICANT CHANGE UP (ref 5–17)
ANION GAP SERPL CALC-SCNC: 16 MMOL/L — SIGNIFICANT CHANGE UP (ref 5–17)
APTT BLD: 29 SEC — SIGNIFICANT CHANGE UP (ref 27.5–35.5)
BUN SERPL-MCNC: 15 MG/DL — SIGNIFICANT CHANGE UP (ref 7–23)
BUN SERPL-MCNC: 16 MG/DL — SIGNIFICANT CHANGE UP (ref 7–23)
CALCIUM SERPL-MCNC: 7.9 MG/DL — LOW (ref 8.4–10.5)
CALCIUM SERPL-MCNC: 8.3 MG/DL — LOW (ref 8.4–10.5)
CHLORIDE SERPL-SCNC: 103 MMOL/L — SIGNIFICANT CHANGE UP (ref 96–108)
CHLORIDE SERPL-SCNC: 104 MMOL/L — SIGNIFICANT CHANGE UP (ref 96–108)
CO2 SERPL-SCNC: 24 MMOL/L — SIGNIFICANT CHANGE UP (ref 22–31)
CO2 SERPL-SCNC: 26 MMOL/L — SIGNIFICANT CHANGE UP (ref 22–31)
CREAT SERPL-MCNC: 0.79 MG/DL — SIGNIFICANT CHANGE UP (ref 0.5–1.3)
CREAT SERPL-MCNC: 0.81 MG/DL — SIGNIFICANT CHANGE UP (ref 0.5–1.3)
GLUCOSE BLDC GLUCOMTR-MCNC: 198 MG/DL — HIGH (ref 70–99)
GLUCOSE BLDC GLUCOMTR-MCNC: 206 MG/DL — HIGH (ref 70–99)
GLUCOSE BLDC GLUCOMTR-MCNC: 223 MG/DL — HIGH (ref 70–99)
GLUCOSE BLDC GLUCOMTR-MCNC: 235 MG/DL — HIGH (ref 70–99)
GLUCOSE SERPL-MCNC: 238 MG/DL — HIGH (ref 70–99)
GLUCOSE SERPL-MCNC: 257 MG/DL — HIGH (ref 70–99)
HCT VFR BLD CALC: 36 % — SIGNIFICANT CHANGE UP (ref 34.5–45)
HGB BLD-MCNC: 11.5 G/DL — SIGNIFICANT CHANGE UP (ref 11.5–15.5)
INR BLD: 1.15 RATIO — SIGNIFICANT CHANGE UP (ref 0.88–1.16)
MAGNESIUM SERPL-MCNC: 2 MG/DL — SIGNIFICANT CHANGE UP (ref 1.6–2.6)
MAGNESIUM SERPL-MCNC: 2 MG/DL — SIGNIFICANT CHANGE UP (ref 1.6–2.6)
MCHC RBC-ENTMCNC: 29.9 PG — SIGNIFICANT CHANGE UP (ref 27–34)
MCHC RBC-ENTMCNC: 31.9 GM/DL — LOW (ref 32–36)
MCV RBC AUTO: 93.8 FL — SIGNIFICANT CHANGE UP (ref 80–100)
NRBC # BLD: 0 /100 WBCS — SIGNIFICANT CHANGE UP (ref 0–0)
PHOSPHATE SERPL-MCNC: 2.4 MG/DL — LOW (ref 2.5–4.5)
PHOSPHATE SERPL-MCNC: 3.5 MG/DL — SIGNIFICANT CHANGE UP (ref 2.5–4.5)
PLATELET # BLD AUTO: 126 K/UL — LOW (ref 150–400)
POTASSIUM SERPL-MCNC: 3.4 MMOL/L — LOW (ref 3.5–5.3)
POTASSIUM SERPL-MCNC: 3.4 MMOL/L — LOW (ref 3.5–5.3)
POTASSIUM SERPL-SCNC: 3.4 MMOL/L — LOW (ref 3.5–5.3)
POTASSIUM SERPL-SCNC: 3.4 MMOL/L — LOW (ref 3.5–5.3)
PROTHROM AB SERPL-ACNC: 13.6 SEC — SIGNIFICANT CHANGE UP (ref 10.6–13.6)
RBC # BLD: 3.84 M/UL — SIGNIFICANT CHANGE UP (ref 3.8–5.2)
RBC # FLD: 14.2 % — SIGNIFICANT CHANGE UP (ref 10.3–14.5)
SODIUM SERPL-SCNC: 142 MMOL/L — SIGNIFICANT CHANGE UP (ref 135–145)
SODIUM SERPL-SCNC: 145 MMOL/L — SIGNIFICANT CHANGE UP (ref 135–145)
WBC # BLD: 8.55 K/UL — SIGNIFICANT CHANGE UP (ref 3.8–10.5)
WBC # FLD AUTO: 8.55 K/UL — SIGNIFICANT CHANGE UP (ref 3.8–10.5)

## 2020-08-29 PROCEDURE — 99291 CRITICAL CARE FIRST HOUR: CPT

## 2020-08-29 PROCEDURE — 99233 SBSQ HOSP IP/OBS HIGH 50: CPT | Mod: GC

## 2020-08-29 RX ORDER — CEFEPIME 1 G/1
1000 INJECTION, POWDER, FOR SOLUTION INTRAMUSCULAR; INTRAVENOUS ONCE
Refills: 0 | Status: COMPLETED | OUTPATIENT
Start: 2020-08-29 | End: 2020-08-29

## 2020-08-29 RX ORDER — POTASSIUM PHOSPHATE, MONOBASIC POTASSIUM PHOSPHATE, DIBASIC 236; 224 MG/ML; MG/ML
30 INJECTION, SOLUTION INTRAVENOUS ONCE
Refills: 0 | Status: COMPLETED | OUTPATIENT
Start: 2020-08-29 | End: 2020-08-29

## 2020-08-29 RX ORDER — POTASSIUM CHLORIDE 20 MEQ
30 PACKET (EA) ORAL ONCE
Refills: 0 | Status: COMPLETED | OUTPATIENT
Start: 2020-08-29 | End: 2020-08-29

## 2020-08-29 RX ORDER — LISINOPRIL 2.5 MG/1
40 TABLET ORAL DAILY
Refills: 0 | Status: DISCONTINUED | OUTPATIENT
Start: 2020-08-29 | End: 2020-08-31

## 2020-08-29 RX ORDER — POTASSIUM CHLORIDE 20 MEQ
10 PACKET (EA) ORAL
Refills: 0 | Status: COMPLETED | OUTPATIENT
Start: 2020-08-29 | End: 2020-08-29

## 2020-08-29 RX ORDER — LABETALOL HCL 100 MG
10 TABLET ORAL EVERY 6 HOURS
Refills: 0 | Status: DISCONTINUED | OUTPATIENT
Start: 2020-08-29 | End: 2020-09-02

## 2020-08-29 RX ORDER — CEFEPIME 1 G/1
1000 INJECTION, POWDER, FOR SOLUTION INTRAMUSCULAR; INTRAVENOUS EVERY 8 HOURS
Refills: 0 | Status: DISCONTINUED | OUTPATIENT
Start: 2020-08-29 | End: 2020-08-30

## 2020-08-29 RX ORDER — POLYETHYLENE GLYCOL 3350 17 G/17G
17 POWDER, FOR SOLUTION ORAL DAILY
Refills: 0 | Status: DISCONTINUED | OUTPATIENT
Start: 2020-08-29 | End: 2020-08-31

## 2020-08-29 RX ORDER — LISINOPRIL 2.5 MG/1
20 TABLET ORAL DAILY
Refills: 0 | Status: DISCONTINUED | OUTPATIENT
Start: 2020-08-29 | End: 2020-08-29

## 2020-08-29 RX ORDER — LABETALOL HCL 100 MG
10 TABLET ORAL ONCE
Refills: 0 | Status: COMPLETED | OUTPATIENT
Start: 2020-08-29 | End: 2020-08-29

## 2020-08-29 RX ORDER — ATORVASTATIN CALCIUM 80 MG/1
80 TABLET, FILM COATED ORAL AT BEDTIME
Refills: 0 | Status: DISCONTINUED | OUTPATIENT
Start: 2020-08-29 | End: 2020-08-31

## 2020-08-29 RX ORDER — CEFEPIME 1 G/1
INJECTION, POWDER, FOR SOLUTION INTRAMUSCULAR; INTRAVENOUS
Refills: 0 | Status: DISCONTINUED | OUTPATIENT
Start: 2020-08-29 | End: 2020-08-30

## 2020-08-29 RX ORDER — INSULIN GLARGINE 100 [IU]/ML
34 INJECTION, SOLUTION SUBCUTANEOUS AT BEDTIME
Refills: 0 | Status: DISCONTINUED | OUTPATIENT
Start: 2020-08-29 | End: 2020-08-30

## 2020-08-29 RX ORDER — LANOLIN ALCOHOL/MO/W.PET/CERES
3 CREAM (GRAM) TOPICAL AT BEDTIME
Refills: 0 | Status: DISCONTINUED | OUTPATIENT
Start: 2020-08-29 | End: 2020-08-31

## 2020-08-29 RX ORDER — NICARDIPINE HYDROCHLORIDE 30 MG/1
40 CAPSULE, EXTENDED RELEASE ORAL THREE TIMES A DAY
Refills: 0 | Status: DISCONTINUED | OUTPATIENT
Start: 2020-08-29 | End: 2020-09-02

## 2020-08-29 RX ADMIN — POTASSIUM PHOSPHATE, MONOBASIC POTASSIUM PHOSPHATE, DIBASIC 83.33 MILLIMOLE(S): 236; 224 INJECTION, SOLUTION INTRAVENOUS at 02:30

## 2020-08-29 RX ADMIN — Medication 4: at 05:39

## 2020-08-29 RX ADMIN — Medication 100 MILLIEQUIVALENT(S): at 16:49

## 2020-08-29 RX ADMIN — LATANOPROST 1 DROP(S): 0.05 SOLUTION/ DROPS OPHTHALMIC; TOPICAL at 21:04

## 2020-08-29 RX ADMIN — Medication 10 MILLIGRAM(S): at 09:48

## 2020-08-29 RX ADMIN — NICARDIPINE HYDROCHLORIDE 40 MILLIGRAM(S): 30 CAPSULE, EXTENDED RELEASE ORAL at 21:03

## 2020-08-29 RX ADMIN — Medication 81 MILLIGRAM(S): at 12:07

## 2020-08-29 RX ADMIN — Medication 100 MILLIEQUIVALENT(S): at 15:22

## 2020-08-29 RX ADMIN — NICARDIPINE HYDROCHLORIDE 40 MILLIGRAM(S): 30 CAPSULE, EXTENDED RELEASE ORAL at 13:29

## 2020-08-29 RX ADMIN — BRIMONIDINE TARTRATE 1 DROP(S): 2 SOLUTION/ DROPS OPHTHALMIC at 05:33

## 2020-08-29 RX ADMIN — Medication 2: at 12:07

## 2020-08-29 RX ADMIN — ATORVASTATIN CALCIUM 80 MILLIGRAM(S): 80 TABLET, FILM COATED ORAL at 21:04

## 2020-08-29 RX ADMIN — PANTOPRAZOLE SODIUM 40 MILLIGRAM(S): 20 TABLET, DELAYED RELEASE ORAL at 12:07

## 2020-08-29 RX ADMIN — POLYETHYLENE GLYCOL 3350 17 GRAM(S): 17 POWDER, FOR SOLUTION ORAL at 12:07

## 2020-08-29 RX ADMIN — ENOXAPARIN SODIUM 40 MILLIGRAM(S): 100 INJECTION SUBCUTANEOUS at 05:33

## 2020-08-29 RX ADMIN — Medication 100 MILLIEQUIVALENT(S): at 13:30

## 2020-08-29 RX ADMIN — CEFEPIME 100 MILLIGRAM(S): 1 INJECTION, POWDER, FOR SOLUTION INTRAMUSCULAR; INTRAVENOUS at 13:29

## 2020-08-29 RX ADMIN — Medication 30 MILLIEQUIVALENT(S): at 13:29

## 2020-08-29 RX ADMIN — LISINOPRIL 20 MILLIGRAM(S): 2.5 TABLET ORAL at 12:07

## 2020-08-29 RX ADMIN — LEVETIRACETAM 400 MILLIGRAM(S): 250 TABLET, FILM COATED ORAL at 18:12

## 2020-08-29 RX ADMIN — Medication 3 MILLIGRAM(S): at 21:07

## 2020-08-29 RX ADMIN — Medication 4: at 18:19

## 2020-08-29 RX ADMIN — NICARDIPINE HYDROCHLORIDE 40 MILLIGRAM(S): 30 CAPSULE, EXTENDED RELEASE ORAL at 05:30

## 2020-08-29 RX ADMIN — Medication 4: at 23:20

## 2020-08-29 RX ADMIN — LISINOPRIL 20 MILLIGRAM(S): 2.5 TABLET ORAL at 09:55

## 2020-08-29 RX ADMIN — Medication 100 MILLIGRAM(S): at 09:55

## 2020-08-29 RX ADMIN — CHLORHEXIDINE GLUCONATE 1 APPLICATION(S): 213 SOLUTION TOPICAL at 05:30

## 2020-08-29 RX ADMIN — INSULIN GLARGINE 34 UNIT(S): 100 INJECTION, SOLUTION SUBCUTANEOUS at 23:23

## 2020-08-29 RX ADMIN — ENOXAPARIN SODIUM 40 MILLIGRAM(S): 100 INJECTION SUBCUTANEOUS at 18:11

## 2020-08-29 RX ADMIN — LEVETIRACETAM 400 MILLIGRAM(S): 250 TABLET, FILM COATED ORAL at 05:40

## 2020-08-29 RX ADMIN — Medication 100 MILLIGRAM(S): at 21:03

## 2020-08-29 RX ADMIN — BRIMONIDINE TARTRATE 1 DROP(S): 2 SOLUTION/ DROPS OPHTHALMIC at 18:12

## 2020-08-29 RX ADMIN — CEFEPIME 100 MILLIGRAM(S): 1 INJECTION, POWDER, FOR SOLUTION INTRAMUSCULAR; INTRAVENOUS at 21:04

## 2020-08-29 RX ADMIN — Medication 10 MILLIGRAM(S): at 08:46

## 2020-08-29 RX ADMIN — CEFTRIAXONE 100 MILLIGRAM(S): 500 INJECTION, POWDER, FOR SOLUTION INTRAMUSCULAR; INTRAVENOUS at 05:29

## 2020-08-29 NOTE — EEG REPORT - NS EEG TEXT BOX
[facility]   COMPREHENSIVE EPILEPSY CENTER   REPORT OF LONG-TERM VIDEO EEG     [facility]: [address], [address], [place] [address], Ph#: [phone]  [facility]: [address] [place] [address], Ph#: [phone]  [facility]: [address] [place] [address], Ph#: [phone]    Patient Name: ANA M JOHNSON  Age and : 70y (48)  MRN #: [id number]  Location: [facility] 514 W1  Referring Physician: Maxwell Pappas    Start Time/Date: 11:20 on 20  End Time/Date: 08:00 on 20  Off EEG from 20:39    _____________________________________________________________  STUDY INFORMATION    EEG Recording Technique:  The patient underwent continuous Video-EEG monitoring, using Telemetry System hardware on the XLTek Digital System. EEG and video data were stored on a computer hard drive with important events saved in digital archive files. The material was reviewed by a physician (electroencephalographer / epileptologist) on a daily basis. Jamil and seizure detection algorithms were utilized and reviewed. An EEG Technician attended to the patient, and was available throughout daytime work hours.  The epilepsy center neurologist was available in person or on call 24-hours per day.    EEG Placement and Labeling of Electrodes:  The EEG was performed utilizing 20 channel referential EEG connections (coronal over temporal over parasagittal montage) using all standard 10-20 electrode placements with EKG, with additional electrodes placed in the inferior temporal region using the modified 10-10 montage electrode placements for elective admissions, or if deemed necessary. Recording was at a sampling rate of 256 samples per second per channel. Time synchronized digital video recording was done simultaneously with EEG recording. A low light infrared camera was used for low light recording.     _____________________________________________________________  HISTORY    Patient is a 70y old  Female who presents with a chief complaint of HHS, Seizure (27 Aug 2020 11:25)      PERTINENT MEDICATION:  levETIRAcetam  IVPB 1000 milliGRAM(s) IV Intermittent every 12 hours    _____________________________________________________________  STUDY INTERPRETATION    Findings: The background was continuous, spontaneously variable and reactive. During wakefulness, the posterior dominant rhythm consisted of fragments of 7 Hz activity, with amplitude to 30 uV, that attenuated to eye opening.     Background Slowing:  Diffuse theta and polymorphic delta slowing.    Focal Slowing:   None were present.    Sleep Background:  Drowsiness was characterized by fragmentation, attenuation, and slowing of the background activity.    Stage II sleep transients were not recorded.    Other Non-Epileptiform Findings:  None were present.    Interictal Epileptiform Activity:  None were present.    Events:  Clinical events: None recorded.  Seizures: None recorded.    Activation Procedures:   Hyperventilation was not performed.    Photic stimulation was not performed.     Artifacts:  Intermittent myogenic and movement artifacts were noted. Prominent eye fluttering seen.    ECG:  The heart rate on single channel ECG was predominantly between 70-80 BPM.    _____________________________________________________________  EEG SUMMARY/CLASSIFICATION    Abnormal EEG in an altered patient.  - Mild to moderate generalized slowing    _____________________________________________________________  EEG IMPRESSION/CLINICAL CORRELATE    Abnormal EEG study.  1. Mild to moderate  nonspecific diffuse or multifocal cerebral dysfunction.   2. No epileptiform pattern or seizure seen.    _____________________________________________________________    Ankit Mata MD PGY-5  Epilepsy Fellow    This Preliminary report is based on fellow review. Final report pending attending review. [facility]   COMPREHENSIVE EPILEPSY CENTER   REPORT OF LONG-TERM VIDEO EEG     [facility]: [address], [place] [address], Ph#: [phone]  [facility]: [address] [place] [address], Ph#: [phone]  [facility]: [address] [place] [address], Ph#: [phone]    Patient Name: ANA M JOHNSON  Age and : 70y (48)  MRN #: [id number]  Location: [facility] 514 W1  Referring Physician: Maxwell Pappas    Start Time/Date: 11:20 on 20  End Time/Date: 08:00 on 20  Off EEG from 20:39    _____________________________________________________________  STUDY INFORMATION    EEG Recording Technique:  The patient underwent continuous Video-EEG monitoring, using Telemetry System hardware on the XLTek Digital System. EEG and video data were stored on a computer hard drive with important events saved in digital archive files. The material was reviewed by a physician (electroencephalographer / epileptologist) on a daily basis. Jamil and seizure detection algorithms were utilized and reviewed. An EEG Technician attended to the patient, and was available throughout daytime work hours.  The epilepsy center neurologist was available in person or on call 24-hours per day.    EEG Placement and Labeling of Electrodes:  The EEG was performed utilizing 20 channel referential EEG connections (coronal over temporal over parasagittal montage) using all standard 10-20 electrode placements with EKG, with additional electrodes placed in the inferior temporal region using the modified 10-10 montage electrode placements for elective admissions, or if deemed necessary. Recording was at a sampling rate of 256 samples per second per channel. Time synchronized digital video recording was done simultaneously with EEG recording. A low light infrared camera was used for low light recording.     _____________________________________________________________  HISTORY    Patient is a 70y old  Female who presents with a chief complaint of HHS, Seizure (27 Aug 2020 11:25)      PERTINENT MEDICATION:  levETIRAcetam  IVPB 1000 milliGRAM(s) IV Intermittent every 12 hours    _____________________________________________________________  STUDY INTERPRETATION    Findings: The background was continuous, spontaneously variable and reactive. During wakefulness, the posterior dominant rhythm consisted of fragments of 7 Hz activity, with amplitude to 30 uV, that attenuated to eye opening.     Background Slowing:  Diffuse theta and polymorphic delta slowing.    Focal Slowing:   None were present.    Sleep Background:  Drowsiness was characterized by fragmentation, attenuation, and slowing of the background activity.    Stage II sleep transients were not recorded.    Other Non-Epileptiform Findings:  None were present.    Interictal Epileptiform Activity:  None were present.    Events:  Clinical events: None recorded.  Seizures: None recorded.    Activation Procedures:   Hyperventilation was not performed.    Photic stimulation was not performed.     Artifacts:  Intermittent myogenic and movement artifacts were noted. Prominent eye fluttering seen.    ECG:  The heart rate on single channel ECG was predominantly between 70-80 BPM.    _____________________________________________________________  EEG SUMMARY/CLASSIFICATION    Abnormal EEG in an altered patient.  - Mild to moderate generalized slowing    _____________________________________________________________  EEG IMPRESSION/CLINICAL CORRELATE    Abnormal EEG study.  1. Mild to moderate  nonspecific diffuse or multifocal cerebral dysfunction.   2. No epileptiform pattern or seizure seen.    _____________________________________________________________    Ankit Mata MD PGY-5  Epilepsy Fellow

## 2020-08-29 NOTE — PROGRESS NOTE ADULT - SUBJECTIVE AND OBJECTIVE BOX
CHIEF COMPLAINT:    Interval Events:    REVIEW OF SYSTEMS:  Constitutional: [ ] fevers [ ] chills [ ] weight loss [ ] weight gain  HEENT: [ ] dry eyes [ ] eye irritation [ ] postnasal drip [ ] nasal congestion  CV: [ ] chest pain [ ] orthopnea [ ] palpitations [ ] murmur  Resp: [ ] cough [ ] shortness of breath [ ] dyspnea [ ] wheezing [ ] sputum [ ] hemoptysis  GI: [ ] nausea [ ] vomiting [ ] diarrhea [ ] constipation [ ] abd pain [ ] dysphagia   : [ ] dysuria [ ] nocturia [ ] hematuria [ ] increased urinary frequency  Musculoskeletal: [ ] back pain [ ] myalgias [ ] arthralgias [ ] fracture  Skin: [ ] rash [ ] itch  Neurological: [ ] headache [ ] dizziness [ ] syncope [ ] weakness [ ] numbness  Psychiatric: [ ] anxiety [ ] depression  Endocrine: [ ] diabetes [ ] thyroid problem  Hematologic/Lymphatic: [ ] anemia [ ] bleeding problem  Allergic/Immunologic: [ ] itchy eyes [ ] nasal discharge [ ] hives [ ] angioedema  [ ] All other systems negative  [ ] Unable to assess ROS because ________    OBJECTIVE:  ICU Vital Signs Last 24 Hrs  T(C): 36.4 (29 Aug 2020 04:00), Max: 36.9 (28 Aug 2020 12:00)  T(F): 97.6 (29 Aug 2020 04:00), Max: 98.4 (28 Aug 2020 12:00)  HR: 81 (29 Aug 2020 05:00) (63 - 124)  BP: 188/88 (29 Aug 2020 05:00) (116/56 - 201/88)  BP(mean): 127 (29 Aug 2020 05:00) (76 - 134)  ABP: --  ABP(mean): --  RR: 43 (29 Aug 2020 05:00) (17 - 43)  SpO2: 98% (29 Aug 2020 05:00) (93% - 100%)      I & O's    08-27 @ 07:01  -  08-28 @ 07:00  --------------------------------------------------------  IN: 5122.5 mL / OUT: 1475 mL / NET: 3647.5 mL    08-28 @ 07:01  -  08-29 @ 06:27  --------------------------------------------------------  IN: 1930.5 mL / OUT: 1200 mL / NET: 730.5 mL      CAPILLARY BLOOD GLUCOSE  218 (28 Aug 2020 06:00)      POCT Blood Glucose.: 206 mg/dL (29 Aug 2020 05:34)      PHYSICAL EXAM:  General:   HEENT:   Lymph Nodes:  Neck:   Respiratory:   Cardiovascular:   Abdomen:   Extremities:   Skin:   Neurological:  Psychiatry:    LINES:    HOSPITAL MEDICATIONS:  MEDICATIONS  (STANDING):  allopurinol 100 milliGRAM(s) Oral every 12 hours  aspirin  chewable 81 milliGRAM(s) Oral daily  atorvastatin 80 milliGRAM(s) Oral at bedtime  brimonidine 0.2% Ophthalmic Solution 1 Drop(s) Both EYES two times a day  cefTRIAXone   IVPB 1000 milliGRAM(s) IV Intermittent every 24 hours  chlorhexidine 4% Liquid 1 Application(s) Topical <User Schedule>  dexMEDEtomidine Infusion 0.2 MICROgram(s)/kG/Hr (6.35 mL/Hr) IV Continuous <Continuous>  dextrose 5%. 1000 milliLiter(s) (50 mL/Hr) IV Continuous <Continuous>  dextrose 50% Injectable 12.5 Gram(s) IV Push once  dextrose 50% Injectable 25 Gram(s) IV Push once  dextrose 50% Injectable 25 Gram(s) IV Push once  enoxaparin Injectable 40 milliGRAM(s) SubCutaneous every 12 hours  insulin glargine Injectable (LANTUS) 32 Unit(s) SubCutaneous at bedtime  insulin lispro (HumaLOG) corrective regimen sliding scale   SubCutaneous every 6 hours  latanoprost 0.005% Ophthalmic Solution 1 Drop(s) Both EYES at bedtime  levETIRAcetam  IVPB 1000 milliGRAM(s) IV Intermittent every 12 hours  melatonin 3 milliGRAM(s) Oral at bedtime  niCARdipine 40 milliGRAM(s) Oral three times a day  niCARdipine Infusion 2.5 mG/Hr (12.5 mL/Hr) IV Continuous <Continuous>  pantoprazole  Injectable 40 milliGRAM(s) IV Push daily  polyethylene glycol 3350 17 Gram(s) Oral daily    MEDICATIONS  (PRN):  dextrose 40% Gel 15 Gram(s) Oral once PRN Blood Glucose LESS THAN 70 milliGRAM(s)/deciLiter  glucagon  Injectable 1 milliGRAM(s) IntraMuscular once PRN Glucose <70 milliGRAM(s)/deciLiter      LABS:                        11.5   8.55  )-----------( 126      ( 29 Aug 2020 00:38 )             36.0     Hgb Trend: 11.5<--, 12.3<--, 13.5<--, 12.9<--, 14.4<--  08-29    142  |  104  |  15  ----------------------------<  257<H>  3.4<L>   |  24  |  0.79    Ca    7.9<L>      29 Aug 2020 00:38  Phos  2.4     08-29  Mg     2.0     08-29      Creatinine Trend: 0.79<--, 0.80<--, 0.76<--, 0.80<--, 0.75<--, 0.74<--  PT/INR - ( 29 Aug 2020 00:38 )   PT: 13.6 sec;   INR: 1.15 ratio         PTT - ( 29 Aug 2020 00:38 )  PTT:29.0 sec    Arterial Blood Gas:  08-27 @ 08:25  7.49/42/94/32/98/7.6  ABG lactate: --        MICROBIOLOGY:     RADIOLOGY:  [ ] Reviewed and interpreted by me    EKG: CHIEF COMPLAINT:  Patient is a 70y old  Female who presents with a chief complaint of HHS, Seizure (27 Aug 2020 15:43)    HPI:  71 yo female with PMHx of HTN, BERKLEY, GERD, GIant cell arthritis, Gout presenting to [facility] ED 8/25 PM with 1 week c/o generalized weakness/dysuria/polyuria/polydipsia and RUE dystonia/hand twitching. Pt found to be in hypertensive urgency (/102) s/p total Labetalol 40 IVP in ED. Pt also found to be hyperglycemic to 796 with lactic acidosis started on insulin gtt for concern of HHS vs DKA with rapid correction of blood glucose. Course c/b by acute metabolic encephalopathy/generalized seizures on arrival to MICU requiring urgent intubation for airway protection, IV Keppra loading and sedative gtts for seizure control. CTH on admission possible meningioma.    Interval Events: Lantus was increased 32U      REVIEW OF SYSTEMS:  Constitutional: [ ] fevers [ ] chills [ ] weight loss [ ] weight gain  HEENT: [ ] dry eyes [ ] eye irritation [ ] postnasal drip [ ] nasal congestion  CV: [ ] chest pain [ ] orthopnea [ ] palpitations [ ] murmur  Resp: [ ] cough [ ] shortness of breath [ ] dyspnea [ ] wheezing [ ] sputum [ ] hemoptysis  GI: [ ] nausea [ ] vomiting [ ] diarrhea [ ] constipation [ ] abd pain [ ] dysphagia   : [ ] dysuria [ ] nocturia [ ] hematuria [ ] increased urinary frequency  Musculoskeletal: [ ] back pain [ ] myalgias [ ] arthralgias [ ] fracture  Skin: [ ] rash [ ] itch  Neurological: [ ] headache [ ] dizziness [ ] syncope [ ] weakness [ ] numbness  Psychiatric: [ ] anxiety [ ] depression  Endocrine: [ ] diabetes [ ] thyroid problem  Hematologic/Lymphatic: [ ] anemia [ ] bleeding problem  Allergic/Immunologic: [ ] itchy eyes [ ] nasal discharge [ ] hives [ ] angioedema  [ ] All other systems negative  [ ] Unable to assess ROS because ________    OBJECTIVE:  ICU Vital Signs Last 24 Hrs  T(C): 36.4 (29 Aug 2020 04:00), Max: 36.9 (28 Aug 2020 12:00)  T(F): 97.6 (29 Aug 2020 04:00), Max: 98.4 (28 Aug 2020 12:00)  HR: 81 (29 Aug 2020 05:00) (63 - 124)  BP: 188/88 (29 Aug 2020 05:00) (116/56 - 201/88)  BP(mean): 127 (29 Aug 2020 05:00) (76 - 134)  ABP: --  ABP(mean): --  RR: 43 (29 Aug 2020 05:00) (17 - 43)  SpO2: 98% (29 Aug 2020 05:00) (93% - 100%)      I & O's    08-27 @ 07:01  -  08-28 @ 07:00  --------------------------------------------------------  IN: 5122.5 mL / OUT: 1475 mL / NET: 3647.5 mL    08-28 @ 07:01  -  08-29 @ 06:27  --------------------------------------------------------  IN: 1930.5 mL / OUT: 1200 mL / NET: 730.5 mL      CAPILLARY BLOOD GLUCOSE  218 (28 Aug 2020 06:00)      POCT Blood Glucose.: 206 mg/dL (29 Aug 2020 05:34)      PHYSICAL EXAM:  General:   HEENT:   Lymph Nodes:  Neck:   Respiratory:   Cardiovascular:   Abdomen:   Extremities:   Skin:   Neurological:  Psychiatry:    LINES:    HOSPITAL MEDICATIONS:  MEDICATIONS  (STANDING):  allopurinol 100 milliGRAM(s) Oral every 12 hours  aspirin  chewable 81 milliGRAM(s) Oral daily  atorvastatin 80 milliGRAM(s) Oral at bedtime  brimonidine 0.2% Ophthalmic Solution 1 Drop(s) Both EYES two times a day  cefTRIAXone   IVPB 1000 milliGRAM(s) IV Intermittent every 24 hours  chlorhexidine 4% Liquid 1 Application(s) Topical <User Schedule>  dexMEDEtomidine Infusion 0.2 MICROgram(s)/kG/Hr (6.35 mL/Hr) IV Continuous <Continuous>  dextrose 5%. 1000 milliLiter(s) (50 mL/Hr) IV Continuous <Continuous>  dextrose 50% Injectable 12.5 Gram(s) IV Push once  dextrose 50% Injectable 25 Gram(s) IV Push once  dextrose 50% Injectable 25 Gram(s) IV Push once  enoxaparin Injectable 40 milliGRAM(s) SubCutaneous every 12 hours  insulin glargine Injectable (LANTUS) 32 Unit(s) SubCutaneous at bedtime  insulin lispro (HumaLOG) corrective regimen sliding scale   SubCutaneous every 6 hours  latanoprost 0.005% Ophthalmic Solution 1 Drop(s) Both EYES at bedtime  levETIRAcetam  IVPB 1000 milliGRAM(s) IV Intermittent every 12 hours  melatonin 3 milliGRAM(s) Oral at bedtime  niCARdipine 40 milliGRAM(s) Oral three times a day  niCARdipine Infusion 2.5 mG/Hr (12.5 mL/Hr) IV Continuous <Continuous>  pantoprazole  Injectable 40 milliGRAM(s) IV Push daily  polyethylene glycol 3350 17 Gram(s) Oral daily    MEDICATIONS  (PRN):  dextrose 40% Gel 15 Gram(s) Oral once PRN Blood Glucose LESS THAN 70 milliGRAM(s)/deciLiter  glucagon  Injectable 1 milliGRAM(s) IntraMuscular once PRN Glucose <70 milliGRAM(s)/deciLiter      LABS:                        11.5   8.55  )-----------( 126      ( 29 Aug 2020 00:38 )             36.0     Hgb Trend: 11.5<--, 12.3<--, 13.5<--, 12.9<--, 14.4<--  08-29    142  |  104  |  15  ----------------------------<  257<H>  3.4<L>   |  24  |  0.79    Ca    7.9<L>      29 Aug 2020 00:38  Phos  2.4     08-29  Mg     2.0     08-29      Creatinine Trend: 0.79<--, 0.80<--, 0.76<--, 0.80<--, 0.75<--, 0.74<--  PT/INR - ( 29 Aug 2020 00:38 )   PT: 13.6 sec;   INR: 1.15 ratio         PTT - ( 29 Aug 2020 00:38 )  PTT:29.0 sec    Arterial Blood Gas:  08-27 @ 08:25  7.49/42/94/32/98/7.6  ABG lactate: --        MICROBIOLOGY:   Culture - Sputum . (08.26.20 @ 17:11)    -  Gentamicin: S 4    -  Imipenem: S <=1    -  Levofloxacin: S <=0.5    -  Meropenem: S <=1    -  Piperacillin/Tazobactam: S <=8    -  Tobramycin: S <=2    Gram Stain:   Few polymorphonuclear leukocytes per low power field  No Squamous epithelial cells per low power field  Few Gram Negative Rods per oil power field    -  Amikacin: S <=16    -  Aztreonam: S <=4    -  Cefepime: S <=2    -  Ceftazidime: S 4    -  Ciprofloxacin: S <=0.25    Specimen Source: .Sputum Sputum    Culture Results:   Rare Pseudomonas aeruginosa  Normal Respiratory Carolyne present    Organism Identification: Pseudomonas aeruginosa    Organism: Pseudomonas aeruginosa    Method Type: RADHA      RADIOLOGY:  < from: CT Head No Cont (08.26.20 @ 01:18) >  EXAM:  CT ANGIO BRAIN (W)AW IC                        EXAM:  CT BRAIN                        EXAM:  CT ANGIO NECK (W)AW IC                        PROCEDURE DATE:  08/26/2020    INTERPRETATION:  CLINICAL INFORMATION: Focal right upper extremity tremor    TECHNIQUE:  1.  Noncontrast head CT scan was performed reconstructed into 5 mm thick axial slices.  2.  Contrast enhanced CT angiography of the neck and head was performed.   MIP reformats were generated.    Intravenous contrast:90 cc of Omnipaque-350 mg/ml were administered, and 10 cc were discarded.    COMPARISON STUDY: None.    FINDINGS:  NONCONTRAST HEAD CT SCAN:  There is no CT evidence of acute intracranial hemorrhage, mass effect or midline shift.  Gray matter-white matter differentiation is grossly preserved.    The ventricles, sulci and extra-axial spaces appear within normal limits.    The paranasal sinuses and tympanomastoid cavities are grossly clear.    There is a 1.3 cm lobulated bony density seen posteriorto the to the left IAC in the left CP angle, nonspecific, may represent meningioma. Further evaluation with MRI can be obtained..    CT ANGIOGRAPHY NECK:  Thoracic aorta and branch vessels: Three vessel arch.  No occlusion, flow limiting stenosis or dissection.    Right carotid system: No occlusion, hemodynamically significant carotid stenosis using NASCET criteria or dissection.    Left carotid system: No occlusion, hemodynamically significant carotid stenosis using NASCET criteria or dissection.    Vertebral arteries: No occlusion, flow-limiting stenosis or dissection.  Right dominant vertebral artery; hypoplastic left vertebral artery ends in a left PICA distribution..    Soft tissues of the neck: Within normal limits.    Visualized spine:Degenerative disease..    Visualized upper chest:  Within normal limits    CT ANGIOGRAPHY BRAIN:  Internal carotid arteries: No occlusion, flow-limiting stenosis or aneurysm.    Anterior cerebral arteries: No occlusion, flow-limiting stenosis or aneurysm    Middle cerebral arteries: No occlusion, flow-limiting stenosis or aneurysm.    Anterior communicating artery: Patent without aneurysm.    Posterior communicating arteries: Patent bilaterally without aneurysm.    Posterior cerebral arteries: Noocclusion, flow-limiting stenosis or aneurysm.    Vertebrobasilar: No occlusion, flow-limiting stenosis or aneurysm.  The distal vertebral arteries are codominant.  Posterior inferior cerebellar arteries, anterior inferior cerebellar arteries and superior cerebellar arteries are patent bilaterally.    IMPRESSION:  NONCONTRAST HEAD CT SCAN:  1. No CT evidence of acute intracranial hemorrhage, mass effect or acute territorial infarct.  2. There is a 1.3 cm lobulated bony density seen posterior to the to the left IAC in the left CP angle, nonspecific, may represent meningioma. Further evaluation with MRI can be obtained..    CT ANGIOGRAPHY NECK: Patent cervical vasculature.  No hemodynamically significant carotid stenosis or flow-limiting vertebral artery stenosis.  No evidence of dissection.    CT ANGIOGRAPHY BRAIN: No vessel occlusion, flow-limiting stenosis or aneurysm is identified about the [place].        < from: Transthoracic Echocardiogram (08.26.20 @ 08:32) >  ------------------------------------------------------------------------  Dimensions:    Normal Values:  LA:     3.5    2.0 - 4.0 cm  Ao:     3.0    2.0 - 3.8 cm  SEPTUM: 1.4    0.6 - 1.2 cm  PWT:    1.3    0.6 - 1.1 cm  LVIDd:  4.0    3.0 - 5.6 cm  LVIDs:  2.4    1.8 - 4.0 cm  EF (Visual Estimate): 65-70 %  Doppler Peak Velocity (m/sec): AoV=1.6 PV=1.1  ------------------------------------------------------------------------  Observations:  Mitral Valve: Mitral annular calcification. Mild mitral  regurgitation.  Aortic Valve/Aorta: Tricuspid aortic valve with normal cusp  excursion. Peak transaortic valve gradient equals 10 mm Hg.  No aortic valve regurgitation seen.  Aortic Root: 3 cm.  Left Atrium: Normal left atrial size.  Left Ventricle: Normal overall left ventricular systolic  function with an estimated ejection fraction of 65-70%. No  overt segmental wall motion abnormalities. The apical  cavity obliterates in systole. Mildly increased left  ventricular wall thickness.  Right Heart: Right atrium not well visualized. The right  ventricle is not well visualized; overall right ventricular  systolic function appears preserved based on the views  obtained. Tricuspid valve structure appears normal. Minimal  tricuspid regurgitation. No pulmonic stenosis. Minimal  pulmonic regurgitation.  Pericardium/Pleura: No pericardial effusion seen.  ------------------------------------------------------------------------  Conclusions:  Suboptimal image quality.  1. Mildly increased left ventricular wall thickness.  2. Normal overall left ventricular systolic function with  an estimated ejection fraction of 65-70%. No overt  segmental wall motion abnormalities. The apical cavity  obliterates in systole.  3. The right ventricle is not well visualized; overall  right ventricular systolic function appears preserved based  on the views obtained. CHIEF COMPLAINT:  Patient is a 70y old  Female who presents with a chief complaint of HHS, Seizure (27 Aug 2020 15:43)    HPI:  71 yo female with PMHx of HTN, BERKLEY, GERD, GIant cell arthritis, Gout presenting to [facility] ED 8/25 PM with 1 week c/o generalized weakness/dysuria/polyuria/polydipsia and RUE dystonia/hand twitching. Pt found to be in hypertensive urgency (/102) s/p total Labetalol 40 IVP in ED. Pt also found to be hyperglycemic to 796 with lactic acidosis started on insulin gtt for concern of HHS vs DKA with rapid correction of blood glucose. Course c/b by acute metabolic encephalopathy/generalized seizures on arrival to MICU requiring urgent intubation for airway protection s/p extubation 8/27, s/p IV Keppra loaded. s/p versed gtt. and sedative gtts  CT of the brain concern for  a possible meningioma.    Interval Events: Lantus was increased 32U      REVIEW OF SYSTEMS:  Constitutional: [ ] fevers [ ] chills [ ] weight loss [ ] weight gain-(x)denies  HEENT: [ ] dry eyes [ ] eye irritation [ ] postnasal drip [ ] nasal congestion (x)denies  CV: [ ] chest pain [ ] orthopnea [ ] palpitations [ ] murmur (x)denies  Resp: [ ] cough [ ] shortness of breath [ ] dyspnea [ ] wheezing [ ] sputum [ ] hemoptysis (x)denies  GI: [ ] nausea [ ] vomiting [ ] diarrhea [ ] constipation [ ] abd pain [ ] dysphagia  (x)denies  : [ ] dysuria [ ] nocturia [ ] hematuria [ ] increased urinary frequency (x)denies  Musculoskeletal: [ ] back pain [ ] myalgias [ ] arthralgias [ ] fracture (x)denies  Skin: [ ] rash [ ] itch (x)denies  Neurological: [ ] headache [ ] dizziness [ ] syncope [ ] weakness [ ] numbness(x)denies  Psychiatric: [ ] anxiety [x ] depression  Endocrine: [ ] diabetes [ ] thyroid problem(x)denies  Hematologic/Lymphatic: [ ] anemia [ ] bleeding problem (x)denies  Allergic/Immunologic: [ ] itchy eyes [ ] nasal discharge [ ] hives [ ] angioedema (x)denies  [x ] All other systems negative    OBJECTIVE:  ICU Vital Signs Last 24 Hrs  T(C): 36.4 (29 Aug 2020 04:00), Max: 36.9 (28 Aug 2020 12:00)  T(F): 97.6 (29 Aug 2020 04:00), Max: 98.4 (28 Aug 2020 12:00)  HR: 81 (29 Aug 2020 05:00) (63 - 124)  BP: 188/88 (29 Aug 2020 05:00) (116/56 - 201/88)  BP(mean): 127 (29 Aug 2020 05:00) (76 - 134)  ABP: --  ABP(mean): --  RR: 43 (29 Aug 2020 05:00) (17 - 43)  SpO2: 98% (29 Aug 2020 05:00) (93% - 100%)      I & O's    08-27 @ 07:01  -  08-28 @ 07:00  --------------------------------------------------------  IN: 5122.5 mL / OUT: 1475 mL / NET: 3647.5 mL    08-28 @ 07:01  -  08-29 @ 06:27  --------------------------------------------------------  IN: 1930.5 mL / OUT: 1200 mL / NET: 730.5 mL      CAPILLARY BLOOD GLUCOSE  218 (28 Aug 2020 06:00)      POCT Blood Glucose.: 206 mg/dL (29 Aug 2020 05:34)      PHYSICAL EXAM:  General: appears calm and w/o c/o pain  HEENT: sclera clear, trachea midline  Lymph Nodes: non palp  Neck: supple  Respiratory: bilaterally equal BS / BCTA  Cardiovascular: S1S2 RRR, all pulses palp 1+/3- limited by body habitus  Abdomen: obese, soft, non distended, +distant BS hypoactive  Extremities: edema - non pitting x 4  Skin: warm, dry  Neurological: +PERRL, neg nystagmus, speech is clear, following all commands appropriately, bilaterally equal strength and mobility x 4  Psychiatry: unable to assess    LINES: [facility] MEDICATIONS:  MEDICATIONS  (STANDING):  allopurinol 100 milliGRAM(s) Oral every 12 hours  aspirin  chewable 81 milliGRAM(s) Oral daily  atorvastatin 80 milliGRAM(s) Oral at bedtime  brimonidine 0.2% Ophthalmic Solution 1 Drop(s) Both EYES two times a day  cefTRIAXone   IVPB 1000 milliGRAM(s) IV Intermittent every 24 hours  chlorhexidine 4% Liquid 1 Application(s) Topical <User Schedule>  dexMEDEtomidine Infusion 0.2 MICROgram(s)/kG/Hr (6.35 mL/Hr) IV Continuous <Continuous>  dextrose 5%. 1000 milliLiter(s) (50 mL/Hr) IV Continuous <Continuous>  dextrose 50% Injectable 12.5 Gram(s) IV Push once  dextrose 50% Injectable 25 Gram(s) IV Push once  dextrose 50% Injectable 25 Gram(s) IV Push once  enoxaparin Injectable 40 milliGRAM(s) SubCutaneous every 12 hours  insulin glargine Injectable (LANTUS) 32 Unit(s) SubCutaneous at bedtime  insulin lispro (HumaLOG) corrective regimen sliding scale   SubCutaneous every 6 hours  latanoprost 0.005% Ophthalmic Solution 1 Drop(s) Both EYES at bedtime  levETIRAcetam  IVPB 1000 milliGRAM(s) IV Intermittent every 12 hours  melatonin 3 milliGRAM(s) Oral at bedtime  niCARdipine 40 milliGRAM(s) Oral three times a day  niCARdipine Infusion 2.5 mG/Hr (12.5 mL/Hr) IV Continuous <Continuous>  pantoprazole  Injectable 40 milliGRAM(s) IV Push daily  polyethylene glycol 3350 17 Gram(s) Oral daily    MEDICATIONS  (PRN):  dextrose 40% Gel 15 Gram(s) Oral once PRN Blood Glucose LESS THAN 70 milliGRAM(s)/deciLiter  glucagon  Injectable 1 milliGRAM(s) IntraMuscular once PRN Glucose <70 milliGRAM(s)/deciLiter      LABS:                        11.5   8.55  )-----------( 126      ( 29 Aug 2020 00:38 )             36.0     Hgb Trend: 11.5<--, 12.3<--, 13.5<--, 12.9<--, 14.4<--  08-29    142  |  104  |  15  ----------------------------<  257<H>  3.4<L>   |  24  |  0.79    Ca    7.9<L>      29 Aug 2020 00:38  Phos  2.4     08-29  Mg     2.0     08-29      Creatinine Trend: 0.79<--, 0.80<--, 0.76<--, 0.80<--, 0.75<--, 0.74<--  PT/INR - ( 29 Aug 2020 00:38 )   PT: 13.6 sec;   INR: 1.15 ratio         PTT - ( 29 Aug 2020 00:38 )  PTT:29.0 sec    Arterial Blood Gas:  08-27 @ 08:25  7.49/42/94/32/98/7.6  ABG lactate: --        MICROBIOLOGY:   Culture - Sputum . (08.26.20 @ 17:11)    -  Gentamicin: S 4    -  Imipenem: S <=1    -  Levofloxacin: S <=0.5    -  Meropenem: S <=1    -  Piperacillin/Tazobactam: S <=8    -  Tobramycin: S <=2    Gram Stain:   Few polymorphonuclear leukocytes per low power field  No Squamous epithelial cells per low power field  Few Gram Negative Rods per oil power field    -  Amikacin: S <=16    -  Aztreonam: S <=4    -  Cefepime: S <=2    -  Ceftazidime: S 4    -  Ciprofloxacin: S <=0.25    Specimen Source: .Sputum Sputum    Culture Results:   Rare Pseudomonas aeruginosa  Normal Respiratory Carolyne present    Organism Identification: Pseudomonas aeruginosa    Organism: Pseudomonas aeruginosa    Method Type: RADHA      RADIOLOGY:  < from: CT Head No Cont (08.26.20 @ 01:18) >  EXAM:  CT ANGIO BRAIN (W)AW IC                        EXAM:  CT BRAIN                        EXAM:  CT ANGIO NECK (W)AW IC                        PROCEDURE DATE:  08/26/2020    INTERPRETATION:  CLINICAL INFORMATION: Focal right upper extremity tremor  TECHNIQUE:  1.  Noncontrast head CT scan was performed reconstructed into 5 mm thick axial slices.  2.  Contrast enhanced CT angiography of the neck and head was performed.   MIP reformats were generated.  Intravenous contrast:90 cc of Omnipaque-350 mg/ml were administered, and 10 cc were discarded.  COMPARISON STUDY: None.  FINDINGS:  NONCONTRAST HEAD CT SCAN:  There is no CT evidence of acute intracranial hemorrhage, mass effect or midline shift.  Gray matter-white matter differentiation is grossly preserved.  The ventricles, sulci and extra-axial spaces appear within normal limits.  The paranasal sinuses and tympanomastoid cavities are grossly clear.  There is a 1.3 cm lobulated bony density seen posteriorto the to the left IAC in the left CP angle, nonspecific, may represent meningioma. Further evaluation with MRI can be obtained..    CT ANGIOGRAPHY NECK:  Thoracic aorta and branch vessels: Three vessel arch.  No occlusion, flow limiting stenosis or dissection.    Right carotid system: No occlusion, hemodynamically significant carotid stenosis using NASCET criteria or dissection.    Left carotid system: No occlusion, hemodynamically significant carotid stenosis using NASCET criteria or dissection.    Vertebral arteries: No occlusion, flow-limiting stenosis or dissection.  Right dominant vertebral artery; hypoplastic left vertebral artery ends in a left PICA distribution..    Soft tissues of the neck: Within normal limits.    Visualized spine:Degenerative disease..    Visualized upper chest:  Within normal limits    CT ANGIOGRAPHY BRAIN:  Internal carotid arteries: No occlusion, flow-limiting stenosis or aneurysm.    Anterior cerebral arteries: No occlusion, flow-limiting stenosis or aneurysm    Middle cerebral arteries: No occlusion, flow-limiting stenosis or aneurysm.    Anterior communicating artery: Patent without aneurysm.    Posterior communicating arteries: Patent bilaterally without aneurysm.    Posterior cerebral arteries: Noocclusion, flow-limiting stenosis or aneurysm.    Vertebrobasilar: No occlusion, flow-limiting stenosis or aneurysm.  The distal vertebral arteries are codominant.  Posterior inferior cerebellar arteries, anterior inferior cerebellar arteries and superior cerebellar arteries are patent bilaterally.    IMPRESSION:  NONCONTRAST HEAD CT SCAN:  1. No CT evidence of acute intracranial hemorrhage, mass effect or acute territorial infarct.  2. There is a 1.3 cm lobulated bony density seen posterior to the to the left IAC in the left CP angle, nonspecific, may represent meningioma. Further evaluation with MRI can be obtained..    CT ANGIOGRAPHY NECK: Patent cervical vasculature.  No hemodynamically significant carotid stenosis or flow-limiting vertebral artery stenosis.  No evidence of dissection.    CT ANGIOGRAPHY BRAIN: No vessel occlusion, flow-limiting stenosis or aneurysm is identified about the [place].      ECHO  ------------------------------------------------------------------------  Dimensions:    Normal Values:  LA:     3.5    2.0 - 4.0 cm  Ao:     3.0    2.0 - 3.8 cm  SEPTUM: 1.4    0.6 - 1.2 cm  PWT:    1.3    0.6 - 1.1 cm  LVIDd:  4.0    3.0 - 5.6 cm  LVIDs:  2.4    1.8 - 4.0 cm  EF (Visual Estimate): 65-70 %  Doppler Peak Velocity (m/sec): AoV=1.6 PV=1.1  ------------------------------------------------------------------------  Observations:  Mitral Valve: Mitral annular calcification. Mild mitral  regurgitation.  Aortic Valve/Aorta: Tricuspid aortic valve with normal cusp  excursion. Peak transaortic valve gradient equals 10 mm Hg.  No aortic valve regurgitation seen.  Aortic Root: 3 cm.  Left Atrium: Normal left atrial size.  Left Ventricle: Normal overall left ventricular systolic  function with an estimated ejection fraction of 65-70%. No  overt segmental wall motion abnormalities. The apical  cavity obliterates in systole. Mildly increased left  ventricular wall thickness.  Right Heart: Right atrium not well visualized. The right  ventricle is not well visualized; overall right ventricular  systolic function appears preserved based on the views  obtained. Tricuspid valve structure appears normal. Minimal  tricuspid regurgitation. No pulmonic stenosis. Minimal  pulmonic regurgitation.  Pericardium/Pleura: No pericardial effusion seen.  ------------------------------------------------------------------------  Conclusions:  Suboptimal image quality.  1. Mildly increased left ventricular wall thickness.  2. Normal overall left ventricular systolic function with  an estimated ejection fraction of 65-70%. No overt  segmental wall motion abnormalities. The apical cavity  obliterates in systole.  3. The right ventricle is not well visualized; overall  right ventricular systolic function appears preserved based  on the views obtained.

## 2020-08-29 NOTE — PROGRESS NOTE ADULT - ASSESSMENT
Assessment: 69 yo female with PMHx of HTN, BERKLEY, GERD, Giant cell Arthritis, Gout presents to [facility] 8/25 with c/o dysuria/polyuria/polydipsia, RUE tremors/dystonia x about 1 week found to be in hypertensive urgency to SBP 200s and Hyperglycemic Hyperosmolar State () vs less likely DKA and Lactic Acidosis accepted to MICU for further management c/b generalized seizure/AMS of unclear etiology, most likely d/t rapid correction of blood sugar and osmolar shift requiring intubation for airway protection.    Plan:    #Neuro: Generalized seizure/Encephalopathy of unclear etiology? thought to be d/t rapid correction of blood sugar with osmolar shift vs new loculated density in the L IAC concerning for meningioma awaiting MRI brain  - no h/o seizure reported- new seizure on this admission c/w Keppra 1gm/q12H  -f/u Neuro  -c/w 24H EEG (s/p versed infusion)  -c/w ASA / start Statin  - s/p extubation 8/27, AAOx1, agitated and restless, intermittently following commands and answering some questions yes/no, still requiring small dose of precedex gtt at this time, will attempt to d/c today    #Pulm: Acute hypoxemic Respiratory Failure s/p intubation for airway protection i/s/o AMS/generalized seizure activity s/p extubation 8/27  -c/w NC- maintain O2 sat >92%  - aspiration precautions    #CV: Hypertensive urgency () s/p total Labetalol 40 IV on admission s/p cardene gtt off since yesterday bridged over to nicardipine PO  HTN episode - given Labetolol 10mg x 1 IV-   -consider restarting pt home dose of lisinopril at home (40mg qd- start at 20mg)  - f/u TTE    #GI/: No active issues  - dysuria/polydipsia i/s/o HHS vs DKA  - NPO status, NGT placed however unable to feed at this time d/t risk of aspiration   - consider speech/swallow extubation as mentation improves   - will d/c IVF as pt becoming net negative   - PPI qdaily    #ID: Afebrile, no leukocytosis at presentation   - started on Ceftriaxone 2g qd for empiric meningitis coverage on admission   - de-escalated to Ceftriaxone 1g qd  for empiric aspiration PNA coverage and f/u culture results (Day #4 of Abx today 8/29)  -sputum cx + g- rods / Pseudomonas Aerogenous-  Consider starting cefepime      #FEN/ENDO: Hyperglycemic Hyperosmolar State vs DKA s/p insulin gtt transitioned to HISS and lantus  -c/w HISS  -c/w Lanuts    #HEME:  No active issues  - DVT ppx with Lovenox 40q12 Assessment: 69 yo female with PMHx of HTN, BERKLEY, GERD, Giant cell Arthritis, Gout presents to [facility] 8/25 with c/o dysuria/polyuria/polydipsia, RUE tremors/dystonia x about 1 week found to be in hypertensive urgency to SBP 200s and Hyperglycemic Hyperosmolar State () vs less likely DKA and Lactic Acidosis accepted to MICU for further management c/b generalized seizure/AMS of unclear etiology, possibly d/t rapid correction of blood sugar and osmolar shift requiring intubation for airway protection vs/and cerebral density noted on CT of the head awaiting MRI    Plan:    #Neuro: Generalized seizure/Encephalopathy of unclear etiology? thought to be d/t rapid correction of blood sugar with osmolar shift vs new loculated density in the L IAC concerning for meningioma awaiting MRI brain  - no h/o seizure reported- new seizure on this admission c/w Keppra 1gm/q12H  -f/u Neuro  -c/w 24H EEG (s/p versed infusion)  -c/w ASA / start Statin  - s/p extubation 8/27, AAOx1, agitated and restless, intermittently following commands and answering some questions yes/no, still requiring small dose of precedex gtt at this time, will attempt to d/c today    #Pulm: Acute hypoxemic Respiratory Failure s/p intubation for airway protection i/s/o AMS/generalized seizure activity s/p extubation 8/27  -c/w NC- maintain O2 sat >92%  - aspiration precautions    #CV: Hypertensive urgency () s/p total Labetalol 40 IV on admission s/p cardene gtt off since yesterday bridged over to nicardipine PO  HTN episode - given Labetolol 10mg x 1 IV-   -consider restarting pt home dose of lisinopril at home (40mg qd- start at 20mg)  - f/u TTE    #GI/: No active issues  - dysuria/polydipsia i/s/o HHS vs DKA  - NPO status, NGT placed however unable to feed at this time d/t risk of aspiration   - consider speech/swallow extubation as mentation improves   - will d/c IVF as pt becoming net negative   - PPI qdaily    #ID: Afebrile, no leukocytosis at presentation   - started on Ceftriaxone 2g qd for empiric meningitis coverage on admission   - de-escalated to Ceftriaxone 1g qd  for empiric aspiration PNA coverage and f/u culture results (Day #4 of Abx today 8/29)  -sputum cx + g- rods / Pseudomonas Aerogenous-  Consider starting cefepime      #FEN/ENDO: Hyperglycemic Hyperosmolar State vs DKA s/p insulin gtt transitioned to HISS and lantus  -c/w HISS  -c/w Lanuts    #HEME:  No active issues  - DVT ppx with Lovenox 40q12 Assessment: 71 yo female with PMHx of HTN, BERKLEY, GERD, Giant cell Arthritis, Gout presents to [facility] 8/25 with c/o dysuria/polyuria/polydipsia, RUE tremors/dystonia x about 1 week found to be in hypertensive urgency to SBP 200s and Hyperglycemic Hyperosmolar State () vs less likely DKA and Lactic Acidosis accepted to MICU for further management c/b generalized seizure/AMS of unclear etiology, possibly d/t rapid correction of blood sugar and osmolar shift requiring intubation for airway protection vs/and cerebral density noted on CT of the head awaiting MRI    Plan:    #Neuro: Generalized seizure/Encephalopathy of unclear etiology? thought to be d/t rapid correction of blood sugar with osmolar shift vs new loculated density in the L IAC concerning for meningioma awaiting MRI brain  - no h/o seizure reported- new seizure on this admission  - c/w Keppra 1gm/q12H  -f/u Neuro  -d/c 24H EEG  -c/w ASA   -consider starting a  Statin    #Pulm: Acute hypoxemic Respiratory Failure s/p intubation for airway protection 2/2 AMS / encephalopathy /generalized seizure activity s/p extubation 8/27  -c/w NC- maintain O2 sat >92%- tolerating RA  - aspiration precautions  -Incentive spirometry    #CV: A/W Hypertensive urgency () s/p total Labetalol 40 IV on admission s/p cardene gtt off since yesterday bridged over to nicardipine PO  HTN episode - given Labetolol 10mg x 1 IV-   -c/w Labetalol PRN- 10mg IVP w paratmeters  -restart pt home dose lisinopril      #GI/: No active issues  - pt has been NPO- keofeed in place- start  TF- trickle feeds for today advance as tolerated  - speech/swallow eval  - c/w PPI daily    #ID: Afebrile, no leukocytosis at presentation   - started on Ceftriaxone 2g qd for empiric meningitis coverage on admission s/p de-escalation to 1mg   -+pseudomonas A. sputum- d/c ceftriaxone- start cefepime - 5day coure    #FEN/ENDO: Hyperglycemic Hyperosmolar State vs DKA / HHNK s/p insulin gtt transitioned to HISS and lantus  -c/w HISS  -c/w Lantus     #HEME:  No active issues  - DVT ppx with Lovenox 40q12

## 2020-08-29 NOTE — PROGRESS NOTE ADULT - ASSESSMENT
71 y/o F with history of HTN, Gout, obesity, presenting w/ b/l ankle swelling, fatigue/sleepiness and generalized weakness x several days. Found to have serum BG of 796, AG 22, bicarb 21, BHB2.3, lactate 9, WBC 10.88. UA negative for UTI. CXR showed clear lungs. CTH showing possible meningioma. Given 2L IVF bolus and  admitted to MICU where she was started on insulin drip and IVF. Dextrose added to IVF at ~4pm on 8/26 and stopped early this morning 8/72. CCB seizure while in MICU possibly due to rapid correction of BG, intubated for airway protection. Endocrinology consulted for concern for DKA/HHS in the setting of newly diagnosed DM (high risk patient with DKA with seizure with high level decision-making).        Problem/Recommendation - 1:  Problem: Type 2 diabetes mellitus with hyperglycemia, without long-term current use of insulin. Recommendation: Newly diagnosed diabetes with HbA1C 9.0 presenting with DKA, suspect ketosis prone type 2 given obesity. However, given acute development of marked hyperglycemia and diabetes, other possible etiologies include DENTON and pancreatic cancer. DKA now resolved and pt transitioned to basal insulin with low dose humalog correction scale q6h. Enteral feeds with Glucerna 1.5 rajesh started today 8/29 ~ 1:30pm at rate of 10/hr with plan to increase rate to goal rate of 20 over next 4 hrs. Unclear trajectory of BG trend and insulin requirements given pt was just started on enteral feeds and is in the process of being up-titrated.  IF pt remains on enteral feeds:  - recommend increasing Lantus to 34 units qhs   - low dose Humalog correction scale q6h  - check FS q6h  - can increase scales to moderate and/or add q6h NPH based on BG trend  IF pt is on FULL PO diet:  - recommend Lantus 30 units with Humalog 8 TID pre-meal with low correction scales ac and hs  IF pt is on clear diet:  - recommend Lantus 34 units qhs plus low correction scale ac and hs.   - can increase pre-meal correction scale to moderate based on BG trend   - f/u CATY antibody, Islet cell antibody, insulin antibody to r/o DENTON  - consider abdominal imaging to r/o pancreatic cancer   Discharge:  - Plan TBD based on further data.     Problem/Recommendation - 2:  ·  Problem: Diabetic ketoacidosis without coma associated with type 2 diabetes mellitus.  Recommendation: management as above.      Problem/Recommendation - 3:  ·  Problem: Hypertension, unspecified type.  Recommendation: - goal BP <130/80  - management per primary team      Lizbeth Wells DO, Endocrine Fellow   Pager [phone]. from 9-5PM. After hours and on weekends please call [phone]. 69 y/o F with history of HTN, Gout, obesity, presenting w/ b/l ankle swelling, fatigue/sleepiness and generalized weakness x several days. Found to have serum BG of 796, AG 22, bicarb 21, BHB2.3, lactate 9, WBC 10.88. UA negative for UTI. CXR showed clear lungs. CTH showing possible meningioma. Given 2L IVF bolus and  admitted to MICU where she was started on insulin drip and IVF. Dextrose added to IVF at ~4pm on 8/26 and stopped early this morning 8/72. CCB seizure while in MICU possibly due to rapid correction of BG, intubated for airway protection. Endocrinology consulted for concern for DKA/HHS in the setting of newly diagnosed DM (high risk patient with DKA with seizure with high level decision-making).        Problem/Recommendation - 1:  Problem: Type 2 diabetes mellitus with hyperglycemia, without long-term current use of insulin. Recommendation: Newly diagnosed diabetes with HbA1C 9.0 presenting with DKA, suspect ketosis prone type 2 given obesity. However, given acute development of marked hyperglycemia and diabetes, other possible etiologies include DENTON and pancreatic cancer. DKA now resolved and pt transitioned to basal insulin with low dose humalog correction scale q6h. Enteral feeds with Glucerna 1.5 rajesh started today 8/29 ~ 1:30pm at rate of 10/hr with plan to increase rate to goal rate of 20 over next 4 hrs. Unclear trajectory of BG trend and insulin requirements given pt was just started on enteral feeds and is in the process of being up-titrated.  IF pt remains on enteral feeds:  - recommend increasing Lantus to 34 units qhs   - low dose Humalog correction scale q6h  - check FS q6h  - can increase scales to moderate and/or add q6h NPH based on BG trend  IF pt is on FULL PO diet:  - recommend Lantus 30 units with Humalog 8 TID pre-meal with low correction scales ac and hs  IF pt is on clear diet:  - recommend Lantus 34 units qhs plus low correction scale ac and hs.   - can increase pre-meal correction scale to moderate based on BG trend   - f/u CATY antibody, Islet cell antibody, insulin antibody to r/o DENTON  - consider abdominal imaging to r/o pancreatic cancer   Discharge:  - Plan TBD based on further data.     Problem/Recommendation - 2:  ·  Problem: Diabetic ketoacidosis without coma associated with type 2 diabetes mellitus.  Recommendation: management as above.      Problem/Recommendation - 3:  ·  Problem: Hypertension, unspecified type.  Recommendation: - goal BP <130/80  - management per primary team    D/W Team    Lizbeth Wells DO, Endocrine Fellow   Pager [phone]. from 9-5PM. After hours and on weekends please call [phone]. 69 y/o F with history of HTN, Gout, obesity, presenting w/ b/l ankle swelling, fatigue/sleepiness and generalized weakness x several days. Found to have serum BG of 796, AG 22, bicarb 21, BHB2.3, lactate 9, WBC 10.88. UA negative for UTI. CXR showed clear lungs. CTH showing possible meningioma. Given 2L IVF bolus and  admitted to MICU where she was started on insulin drip and IVF. Dextrose added to IVF at ~4pm on 8/26 and stopped early this morning 8/72. CCB seizure while in MICU possibly due to rapid correction of BG, intubated for airway protection. Endocrinology consulted for concern for DKA/HHS in the setting of newly diagnosed DM (high risk patient with DKA with seizure with high level decision-making).        Problem/Recommendation - 1:  Problem: Type 2 diabetes mellitus with hyperglycemia, without long-term current use of insulin. Recommendation: Newly diagnosed diabetes with HbA1C 9.0 presenting with DKA, suspect ketosis prone type 2 given obesity. However, given acute development of marked hyperglycemia and diabetes, other possible etiologies include DENTON and pancreatic cancer. DKA now resolved and pt transitioned to basal insulin with low dose humalog correction scale q6h. Enteral feeds with Glucerna 1.5 rajesh started today 8/29 ~ 1:30pm at rate of 10/hr with plan to increase rate to goal rate of 20 over next 4 hrs. Unclear trajectory of BG trend and insulin requirements given pt was just started on enteral feeds and is in the process of being up-titrated.  IF pt remains on enteral feeds:  - recommend increasing Lantus to 34 units qhs   - low dose Humalog correction scale q6h  - check FS q6h  - can increase scales to moderate and/or add q6h NPH based on BG trend  IF pt is on FULL PO diet:  - recommend Lantus 30 units with Humalog 8 TID pre-meal with low correction scales ac and hs  IF pt is on clear diet:  - recommend Lantus 34 units qhs plus low correction scale ac and hs.   - can increase pre-meal correction scale to moderate based on BG trend     - f/u CATY antibody, Islet cell antibody, insulin antibody to r/o DENTON  - consider abdominal imaging to r/o pancreatic cancer   Discharge:  - Plan TBD based on further data.     Problem/Recommendation - 2:  ·  Problem: Diabetic ketoacidosis without coma associated with type 2 diabetes mellitus.  Recommendation: management as above.      Problem/Recommendation - 3:  ·  Problem: Hypertension, unspecified type.  Recommendation: - goal BP <130/80  - management per primary team    D/W Team    Lizbeth Wells DO, Endocrine Fellow   Pager [phone]. from 9-5PM. After hours and on weekends please call [phone]. 71 y/o F with history of HTN, Gout, obesity, presenting w/ b/l ankle swelling, fatigue/sleepiness and generalized weakness x several days. Found to have serum BG of 796, AG 22, bicarb 21, BHB2.3, lactate 9, WBC 10.88. UA negative for UTI. CXR showed clear lungs. CTH showing possible meningioma. Given 2L IVF bolus and  admitted to MICU where she was started on insulin drip and IVF. Dextrose added to IVF at ~4pm on 8/26 and stopped early this morning 8/72. CCB seizure while in MICU possibly due to rapid correction of BG, intubated for airway protection. Endocrinology consulted for concern for DKA/HHS in the setting of newly diagnosed DM (high risk patient with DKA with seizure with high level decision-making).        Problem/Recommendation - 1:  Problem: Type 2 diabetes mellitus with hyperglycemia, without long-term current use of insulin. Recommendation: Newly diagnosed diabetes with HbA1C 9.0 presenting with DKA, suspect ketosis prone type 2 given obesity. However, given acute development of marked hyperglycemia and diabetes, other possible etiologies include DENTON and pancreatic cancer. DKA now resolved and pt transitioned to basal insulin with low dose humalog correction scale q6h. Enteral feeds with Glucerna 1.5 rajesh started today 8/29 ~ 1:30pm at rate of 10/hr with plan to increase rate to goal rate of 20 over next 4 hrs. Unclear trajectory of BG trend and insulin requirements given pt was just started on enteral feeds and is in the process of being up-titrated.  IF pt remains on enteral feeds:  - recommend increasing Lantus to 34 units qhs   - low dose Humalog correction scale q6h  - check FS q6h  - can increase scales to moderate and/or add q6h NPH based on BG trend  IF pt is on FULL PO diet:  - recommend Lantus 30 units with Humalog 8 TID pre-meal with low pre-meal Humalog correction scale and separate low bed time Humalog correction scale   IF pt is on clear diet:  - recommend Lantus 34 units qhs plus low pre-meal Humalog correction scale and separate low bed time Humalog correction scale   - can increase pre-meal correction scale to moderate based on BG trend     - f/u CATY antibody, Islet cell antibody, insulin antibody to r/o DENTON  - consider abdominal imaging to r/o pancreatic cancer   Discharge:  - Plan TBD based on further data.     Problem/Recommendation - 2:  ·  Problem: Diabetic ketoacidosis without coma associated with type 2 diabetes mellitus.  Recommendation: management as above.      Problem/Recommendation - 3:  ·  Problem: Hypertension, unspecified type.  Recommendation: - goal BP <130/80  - management per primary team    D/W Team    Lizbeth Wells DO, Endocrine Fellow   Pager [phone]. from 9-5PM. After hours and on weekends please call [phone]. 69 y/o F with history of HTN, Gout, obesity, presenting w/ b/l ankle swelling, fatigue/sleepiness and generalized weakness x several days. Found to have serum BG of 796, AG 22, bicarb 21, BHB2.3, lactate 9, WBC 10.88. UA negative for UTI. CXR showed clear lungs. CTH showing possible meningioma. Given 2L IVF bolus and  admitted to MICU where she was started on insulin drip and IVF. Dextrose added to IVF at ~4pm on 8/26 and stopped early this morning 8/72. CCB seizure while in MICU possibly due to rapid correction of BG, intubated for airway protection. Endocrinology consulted for concern for DKA/HHS in the setting of newly diagnosed DM (high risk patient with DKA with seizure with high level decision-making).        Problem/Recommendation - 1:  Problem: Type 2 diabetes mellitus with hyperglycemia, without long-term current use of insulin. Recommendation: Newly diagnosed diabetes with HbA1C 9.0 presenting with DKA, suspect ketosis prone type 2 given obesity. However, given acute development of marked hyperglycemia and diabetes, other possible etiologies include DENTON and pancreatic cancer. DKA now resolved and pt transitioned to basal insulin with low dose humalog correction scale q6h. Enteral feeds with Glucerna 1.5 rajesh started today 8/29 ~ 1:30pm at rate of 10/hr with plan to increase rate to goal rate of 20 over next 4 hrs. Unclear trajectory of BG trend and insulin requirements given pt was just started on enteral feeds and is in the process of being up-titrated.  IF pt remains on enteral feeds:  - recommend increasing Lantus to 34 units qhs   - moderate Humalog correction scale q6h  - check FS q6h  - can increase scales to moderate and/or add q6h NPH based on BG trend  IF pt is on FULL PO diet:  - recommend Lantus 30 units with Humalog 8 TID pre-meal with low pre-meal Humalog correction scale and separate low bed time Humalog correction scale   IF pt is on clear diet:  - recommend Lantus 34 units qhs plus low pre-meal Humalog correction scale and separate low bed time Humalog correction scale   - can increase pre-meal correction scale to moderate based on BG trend     - f/u CATY antibody, Islet cell antibody, insulin antibody to r/o DENTON  - consider abdominal imaging to r/o pancreatic cancer   Discharge:  - Plan TBD based on further data.     Problem/Recommendation - 2:  ·  Problem: Diabetic ketoacidosis without coma associated with type 2 diabetes mellitus.  Recommendation: management as above.      Problem/Recommendation - 3:  ·  Problem: Hypertension, unspecified type.  Recommendation: - goal BP <130/80  - management per primary team    D/W Team    Lizbeth Wells DO, Endocrine Fellow   Pager [phone]. from 9-5PM. After hours and on weekends please call [phone]. 69 y/o F with history of HTN, Gout, obesity, presenting w/ b/l ankle swelling, fatigue/sleepiness and generalized weakness x several days. Found to have serum BG of 796, AG 22, bicarb 21, BHB2.3, lactate 9, WBC 10.88. UA negative for UTI. CXR showed clear lungs. CTH showing possible meningioma. Given 2L IVF bolus and  admitted to MICU where she was started on insulin drip and IVF. Dextrose added to IVF at ~4pm on 8/26 and stopped early this morning 8/72. CCB seizure while in MICU possibly due to rapid correction of BG, intubated for airway protection. Endocrinology consulted for concern for DKA/HHS in the setting of newly diagnosed DM (high risk patient with DKA with seizure with high level decision-making).        Problem/Recommendation - 1:  Problem: Type 2 diabetes mellitus with hyperglycemia, without long-term current use of insulin. Recommendation: Newly diagnosed diabetes with HbA1C 9.0 presenting with DKA, suspect ketosis prone type 2 given obesity. However, given acute development of marked hyperglycemia and diabetes, other possible etiologies include DENTON and pancreatic cancer. DKA now resolved and pt transitioned to basal insulin with low dose humalog correction scale q6h. Enteral feeds with Glucerna 1.5 rajesh started today 8/29 ~ 1:30pm at rate of 10/hr with plan to increase rate to goal rate of 20 over next 4 hrs. Unclear trajectory of BG trend and insulin requirements given pt was just started on enteral feeds and is in the process of being up-titrated.  IF pt remains on enteral feeds:  - recommend increasing Lantus to 34 units qhs   - moderate Humalog correction scale q6h  - check FS q6h  - can add q6h NPH based on BG trend  IF pt is on FULL PO diet:  - recommend Lantus 30 units with Humalog 8 TID pre-meal with low pre-meal Humalog correction scale and separate low bed time Humalog correction scale   IF pt is on clear diet:  - recommend Lantus 34 units qhs plus low pre-meal Humalog correction scale and separate low bed time Humalog correction scale   - can increase pre-meal correction scale to moderate based on BG trend     - f/u CATY antibody, Islet cell antibody, insulin antibody to r/o DENTON  - consider abdominal imaging to r/o pancreatic cancer   Discharge:  - Plan TBD based on further data.     Problem/Recommendation - 2:  ·  Problem: Diabetic ketoacidosis without coma associated with type 2 diabetes mellitus.  Recommendation: management as above.      Problem/Recommendation - 3:  ·  Problem: Hypertension, unspecified type.  Recommendation: - goal BP <130/80  - management per primary team    D/W Team    Lizbeth Wells DO, Endocrine Fellow   Pager [phone]. from 9-5PM. After hours and on weekends please call [phone].

## 2020-08-29 NOTE — PROGRESS NOTE ADULT - ATTENDING COMMENTS
Bailey Hill MD  Pager [phone] ([facility])/ [phone] ([facility]) [please provide 10 digit call back number]  Nights and weekends: [phone]  Please note that this patient may be followed by a different provider tomorrow. If no answer or after hours, please contact [phone].  For final dc reccomendations, please call [phone] or page the endocrine fellow on call.    Spent over 40 minutes assessing pt/labs/meds and discussing plan of care with primary team.   (high risk patient with severely uncontrolled diabetes with glucose, with DKA, high risk for CAD and CVA with high level decision-making).

## 2020-08-29 NOTE — PROGRESS NOTE ADULT - ATTENDING COMMENTS
Pt seen and examined. 70 F with medical history as noted above, admitted to MICU with hypertensive urgency and a hyperosmolar non ketotic state, with course complicated by a seizure, acute resp failure with hypoxia requiring intubation and pseudomonas pneumonia. Resp status stable since extubation. Bibasilar consolidations on POCUS, cont Cefepime for pseudomonas PNA. VEEG negative for seizures. Cont Keppra BID. Arousable and AAOx3 but falls back asleep immediately. Ct head with possible meningioma, requires MRI once stable. Now off Cardene gtt, still hypertensive on Nicardipine PO. Resume home dose of Lisinopril. Hyperglycemia improved, cont Lantus and insulin S/S coverage. Start trickle feeds today. Prognosis guarded. Pt seen and examined. 70 F with medical history as noted above, admitted to MICU with hypertensive urgency and DKA with course complicated by a seizure, acute resp failure with hypoxia requiring intubation and pseudomonas pneumonia. Resp status stable since extubation. Bibasilar consolidations on POCUS, cont Cefepime for pseudomonas PNA. VEEG negative for seizures. Cont Keppra BID. Arousable and AAOx3 but falls back asleep immediately. Ct head with possible meningioma, requires MRI once stable. Now off Cardene gtt, still hypertensive on Nicardipine PO. Resume home dose of Lisinopril. DKA resolved, cont Lantus and insulin S/S coverage. Start trickle feeds today. Prognosis guarded.

## 2020-08-29 NOTE — PROGRESS NOTE ADULT - SUBJECTIVE AND OBJECTIVE BOX
Chief Complaint: new onset DM presenting with DKA    History: Pt seen and examined. Awake, alert, conversive. States she feels better.   Pt started on continuous enteral feeds with Glucerna 1.5 rajesh at rate of 10/hr at ~ 1:30pm today with plan to increase rate to 20 over next 4 hrs.    MEDICATIONS  (STANDING):  allopurinol 100 milliGRAM(s) Oral every 12 hours  aspirin  chewable 81 milliGRAM(s) Oral daily  atorvastatin 80 milliGRAM(s) Oral at bedtime  brimonidine 0.2% Ophthalmic Solution 1 Drop(s) Both EYES two times a day  cefepime   IVPB 1000 milliGRAM(s) IV Intermittent every 8 hours  cefepime   IVPB      chlorhexidine 4% Liquid 1 Application(s) Topical <User Schedule>  dextrose 5%. 1000 milliLiter(s) (50 mL/Hr) IV Continuous <Continuous>  dextrose 50% Injectable 12.5 Gram(s) IV Push once  dextrose 50% Injectable 25 Gram(s) IV Push once  dextrose 50% Injectable 25 Gram(s) IV Push once  enoxaparin Injectable 40 milliGRAM(s) SubCutaneous every 12 hours  insulin glargine Injectable (LANTUS) 32 Unit(s) SubCutaneous at bedtime  insulin lispro (HumaLOG) corrective regimen sliding scale   SubCutaneous every 6 hours  latanoprost 0.005% Ophthalmic Solution 1 Drop(s) Both EYES at bedtime  levETIRAcetam  IVPB 1000 milliGRAM(s) IV Intermittent every 12 hours  lisinopril 40 milliGRAM(s) Oral daily  niCARdipine 40 milliGRAM(s) Oral three times a day  pantoprazole  Injectable 40 milliGRAM(s) IV Push daily  polyethylene glycol 3350 17 Gram(s) Oral daily    MEDICATIONS  (PRN):  dextrose 40% Gel 15 Gram(s) Oral once PRN Blood Glucose LESS THAN 70 milliGRAM(s)/deciLiter  glucagon  Injectable 1 milliGRAM(s) IntraMuscular once PRN Glucose <70 milliGRAM(s)/deciLiter  labetalol Injectable 10 milliGRAM(s) IV Push every 6 hours PRN BP > 180  melatonin 3 milliGRAM(s) Oral at bedtime PRN Insomnia      Allergies    No Known Allergies    Intolerances      Review of Systems:    UNABLE TO OBTAIN due to agitation    PHYSICAL EXAM:  Vital Signs Last 24 Hrs  T(C): 37.1 (29 Aug 2020 16:30), Max: 37.1 (29 Aug 2020 16:30)  T(F): 98.7 (29 Aug 2020 16:30), Max: 98.7 (29 Aug 2020 16:30)  HR: 82 (29 Aug 2020 19:00) (61 - 106)  BP: 145/70 (29 Aug 2020 19:00) (115/58 - 199/79)  BP(mean): 99 (29 Aug 2020 19:00) (74 - 134)  RR: 20 (29 Aug 2020 19:00) (10 - 31)  SpO2: 100% (29 Aug 2020 19:00) (95% - 100%)    GENERAL: awake, alert, NAD, obese   EYES: No proptosis, no lid lag, anicteric  THYROID: Normal size, no palpable nodules  RESPIRATORY: CTA b/l, no wheezes or crackles   CARDIOVASCULAR: S1S2 RRR; no murmurs; no peripheral edema  GI: Soft, nontender, non distended, normal bowel sounds  SKIN: Dry, intact, No rashes or lesions  PSYCH: agitated   Neuro: AAOx3, conversive     CAPILLARY BLOOD GLUCOSE      POCT Blood Glucose.: 223 mg/dL (29 Aug 2020 18:17)  POCT Blood Glucose.: 198 mg/dL (29 Aug 2020 12:02)  POCT Blood Glucose.: 206 mg/dL (29 Aug 2020 05:34)  POCT Blood Glucose.: 230 mg/dL (28 Aug 2020 23:17)      08-29    145  |  103  |  16  ----------------------------<  238<H>  3.4<L>   |  26  |  0.81    Ca    8.3<L>      29 Aug 2020 12:12  Phos  3.5     08-29  Mg     2.0     08-29                          11.5   8.55  )-----------( 126      ( 29 Aug 2020 00:38 )             36.0             Thyroid Function Tests:

## 2020-08-30 LAB
ANION GAP SERPL CALC-SCNC: 16 MMOL/L — SIGNIFICANT CHANGE UP (ref 5–17)
APTT BLD: 33.2 SEC — SIGNIFICANT CHANGE UP (ref 27.5–35.5)
BUN SERPL-MCNC: 17 MG/DL — SIGNIFICANT CHANGE UP (ref 7–23)
CALCIUM SERPL-MCNC: 8.3 MG/DL — LOW (ref 8.4–10.5)
CHLORIDE SERPL-SCNC: 106 MMOL/L — SIGNIFICANT CHANGE UP (ref 96–108)
CO2 SERPL-SCNC: 25 MMOL/L — SIGNIFICANT CHANGE UP (ref 22–31)
CREAT SERPL-MCNC: 0.85 MG/DL — SIGNIFICANT CHANGE UP (ref 0.5–1.3)
GLUCOSE BLDC GLUCOMTR-MCNC: 189 MG/DL — HIGH (ref 70–99)
GLUCOSE BLDC GLUCOMTR-MCNC: 227 MG/DL — HIGH (ref 70–99)
GLUCOSE BLDC GLUCOMTR-MCNC: 245 MG/DL — HIGH (ref 70–99)
GLUCOSE BLDC GLUCOMTR-MCNC: 335 MG/DL — HIGH (ref 70–99)
GLUCOSE SERPL-MCNC: 273 MG/DL — HIGH (ref 70–99)
HCT VFR BLD CALC: 37.9 % — SIGNIFICANT CHANGE UP (ref 34.5–45)
HGB BLD-MCNC: 12 G/DL — SIGNIFICANT CHANGE UP (ref 11.5–15.5)
INR BLD: 1.12 RATIO — SIGNIFICANT CHANGE UP (ref 0.88–1.16)
ISLET CELL512 AB SER-ACNC: SIGNIFICANT CHANGE UP
MAGNESIUM SERPL-MCNC: 1.9 MG/DL — SIGNIFICANT CHANGE UP (ref 1.6–2.6)
MCHC RBC-ENTMCNC: 30 PG — SIGNIFICANT CHANGE UP (ref 27–34)
MCHC RBC-ENTMCNC: 31.7 GM/DL — LOW (ref 32–36)
MCV RBC AUTO: 94.8 FL — SIGNIFICANT CHANGE UP (ref 80–100)
NRBC # BLD: 0 /100 WBCS — SIGNIFICANT CHANGE UP (ref 0–0)
PHOSPHATE SERPL-MCNC: 2.3 MG/DL — LOW (ref 2.5–4.5)
PLATELET # BLD AUTO: 133 K/UL — LOW (ref 150–400)
POTASSIUM SERPL-MCNC: 3.7 MMOL/L — SIGNIFICANT CHANGE UP (ref 3.5–5.3)
POTASSIUM SERPL-SCNC: 3.7 MMOL/L — SIGNIFICANT CHANGE UP (ref 3.5–5.3)
PROTHROM AB SERPL-ACNC: 13.3 SEC — SIGNIFICANT CHANGE UP (ref 10.6–13.6)
RBC # BLD: 4 M/UL — SIGNIFICANT CHANGE UP (ref 3.8–5.2)
RBC # FLD: 14 % — SIGNIFICANT CHANGE UP (ref 10.3–14.5)
SODIUM SERPL-SCNC: 147 MMOL/L — HIGH (ref 135–145)
WBC # BLD: 10.47 K/UL — SIGNIFICANT CHANGE UP (ref 3.8–10.5)
WBC # FLD AUTO: 10.47 K/UL — SIGNIFICANT CHANGE UP (ref 3.8–10.5)

## 2020-08-30 PROCEDURE — 99232 SBSQ HOSP IP/OBS MODERATE 35: CPT | Mod: GC

## 2020-08-30 PROCEDURE — 99233 SBSQ HOSP IP/OBS HIGH 50: CPT | Mod: GC

## 2020-08-30 RX ORDER — INSULIN LISPRO 100/ML
VIAL (ML) SUBCUTANEOUS
Refills: 0 | Status: DISCONTINUED | OUTPATIENT
Start: 2020-08-30 | End: 2020-08-30

## 2020-08-30 RX ORDER — INSULIN GLARGINE 100 [IU]/ML
38 INJECTION, SOLUTION SUBCUTANEOUS AT BEDTIME
Refills: 0 | Status: DISCONTINUED | OUTPATIENT
Start: 2020-08-30 | End: 2020-08-31

## 2020-08-30 RX ORDER — INSULIN LISPRO 100/ML
VIAL (ML) SUBCUTANEOUS
Refills: 0 | Status: DISCONTINUED | OUTPATIENT
Start: 2020-08-30 | End: 2020-09-02

## 2020-08-30 RX ORDER — INSULIN LISPRO 100/ML
8 VIAL (ML) SUBCUTANEOUS
Refills: 0 | Status: DISCONTINUED | OUTPATIENT
Start: 2020-08-30 | End: 2020-08-30

## 2020-08-30 RX ORDER — INSULIN LISPRO 100/ML
14 VIAL (ML) SUBCUTANEOUS
Refills: 0 | Status: DISCONTINUED | OUTPATIENT
Start: 2020-08-30 | End: 2020-08-31

## 2020-08-30 RX ORDER — POTASSIUM PHOSPHATE, MONOBASIC POTASSIUM PHOSPHATE, DIBASIC 236; 224 MG/ML; MG/ML
15 INJECTION, SOLUTION INTRAVENOUS ONCE
Refills: 0 | Status: DISCONTINUED | OUTPATIENT
Start: 2020-08-30 | End: 2020-08-30

## 2020-08-30 RX ORDER — CEFEPIME 1 G/1
2000 INJECTION, POWDER, FOR SOLUTION INTRAMUSCULAR; INTRAVENOUS EVERY 8 HOURS
Refills: 0 | Status: DISCONTINUED | OUTPATIENT
Start: 2020-08-30 | End: 2020-09-02

## 2020-08-30 RX ORDER — POTASSIUM PHOSPHATE, MONOBASIC POTASSIUM PHOSPHATE, DIBASIC 236; 224 MG/ML; MG/ML
30 INJECTION, SOLUTION INTRAVENOUS ONCE
Refills: 0 | Status: COMPLETED | OUTPATIENT
Start: 2020-08-30 | End: 2020-08-30

## 2020-08-30 RX ADMIN — CHLORHEXIDINE GLUCONATE 1 APPLICATION(S): 213 SOLUTION TOPICAL at 05:04

## 2020-08-30 RX ADMIN — ENOXAPARIN SODIUM 40 MILLIGRAM(S): 100 INJECTION SUBCUTANEOUS at 05:05

## 2020-08-30 RX ADMIN — Medication 14 UNIT(S): at 17:07

## 2020-08-30 RX ADMIN — BRIMONIDINE TARTRATE 1 DROP(S): 2 SOLUTION/ DROPS OPHTHALMIC at 17:07

## 2020-08-30 RX ADMIN — CEFEPIME 100 MILLIGRAM(S): 1 INJECTION, POWDER, FOR SOLUTION INTRAMUSCULAR; INTRAVENOUS at 14:19

## 2020-08-30 RX ADMIN — Medication 2: at 21:16

## 2020-08-30 RX ADMIN — CEFEPIME 100 MILLIGRAM(S): 1 INJECTION, POWDER, FOR SOLUTION INTRAMUSCULAR; INTRAVENOUS at 21:09

## 2020-08-30 RX ADMIN — NICARDIPINE HYDROCHLORIDE 40 MILLIGRAM(S): 30 CAPSULE, EXTENDED RELEASE ORAL at 21:24

## 2020-08-30 RX ADMIN — ENOXAPARIN SODIUM 40 MILLIGRAM(S): 100 INJECTION SUBCUTANEOUS at 17:02

## 2020-08-30 RX ADMIN — LEVETIRACETAM 400 MILLIGRAM(S): 250 TABLET, FILM COATED ORAL at 05:04

## 2020-08-30 RX ADMIN — INSULIN GLARGINE 38 UNIT(S): 100 INJECTION, SOLUTION SUBCUTANEOUS at 21:16

## 2020-08-30 RX ADMIN — Medication 100 MILLIGRAM(S): at 21:10

## 2020-08-30 RX ADMIN — Medication 4: at 17:02

## 2020-08-30 RX ADMIN — LEVETIRACETAM 400 MILLIGRAM(S): 250 TABLET, FILM COATED ORAL at 17:02

## 2020-08-30 RX ADMIN — Medication 3 MILLIGRAM(S): at 21:10

## 2020-08-30 RX ADMIN — CEFEPIME 100 MILLIGRAM(S): 1 INJECTION, POWDER, FOR SOLUTION INTRAMUSCULAR; INTRAVENOUS at 05:06

## 2020-08-30 RX ADMIN — Medication 100 MILLIGRAM(S): at 10:26

## 2020-08-30 RX ADMIN — Medication 8: at 12:20

## 2020-08-30 RX ADMIN — BRIMONIDINE TARTRATE 1 DROP(S): 2 SOLUTION/ DROPS OPHTHALMIC at 05:05

## 2020-08-30 RX ADMIN — BRIMONIDINE TARTRATE 1 DROP(S): 2 SOLUTION/ DROPS OPHTHALMIC at 07:37

## 2020-08-30 RX ADMIN — NICARDIPINE HYDROCHLORIDE 40 MILLIGRAM(S): 30 CAPSULE, EXTENDED RELEASE ORAL at 05:06

## 2020-08-30 RX ADMIN — Medication 8 UNIT(S): at 12:20

## 2020-08-30 RX ADMIN — POTASSIUM PHOSPHATE, MONOBASIC POTASSIUM PHOSPHATE, DIBASIC 83.33 MILLIMOLE(S): 236; 224 INJECTION, SOLUTION INTRAVENOUS at 01:00

## 2020-08-30 RX ADMIN — NICARDIPINE HYDROCHLORIDE 40 MILLIGRAM(S): 30 CAPSULE, EXTENDED RELEASE ORAL at 14:20

## 2020-08-30 RX ADMIN — Medication 4: at 05:11

## 2020-08-30 RX ADMIN — LISINOPRIL 40 MILLIGRAM(S): 2.5 TABLET ORAL at 05:05

## 2020-08-30 RX ADMIN — LATANOPROST 1 DROP(S): 0.05 SOLUTION/ DROPS OPHTHALMIC; TOPICAL at 21:10

## 2020-08-30 RX ADMIN — Medication 81 MILLIGRAM(S): at 11:44

## 2020-08-30 RX ADMIN — ATORVASTATIN CALCIUM 80 MILLIGRAM(S): 80 TABLET, FILM COATED ORAL at 21:10

## 2020-08-30 NOTE — PROGRESS NOTE ADULT - SUBJECTIVE AND OBJECTIVE BOX
CC:  HHS, Seizure (27 Aug 2020 15:43) and encephalopathy     HPI:  69 yo female with PMHx of HTN, BERKLEY, GERD, GIant cell arthritis, Gout presenting to [facility] ED 8/25 PM with 1 week c/o generalized weakness/dysuria/polyuria/polydipsia and RUE dystonia/hand twitching. Pt found to be in hypertensive urgency (/102) s/p total Labetalol 40 IVP in ED. Pt also found to be hyperglycemic to 796 with lactic acidosis started on insulin gtt for concern of HHS vs DKA with rapid correction of blood glucose. Course c/b by acute metabolic encephalopathy/generalized seizures thought to be r/t hyperglycemic hyperosmolar state requiring urgent intubation for airway protection s/p extubation 8/27. The pt has been IV Keppra loaded. and s/p versed gtt. CT of the brain concern for  a possible meningioma, awaiting MRI brain. No active seizures have been seen on >24H EEG's, Pt periods of hypertensive episodes better controlled now s/p starting pt on Labetalol and home dose of Linsinopril in addition to Nicardipine. BG levels greatly improved.    Interval Events: Lantus was increased 34U      REVIEW OF SYSTEMS:  Constitutional: [ ] fevers [ ] chills [ ] weight loss [ ] weight gain  HEENT: [ ] dry eyes [ ] eye irritation [ ] postnasal drip [ ] nasal congestion  CV: [ ] chest pain [ ] orthopnea [ ] palpitations [ ] murmur  Resp: [ ] cough [ ] shortness of breath [ ] dyspnea [ ] wheezing [ ] sputum [ ] hemoptysis  GI: [ ] nausea [ ] vomiting [ ] diarrhea [ ] constipation [ ] abd pain [ ] dysphagia   : [ ] dysuria [ ] nocturia [ ] hematuria [ ] increased urinary frequency  Musculoskeletal: [ ] back pain [ ] myalgias [ ] arthralgias [ ] fracture  Skin: [ ] rash [ ] itch  Neurological: [ ] headache [ ] dizziness [ ] syncope [ ] weakness [ ] numbness  Psychiatric: [ ] anxiety [ ] depression  Endocrine: [ ] diabetes [ ] thyroid problem  Hematologic/Lymphatic: [ ] anemia [ ] bleeding problem  Allergic/Immunologic: [ ] itchy eyes [ ] nasal discharge [ ] hives [ ] angioedema  [ ] All other systems negative  [ ] Unable to assess ROS because ________    OBJECTIVE:  ICU Vital Signs Last 24 Hrs  T(C): 36.9 (30 Aug 2020 04:00), Max: 37.1 (29 Aug 2020 16:30)  T(F): 98.4 (30 Aug 2020 04:00), Max: 98.7 (29 Aug 2020 16:30)  HR: 80 (30 Aug 2020 06:00) (61 - 106)  BP: 135/65 (30 Aug 2020 06:00) (115/58 - 199/79)  BP(mean): 91 (30 Aug 2020 06:00) (74 - 129)  ABP: --  ABP(mean): --  RR: 22 (30 Aug 2020 06:00) (10 - 31)  SpO2: 100% (30 Aug 2020 06:00) (96% - 100%)      I & O's    08-28 @ 07:01  -  08-29 @ 07:00  --------------------------------------------------------  IN: 2028.2 mL / OUT: 1200 mL / NET: 828.2 mL    08-29 @ 07:01  -  08-30 @ 06:14  --------------------------------------------------------  IN: 2379.8 mL / OUT: 1050 mL / NET: 1329.8 mL      CAPILLARY BLOOD GLUCOSE  POCT Blood Glucose.: 227 mg/dL (30 Aug 2020 04:58)      PHYSICAL EXAM:  General: appears calm and w/o c/o pain  HEENT: sclera clear, trachea midline  Lymph Nodes: non palp  Neck: supple  Respiratory: bilaterally equal BS / BCTA  Cardiovascular: S1S2 RRR, all pulses palp 1+/3- limited by body habitus  Abdomen: obese, soft, non distended, +distant BS hypoactive  Extremities: edema - non pitting x 4  Skin: warm, dry  Neurological: +PERRL, neg nystagmus, speech is clear, following all commands appropriately, bilaterally equal strength and mobility x 4  Psychiatry: unable to assess    LINES: BUE arrows      HOSPITAL MEDICATIONS:  MEDICATIONS  (STANDING):  allopurinol 100 milliGRAM(s) Oral every 12 hours  aspirin  chewable 81 milliGRAM(s) Oral daily  atorvastatin 80 milliGRAM(s) Oral at bedtime  brimonidine 0.2% Ophthalmic Solution 1 Drop(s) Both EYES two times a day  cefepime   IVPB 1000 milliGRAM(s) IV Intermittent every 8 hours  cefepime   IVPB      chlorhexidine 4% Liquid 1 Application(s) Topical <User Schedule>  dextrose 5%. 1000 milliLiter(s) (50 mL/Hr) IV Continuous <Continuous>  dextrose 50% Injectable 12.5 Gram(s) IV Push once  dextrose 50% Injectable 25 Gram(s) IV Push once  dextrose 50% Injectable 25 Gram(s) IV Push once  enoxaparin Injectable 40 milliGRAM(s) SubCutaneous every 12 hours  insulin glargine Injectable (LANTUS) 34 Unit(s) SubCutaneous at bedtime  insulin lispro (HumaLOG) corrective regimen sliding scale   SubCutaneous every 6 hours  latanoprost 0.005% Ophthalmic Solution 1 Drop(s) Both EYES at bedtime  levETIRAcetam  IVPB 1000 milliGRAM(s) IV Intermittent every 12 hours  lisinopril 40 milliGRAM(s) Oral daily  niCARdipine 40 milliGRAM(s) Oral three times a day  pantoprazole  Injectable 40 milliGRAM(s) IV Push daily  polyethylene glycol 3350 17 Gram(s) Oral daily    MEDICATIONS  (PRN):  dextrose 40% Gel 15 Gram(s) Oral once PRN Blood Glucose LESS THAN 70 milliGRAM(s)/deciLiter  glucagon  Injectable 1 milliGRAM(s) IntraMuscular once PRN Glucose <70 milliGRAM(s)/deciLiter  labetalol Injectable 10 milliGRAM(s) IV Push every 6 hours PRN BP > 180  melatonin 3 milliGRAM(s) Oral at bedtime PRN Insomnia      LABS:                        12.0   10.47 )-----------( 133      ( 29 Aug 2020 23:57 )             37.9     Hgb Trend: 12.0<--, 11.5<--, 12.3<--, 13.5<--, 12.9<--  08-29    147<H>  |  106  |  17  ----------------------------<  273<H>  3.7   |  25  |  0.85    Ca    8.3<L>      29 Aug 2020 23:57  Phos  2.3     08-29  Mg     1.9     08-29      Creatinine Trend: 0.85<--, 0.81<--, 0.79<--, 0.80<--, 0.76<--, 0.80<--  PT/INR - ( 29 Aug 2020 23:57 )   PT: 13.3 sec;   INR: 1.12 ratio    PTT - ( 29 Aug 2020 23:57 )  PTT:33.2 sec      MICROBIOLOGY:     MICROBIOLOGY:   Culture - Sputum . (08.26.20 @ 17:11)    -  Gentamicin: S 4    -  Imipenem: S <=1    -  Levofloxacin: S <=0.5    -  Meropenem: S <=1    -  Piperacillin/Tazobactam: S <=8    -  Tobramycin: S <=2    Gram Stain:   Few polymorphonuclear leukocytes per low power field  No Squamous epithelial cells per low power field  Few Gram Negative Rods per oil power field    -  Amikacin: S <=16    -  Aztreonam: S <=4    -  Cefepime: S <=2    -  Ceftazidime: S 4    -  Ciprofloxacin: S <=0.25    Specimen Source: .Sputum Sputum    Culture Results:   Rare Pseudomonas aeruginosa  Normal Respiratory Carolyne present    Organism Identification: Pseudomonas aeruginosa    Organism: Pseudomonas aeruginosa    Method Type: RADHA      RADIOLOGY:  < from: CT Head No Cont (08.26.20 @ 01:18) >  EXAM:  CT ANGIO BRAIN (W)AW IC                        EXAM:  CT BRAIN                        EXAM:  CT ANGIO NECK (W)AW IC                        PROCEDURE DATE:  08/26/2020    INTERPRETATION:  CLINICAL INFORMATION: Focal right upper extremity tremor  TECHNIQUE:  1.  Noncontrast head CT scan was performed reconstructed into 5 mm thick axial slices.  2.  Contrast enhanced CT angiography of the neck and head was performed.   MIP reformats were generated.  Intravenous contrast:90 cc of Omnipaque-350 mg/ml were administered, and 10 cc were discarded.  COMPARISON STUDY: None.  FINDINGS:  NONCONTRAST HEAD CT SCAN:  There is no CT evidence of acute intracranial hemorrhage, mass effect or midline shift.  Gray matter-white matter differentiation is grossly preserved.  The ventricles, sulci and extra-axial spaces appear within normal limits.  The paranasal sinuses and tympanomastoid cavities are grossly clear.  There is a 1.3 cm lobulated bony density seen posteriorto the to the left IAC in the left CP angle, nonspecific, may represent meningioma. Further evaluation with MRI can be obtained..    CT ANGIOGRAPHY NECK:  Thoracic aorta and branch vessels: Three vessel arch.  No occlusion, flow limiting stenosis or dissection.    Right carotid system: No occlusion, hemodynamically significant carotid stenosis using NASCET criteria or dissection.    Left carotid system: No occlusion, hemodynamically significant carotid stenosis using NASCET criteria or dissection.    Vertebral arteries: No occlusion, flow-limiting stenosis or dissection.  Right dominant vertebral artery; hypoplastic left vertebral artery ends in a left PICA distribution..    Soft tissues of the neck: Within normal limits.    Visualized spine:Degenerative disease..    Visualized upper chest:  Within normal limits    CT ANGIOGRAPHY BRAIN:  Internal carotid arteries: No occlusion, flow-limiting stenosis or aneurysm.    Anterior cerebral arteries: No occlusion, flow-limiting stenosis or aneurysm    Middle cerebral arteries: No occlusion, flow-limiting stenosis or aneurysm.    Anterior communicating artery: Patent without aneurysm.    Posterior communicating arteries: Patent bilaterally without aneurysm.    Posterior cerebral arteries: Noocclusion, flow-limiting stenosis or aneurysm.    Vertebrobasilar: No occlusion, flow-limiting stenosis or aneurysm.  The distal vertebral arteries are codominant.  Posterior inferior cerebellar arteries, anterior inferior cerebellar arteries and superior cerebellar arteries are patent bilaterally.    IMPRESSION:  NONCONTRAST HEAD CT SCAN:  1. No CT evidence of acute intracranial hemorrhage, mass effect or acute territorial infarct.  2. There is a 1.3 cm lobulated bony density seen posterior to the to the left IAC in the left CP angle, nonspecific, may represent meningioma. Further evaluation with MRI can be obtained..    CT ANGIOGRAPHY NECK: Patent cervical vasculature.  No hemodynamically significant carotid stenosis or flow-limiting vertebral artery stenosis.  No evidence of dissection.    CT ANGIOGRAPHY BRAIN: No vessel occlusion, flow-limiting stenosis or aneurysm is identified about the [place].      ECHO  ------------------------------------------------------------------------  Dimensions:    Normal Values:  LA:     3.5    2.0 - 4.0 cm  Ao:     3.0    2.0 - 3.8 cm  SEPTUM: 1.4    0.6 - 1.2 cm  PWT:    1.3    0.6 - 1.1 cm  LVIDd:  4.0    3.0 - 5.6 cm  LVIDs:  2.4    1.8 - 4.0 cm  EF (Visual Estimate): 65-70 %  Doppler Peak Velocity (m/sec): AoV=1.6 PV=1.1  ------------------------------------------------------------------------  Observations:  Mitral Valve: Mitral annular calcification. Mild mitral  regurgitation.  Aortic Valve/Aorta: Tricuspid aortic valve with normal cusp  excursion. Peak transaortic valve gradient equals 10 mm Hg.  No aortic valve regurgitation seen.  Aortic Root: 3 cm.  Left Atrium: Normal left atrial size.  Left Ventricle: Normal overall left ventricular systolic  function with an estimated ejection fraction of 65-70%. No  overt segmental wall motion abnormalities. The apical  cavity obliterates in systole. Mildly increased left  ventricular wall thickness.  Right Heart: Right atrium not well visualized. The right  ventricle is not well visualized; overall right ventricular  systolic function appears preserved based on the views  obtained. Tricuspid valve structure appears normal. Minimal  tricuspid regurgitation. No pulmonic stenosis. Minimal  pulmonic regurgitation.  Pericardium/Pleura: No pericardial effusion seen.  ------------------------------------------------------------------------  Conclusions:  Suboptimal image quality.  1. Mildly increased left ventricular wall thickness.  2. Normal overall left ventricular systolic function with  an estimated ejection fraction of 65-70%. No overt  segmental wall motion abnormalities. The apical cavity  obliterates in systole.  3. The right ventricle is not well visualized; overall  right ventricular systolic function appears preserved based  on the views obtained. CC:  HHS, Seizure (27 Aug 2020 15:43) and encephalopathy     HPI:  69 yo female with PMHx of HTN, BERKLEY, GERD, GIant cell arthritis, Gout presenting to [facility] ED 8/25 PM with 1 week c/o generalized weakness/dysuria/polyuria/polydipsia and RUE dystonia/hand twitching. Pt found to be in hypertensive urgency (/102) s/p total Labetalol 40 IVP in ED. Pt also found to be hyperglycemic to 796 with lactic acidosis started on insulin gtt for concern of HHS vs DKA with rapid correction of blood glucose. Course c/b by acute metabolic encephalopathy/generalized seizures thought to be r/t hyperglycemic hyperosmolar state requiring urgent intubation for airway protection s/p extubation 8/27. The pt has been IV Keppra loaded. and s/p versed gtt. CT of the brain concern for  a possible meningioma, awaiting MRI brain. No active seizures have been seen on >24H EEG's, Pt periods of hypertensive episodes better controlled now s/p starting pt on Labetalol and home dose of Linsinopril in addition to Nicardipine. BG levels greatly improved.    Interval Events: Lantus was increased 34U      REVIEW OF SYSTEMS:  Constitutional: [ ] fevers [ ] chills [ ] weight loss [ ] weight gain (x)Denies  HEENT: [ ] dry eyes [ ] eye irritation [ ] postnasal drip [ ] nasal congestion(x)Denies  CV: [ ] chest pain [ ] orthopnea [ ] palpitations [ ] murmur (x)Denies  Resp: [ ] cough [ ] shortness of breath [ ] dyspnea [ ] wheezing [ ] sputum [ ] hemoptysis (x)Denies  GI: [ ] nausea [ ] vomiting [ ] diarrhea [ ] constipation [ ] abd pain [ ] dysphagia (x)Denies  : [ ] dysuria [ ] nocturia [ ] hematuria [ ] increased urinary frequency (x)Denies  Musculoskeletal: [ ] back pain [ ] myalgias [ ] arthralgias [ ] fracture (x)Denies  Skin: [ ] rash [ ] itch (x)Denies  Neurological: [ ] headache [ ] dizziness [ ] syncope [ ] weakness [ ] numbness (x)Denies  Psychiatric: [ ] anxiety [ ] depression (x)Denies  Endocrine: [ ] diabetes [ ] thyroid problem (x)Denies  Hematologic/Lymphatic: [ ] anemia [ ] bleeding problem (x)Denies  Allergic/Immunologic: [ ] itchy eyes [ ] nasal discharge [ ] hives [ ] angioedema (x)Denies  [x ] All other systems negative      OBJECTIVE:  ICU Vital Signs Last 24 Hrs  T(C): 36.9 (30 Aug 2020 04:00), Max: 37.1 (29 Aug 2020 16:30)  T(F): 98.4 (30 Aug 2020 04:00), Max: 98.7 (29 Aug 2020 16:30)  HR: 80 (30 Aug 2020 06:00) (61 - 106)  BP: 135/65 (30 Aug 2020 06:00) (115/58 - 199/79)  BP(mean): 91 (30 Aug 2020 06:00) (74 - 129)  ABP: --  ABP(mean): --  RR: 22 (30 Aug 2020 06:00) (10 - 31)  SpO2: 100% (30 Aug 2020 06:00) (96% - 100%)      I & O's    08-28 @ 07:01 - 08-29 @ 07:00  --------------------------------------------------------  IN: 2028.2 mL / OUT: 1200 mL / NET: 828.2 mL    08-29 @ 07:01 - 08-30 @ 06:14  --------------------------------------------------------  IN: 2379.8 mL / OUT: 1050 mL / NET: 1329.8 mL      CAPILLARY BLOOD GLUCOSE  POCT Blood Glucose.: 227 mg/dL (30 Aug 2020 04:58)      PHYSICAL EXAM:  General: appears calm and w/o c/o pain  HEENT: sclera clear, trachea midline  Lymph Nodes: non palp  Neck: supple  Respiratory: bilaterally equal BS / BCTA  Cardiovascular: S1S2 RRR, all pulses palp 1+/3- limited by body habitus  Abdomen: obese, soft, non distended, +distant BS hypoactive  Extremities: edema - non pitting x 4  Skin: warm, dry  Neurological: +PERRL, neg nystagmus, speech is clear, following all commands appropriately, bilaterally equal strength and mobility x 4  Psychiatry: unable to assess    LINES: [facility] MEDICATIONS:  MEDICATIONS  (STANDING):  allopurinol 100 milliGRAM(s) Oral every 12 hours  aspirin  chewable 81 milliGRAM(s) Oral daily  atorvastatin 80 milliGRAM(s) Oral at bedtime  brimonidine 0.2% Ophthalmic Solution 1 Drop(s) Both EYES two times a day  cefepime   IVPB 1000 milliGRAM(s) IV Intermittent every 8 hours  cefepime   IVPB      chlorhexidine 4% Liquid 1 Application(s) Topical <User Schedule>  dextrose 5%. 1000 milliLiter(s) (50 mL/Hr) IV Continuous <Continuous>  dextrose 50% Injectable 12.5 Gram(s) IV Push once  dextrose 50% Injectable 25 Gram(s) IV Push once  dextrose 50% Injectable 25 Gram(s) IV Push once  enoxaparin Injectable 40 milliGRAM(s) SubCutaneous every 12 hours  insulin glargine Injectable (LANTUS) 34 Unit(s) SubCutaneous at bedtime  insulin lispro (HumaLOG) corrective regimen sliding scale   SubCutaneous every 6 hours  latanoprost 0.005% Ophthalmic Solution 1 Drop(s) Both EYES at bedtime  levETIRAcetam  IVPB 1000 milliGRAM(s) IV Intermittent every 12 hours  lisinopril 40 milliGRAM(s) Oral daily  niCARdipine 40 milliGRAM(s) Oral three times a day  pantoprazole  Injectable 40 milliGRAM(s) IV Push daily  polyethylene glycol 3350 17 Gram(s) Oral daily    MEDICATIONS  (PRN):  dextrose 40% Gel 15 Gram(s) Oral once PRN Blood Glucose LESS THAN 70 milliGRAM(s)/deciLiter  glucagon  Injectable 1 milliGRAM(s) IntraMuscular once PRN Glucose <70 milliGRAM(s)/deciLiter  labetalol Injectable 10 milliGRAM(s) IV Push every 6 hours PRN BP > 180  melatonin 3 milliGRAM(s) Oral at bedtime PRN Insomnia      LABS:                        12.0   10.47 )-----------( 133      ( 29 Aug 2020 23:57 )             37.9     Hgb Trend: 12.0<--, 11.5<--, 12.3<--, 13.5<--, 12.9<--  08-29    147<H>  |  106  |  17  ----------------------------<  273<H>  3.7   |  25  |  0.85    Ca    8.3<L>      29 Aug 2020 23:57  Phos  2.3     08-29  Mg     1.9     08-29      Creatinine Trend: 0.85<--, 0.81<--, 0.79<--, 0.80<--, 0.76<--, 0.80<--  PT/INR - ( 29 Aug 2020 23:57 )   PT: 13.3 sec;   INR: 1.12 ratio    PTT - ( 29 Aug 2020 23:57 )  PTT:33.2 sec      MICROBIOLOGY:     MICROBIOLOGY:   Culture - Sputum . (08.26.20 @ 17:11)    -  Gentamicin: S 4    -  Imipenem: S <=1    -  Levofloxacin: S <=0.5    -  Meropenem: S <=1    -  Piperacillin/Tazobactam: S <=8    -  Tobramycin: S <=2    Gram Stain:   Few polymorphonuclear leukocytes per low power field  No Squamous epithelial cells per low power field  Few Gram Negative Rods per oil power field    -  Amikacin: S <=16    -  Aztreonam: S <=4    -  Cefepime: S <=2    -  Ceftazidime: S 4    -  Ciprofloxacin: S <=0.25    Specimen Source: .Sputum Sputum    Culture Results:   Rare Pseudomonas aeruginosa  Normal Respiratory Carolyne present    Organism Identification: Pseudomonas aeruginosa    Organism: Pseudomonas aeruginosa    Method Type: RADHA      RADIOLOGY:  < from: CT Head No Cont (08.26.20 @ 01:18) >  EXAM:  CT ANGIO BRAIN (W)AW IC                        EXAM:  CT BRAIN                        EXAM:  CT ANGIO NECK (W)AW IC                        PROCEDURE DATE:  08/26/2020    INTERPRETATION:  CLINICAL INFORMATION: Focal right upper extremity tremor  TECHNIQUE:  1.  Noncontrast head CT scan was performed reconstructed into 5 mm thick axial slices.  2.  Contrast enhanced CT angiography of the neck and head was performed.   MIP reformats were generated.  Intravenous contrast:90 cc of Omnipaque-350 mg/ml were administered, and 10 cc were discarded.  COMPARISON STUDY: None.  FINDINGS:  NONCONTRAST HEAD CT SCAN:  There is no CT evidence of acute intracranial hemorrhage, mass effect or midline shift.  Gray matter-white matter differentiation is grossly preserved.  The ventricles, sulci and extra-axial spaces appear within normal limits.  The paranasal sinuses and tympanomastoid cavities are grossly clear.  There is a 1.3 cm lobulated bony density seen posteriorto the to the left IAC in the left CP angle, nonspecific, may represent meningioma. Further evaluation with MRI can be obtained..    CT ANGIOGRAPHY NECK:  Thoracic aorta and branch vessels: Three vessel arch.  No occlusion, flow limiting stenosis or dissection.    Right carotid system: No occlusion, hemodynamically significant carotid stenosis using NASCET criteria or dissection.    Left carotid system: No occlusion, hemodynamically significant carotid stenosis using NASCET criteria or dissection.    Vertebral arteries: No occlusion, flow-limiting stenosis or dissection.  Right dominant vertebral artery; hypoplastic left vertebral artery ends in a left PICA distribution..    Soft tissues of the neck: Within normal limits.    Visualized spine:Degenerative disease..    Visualized upper chest:  Within normal limits    CT ANGIOGRAPHY BRAIN:  Internal carotid arteries: No occlusion, flow-limiting stenosis or aneurysm.    Anterior cerebral arteries: No occlusion, flow-limiting stenosis or aneurysm    Middle cerebral arteries: No occlusion, flow-limiting stenosis or aneurysm.    Anterior communicating artery: Patent without aneurysm.    Posterior communicating arteries: Patent bilaterally without aneurysm.    Posterior cerebral arteries: Noocclusion, flow-limiting stenosis or aneurysm.    Vertebrobasilar: No occlusion, flow-limiting stenosis or aneurysm.  The distal vertebral arteries are codominant.  Posterior inferior cerebellar arteries, anterior inferior cerebellar arteries and superior cerebellar arteries are patent bilaterally.    IMPRESSION:  NONCONTRAST HEAD CT SCAN:  1. No CT evidence of acute intracranial hemorrhage, mass effect or acute territorial infarct.  2. There is a 1.3 cm lobulated bony density seen posterior to the to the left IAC in the left CP angle, nonspecific, may represent meningioma. Further evaluation with MRI can be obtained..    CT ANGIOGRAPHY NECK: Patent cervical vasculature.  No hemodynamically significant carotid stenosis or flow-limiting vertebral artery stenosis.  No evidence of dissection.    CT ANGIOGRAPHY BRAIN: No vessel occlusion, flow-limiting stenosis or aneurysm is identified about the [place].      ECHO  ------------------------------------------------------------------------  Dimensions:    Normal Values:  LA:     3.5    2.0 - 4.0 cm  Ao:     3.0    2.0 - 3.8 cm  SEPTUM: 1.4    0.6 - 1.2 cm  PWT:    1.3    0.6 - 1.1 cm  LVIDd:  4.0    3.0 - 5.6 cm  LVIDs:  2.4    1.8 - 4.0 cm  EF (Visual Estimate): 65-70 %  Doppler Peak Velocity (m/sec): AoV=1.6 PV=1.1  ------------------------------------------------------------------------  Observations:  Mitral Valve: Mitral annular calcification. Mild mitral  regurgitation.  Aortic Valve/Aorta: Tricuspid aortic valve with normal cusp  excursion. Peak transaortic valve gradient equals 10 mm Hg.  No aortic valve regurgitation seen.  Aortic Root: 3 cm.  Left Atrium: Normal left atrial size.  Left Ventricle: Normal overall left ventricular systolic  function with an estimated ejection fraction of 65-70%. No  overt segmental wall motion abnormalities. The apical  cavity obliterates in systole. Mildly increased left  ventricular wall thickness.  Right Heart: Right atrium not well visualized. The right  ventricle is not well visualized; overall right ventricular  systolic function appears preserved based on the views  obtained. Tricuspid valve structure appears normal. Minimal  tricuspid regurgitation. No pulmonic stenosis. Minimal  pulmonic regurgitation.  Pericardium/Pleura: No pericardial effusion seen.  ------------------------------------------------------------------------  Conclusions:  Suboptimal image quality.  1. Mildly increased left ventricular wall thickness.  2. Normal overall left ventricular systolic function with  an estimated ejection fraction of 65-70%. No overt  segmental wall motion abnormalities. The apical cavity  obliterates in systole.  3. The right ventricle is not well visualized; overall  right ventricular systolic function appears preserved based  on the views obtained.

## 2020-08-30 NOTE — PROGRESS NOTE ADULT - ASSESSMENT
69 yo female with PMHx of HTN, BERKLEY, GERD, Giant cell Arthritis, Gout presents to [facility] 8/25 in a state of HTN Urgency, Hyperglycemic Hyperosmolar Non Ketotic Acidosis, w Lactic Acidosis s/p Seizure 2/2 hyperglycemic / hyperosmolar shift requiring intubation. HHNK resolved, awaiting MRI brain to r/o meningioma.    Plan:    #Neuro: Generalized seizure/Encephalopathy 2/2 hyperglycemic / hyperosmolar shift now new loculated density in the L IAC concerning for meningioma awaiting MRI brain  - no h/o seizure reported- no further evidence of seizure activity on EEG's  - c/w Keppra 1gm/q12H- if no further seizures and no evidence of meningeoma- f/u Neuro to possibly decrease Keppra dosing   -f/u Neuro  -c/w ASA and Statin  -c/w neuro checks as per protocol    #Pulm: Acute hypoxemic Respiratory Failure s/p intubation for airway protection 2/2 AMS / encephalopathy /generalized seizure activity s/p extubation 8/27- doing well on RA  -c/w RA- O2 sats %    -aspiration precautions  -Incentive spirometry      #CV: A/W Hypertensive urgency () s/p total Labetalol 40 IV on admission s/p cardene gtt off since yesterday bridged over to nicardipine PO  HTN episode - given Labetolol 10mg x 1 IV-   -c/w Labetalol PRN- 10mg IVP w paratmeters  -restart pt home dose lisinopril      #GI/: No active issues  - pt has been NPO- keofeed in place- start  TF- trickle feeds for today advance as tolerated  - speech/swallow eval  - c/w PPI daily    #ID: Afebrile, no leukocytosis at presentation   - started on Ceftriaxone 2g qd for empiric meningitis coverage on admission s/p de-escalation to 1mg   -+pseudomonas A. sputum- d/c ceftriaxone- start cefepime - 5day coure    #FEN/ENDO: Hyperglycemic Hyperosmolar State vs DKA / HHNK s/p insulin gtt transitioned to HISS and lantus  -c/w HISS  -c/w Lantus     #HEME:  No active issues  - DVT ppx with Lovenox 40q12 69 yo female with PMHx of HTN, BERKLEY, GERD, Giant cell Arthritis, Gout presents to [facility] 8/25 in a state of HTN Urgency, Hyperglycemic Hyperosmolar Non Ketotic Acidosis, w Lactic Acidosis s/p Seizure 2/2 hyperglycemic / hyperosmolar shift requiring intubation. HHNK resolved, awaiting MRI brain to r/o meningioma.    Plan:    #Neuro: Generalized seizure/Encephalopathy 2/2 hyperglycemic / hyperosmolar shift now new loculated density in the L IAC concerning for meningioma awaiting MRI brain  - no h/o seizure reported- no further evidence of seizure activity on EEG's  - c/w Keppra 1gm/q12H- if no further seizures and no evidence of meningeoma- f/u Neuro to possibly decrease Keppra dosing   -f/u Neuro  -c/w ASA and Statin  -c/w neuro checks as per protocol    #Pulm: Acute hypoxemic Respiratory Failure s/p intubation for airway protection 2/2 AMS / encephalopathy /generalized seizure activity s/p extubation 8/27- doing well on RA  -c/w RA- O2 sats %    -aspiration precautions  -Incentive spirometry  -Chest PT      #CV: A/W Hypertensive urgency () s/p total Labetalol 40 IV on admission s/p cardene gtt off since yesterday bridged over to nicardipine PO  -c/w home dose Lisinopril - home dose  -c/w Labetalol PRN- 10mg IVP w paratmeters  -c/w Nicardipine      #GI/: No active issues  - pt has been NPO- keofeed in place- tolerated TF- passed BS swallow- start PO soft diet / diabetic  -c/w bowel regimen  - c/w PPI daily    #ID: Afebrile, no leukocytosis at presentation had been initially empirically treated with ceftriaxone for suspicion of meningitis then at a lower dose of aspiration pna now d/c'd    -+pseudomonas A. in the Sputum - c/w Cefepime for 5 days today is day #2/5     #FEN/ENDO: Hyperglycemic Hyperosmolar State vs DKA / HHNK s/p insulin gtt transitioned to HISS and lantus- now resolved  -c/w HISS  -c/w Lantus   -start pre-meal Humalog coverage    #HEME:  No active issues  - DVT ppx with Lovenox 40q12 69 yo female with PMHx of HTN, BERKLEY, GERD, Giant cell Arthritis, Gout presents to [facility] 8/25 in a state of HTN Urgency, Hyperglycemic Hyperosmolar Non Ketotic Acidosis, w Lactic Acidosis s/p Seizure 2/2 hyperglycemic / hyperosmolar shift requiring intubation. HHNK resolved, awaiting MRI brain to r/o meningioma.    Plan:    #Neuro: Generalized seizure/Encephalopathy 2/2 hyperglycemic / hyperosmolar shift now new loculated density in the L IAC concerning for meningioma awaiting MRI brain  - no h/o seizure reported- no further evidence of seizure activity on EEG's  - c/w Keppra 1gm/q12H- if no further seizures and no evidence of meningeoma- f/u Neuro to possibly decrease Keppra dosing   -f/u Neuro  -c/w ASA and Statin  -c/w neuro checks as per protocol    #Pulm: Acute hypoxemic Respiratory Failure s/p intubation for airway protection 2/2 AMS / encephalopathy /generalized seizure activity s/p extubation 8/27- doing well on RA  -c/w RA- O2 sats %    -aspiration precautions  -Incentive spirometry  -Chest PT      #CV: A/W Hypertensive urgency () s/p total Labetalol 40 IV on admission s/p cardene gtt off since yesterday bridged over to nicardipine PO  -c/w home dose Lisinopril - home dose  -c/w Labetalol PRN- 10mg IVP w paratmeters  -c/w Nicardipine      #GI/: No active issues  - pt has been NPO- keofeed in place- tolerated TF- passed BS swallow- start PO soft diet / diabetic  -c/w bowel regimen  - c/w PPI daily    #ID: Afebrile, no leukocytosis at presentation had been initially empirically treated with ceftriaxone for suspicion of meningitis then at a lower dose of aspiration pna now d/c'd    -+pseudomonas A. in the Sputum - c/w Cefepime for 5 days today is day #2/5     #FEN/ENDO: Hyperglycemic Hyperosmolar State vs DKA / HHNK s/p insulin gtt transitioned to HISS and lantus- now resolved  -c/w HISS   -start pre-meal Humolog  -c/w Lantus q HS  -start pre-meal Humalog coverage  -f/u Endocrine    #HEME:  No active issues  - DVT ppx with Lovenox 40q12     PT Consult

## 2020-08-30 NOTE — PROGRESS NOTE ADULT - ATTENDING COMMENTS
Pt seen and examined. 70 F with medical history as noted above, admitted to MICU with hypertensive urgency and DKA with course complicated by a seizure, acute resp failure with hypoxia requiring intubation and pseudomonas pneumonia. Mental status continues to improve. Resp status remains stable. Cont Cefepime for pseudomonas PNA. VEEG negative for seizures. Cont Keppra BID. Ct head with possible meningioma, requires MRI once stable. Cont Lisinopril and PO Nicardipine for hypertension. DKA resolved, cont Lantus and insulin S/S coverage per Endo. Prognosis guarded.

## 2020-08-30 NOTE — PHYSICAL THERAPY INITIAL EVALUATION ADULT - ADDITIONAL COMMENTS
Pt lives with her daughter in a house with 4 steps to enter, +HR and 13 steps inside, +HR. Pt was independent with all ADLs and ambulation PTA. Pt did not use a AD for ambulation PTA. Pt's daughter assists pt when needed. +reading eyeglasses.

## 2020-08-30 NOTE — PROGRESS NOTE ADULT - ATTENDING COMMENTS
Bailey Hill MD  Pager [phone] ([facility])/ [phone] ([facility]) [please provide 10 digit call back number]  Nights and weekends: [phone]  Please note that this patient may be followed by a different provider tomorrow. If no answer or after hours, please contact [phone].  For final dc reccomendations, please call [phone] or page the endocrine fellow on call.    Spent over 40 minutes assessing pt/labs/meds and discussing plan of care with primary team and patient and family   (high risk patient with hyperglycemia high risk for CAD and CVA with high level decision-making).

## 2020-08-30 NOTE — PHYSICAL THERAPY INITIAL EVALUATION ADULT - PRECAUTIONS/LIMITATIONS, REHAB EVAL
fall precautions/TTE 8/26/2020: Mildly increased left ventricular wall thickness. +Head CT 8/26/2020: There is a 1.3 cm lobulated bony density seen posterior to the to the left IAC in the left CP angle, nonspecific, may represent meningioma.

## 2020-08-30 NOTE — PROGRESS NOTE ADULT - ASSESSMENT
71 y/o F with history of HTN, Gout, obesity, presenting w/ b/l ankle swelling, fatigue/sleepiness and generalized weakness x several days. Found to have serum BG of 796, AG 22, bicarb 21, BHB2.3, lactate 9, WBC 10.88. UA negative for UTI. CXR showed clear lungs. CTH showing possible meningioma. Given 2L IVF bolus and  admitted to MICU where she was started on insulin drip and IVF. CCB seizure while in MICU possibly due to rapid correction of BG, intubated for airway protection.  Endocrinology consulted for concern for DKA/HHS in the setting of newly diagnosed DM (high risk patient with DKA with seizure with high level decision-making). DKA resolved. Now extubated, off insulin drip, transitioned to PO diet.       Problem/Recommendation - 1:  Problem: Type 2 diabetes mellitus with hyperglycemia, without long-term current use of insulin. Recommendation: Newly diagnosed diabetes with HbA1C 9.0 presenting with DKA, suspect ketosis prone type 2 given obesity. However, given acute development of marked hyperglycemia and diabetes, other possible etiologies include DENTON and pancreatic cancer. DKA resolved. Pt now on PO diet.  - recommend Lantus 30 units with Humalog 8 TID pre-meal with low pre-meal Humalog correction scale and separate low bed time Humalog correction scale   IF pt is on clear diet:  - recommend Lantus 36 units qhs   - Humalog 8 units TID pre-meal  - low pre-meal Humalog correction scale and separate low bed time Humalog correction scale    - f/u CATY antibody, Islet cell antibody, insulin antibody to r/o DENTON  - consider abdominal imaging to r/o pancreatic cancer   Discharge:  - Plan TBD based on further data.     Problem/Recommendation - 2:  ·  Problem: Diabetic ketoacidosis without coma associated with type 2 diabetes mellitus.  Recommendation: management as above.      Problem/Recommendation - 3:  ·  Problem: Hypertension, unspecified type.  Recommendation: - goal BP <130/80  - management per primary team      Lizbeth Wells DO, Endocrine Fellow   Pager [phone]. from 9-5PM. After hours and on weekends please call [phone]. 71 y/o F with history of HTN, Gout, obesity, presenting w/ b/l ankle swelling, fatigue/sleepiness and generalized weakness x several days. Found to have serum BG of 796, AG 22, bicarb 21, BHB2.3, lactate 9, WBC 10.88. UA negative for UTI. CXR showed clear lungs. CTH showing possible meningioma. Given 2L IVF bolus and  admitted to MICU where she was started on insulin drip and IVF. CCB seizure while in MICU possibly due to rapid correction of BG, intubated for airway protection.  Endocrinology consulted for concern for DKA/HHS in the setting of newly diagnosed DM (high risk patient with DKA with seizure with high level decision-making). DKA resolved. Now extubated, off insulin drip, transitioned to PO diet.       Problem/Recommendation - 1:  Problem: Type 2 diabetes mellitus with hyperglycemia, without long-term current use of insulin. Recommendation: Newly diagnosed diabetes with HbA1C 9.0 presenting with DKA, suspect ketosis prone type 2 given obesity. However, given acute development of marked hyperglycemia and diabetes, other possible etiologies include DENTON and pancreatic cancer. DKA resolved. Pt now on PO diet.  - recommend Lantus 36 units qhs   - Humalog 12 units TID pre-meal  - low pre-meal Humalog correction scale and separate low bed time Humalog correction scale    - f/u CATY antibody, Islet cell antibody, insulin antibody to r/o DENTON  - consider abdominal imaging to r/o pancreatic cancer   Discharge:  - Plan TBD based on further data.     Problem/Recommendation - 2:  ·  Problem: Diabetic ketoacidosis without coma associated with type 2 diabetes mellitus.  Recommendation: management as above.      Problem/Recommendation - 3:  ·  Problem: Hypertension, unspecified type.  Recommendation: - goal BP <130/80  - management per primary team      Lizbeth Wells DO, Endocrine Fellow   Pager [phone]. from 9-5PM. After hours and on weekends please call [phone]. 71 y/o F with history of HTN, Gout, obesity, presenting w/ b/l ankle swelling, fatigue/sleepiness and generalized weakness x several days. Found to have serum BG of 796, AG 22, bicarb 21, BHB2.3, lactate 9, WBC 10.88. UA negative for UTI. CXR showed clear lungs. CTH showing possible meningioma. Given 2L IVF bolus and  admitted to MICU where she was started on insulin drip and IVF. CCB seizure while in MICU possibly due to rapid correction of BG, intubated for airway protection.  Endocrinology consulted for concern for DKA/HHS in the setting of newly diagnosed DM (high risk patient with DKA with seizure with high level decision-making). DKA resolved. Now extubated, off insulin drip, transitioned to PO diet.       Problem/Recommendation - 1:  Problem: Type 2 diabetes mellitus with hyperglycemia, without long-term current use of insulin. Recommendation: Newly diagnosed diabetes with HbA1C 9.0 presenting with DKA, suspect ketosis prone type 2 given obesity. However, given acute development of marked hyperglycemia and diabetes, other possible etiologies include DENTON and pancreatic cancer. DKA resolved. Pt now on PO diet.  - can continue Lantus 38 units qhs   - recommend increasing Humalog to 12 units TID pre-meal  - low pre-meal Humalog correction scale and separate low bed time Humalog correction scale   - f/u CATY antibody, Islet cell antibody, insulin antibody to r/o DENTON  - consider abdominal imaging to r/o pancreatic cancer   Discharge:  - Plan TBD based on further data.     Problem/Recommendation - 2:  ·  Problem: Diabetic ketoacidosis without coma associated with type 2 diabetes mellitus.  Recommendation: management as above.      Problem/Recommendation - 3:  ·  Problem: Hypertension, unspecified type.  Recommendation: - goal BP <130/80  - management per primary team      Lizbeth Wells DO, Endocrine Fellow   Pager [phone]. from 9-5PM. After hours and on weekends please call [phone]. 69 y/o F with history of HTN, Gout, obesity, presenting w/ b/l ankle swelling, fatigue/sleepiness and generalized weakness x several days. Found to have serum BG of 796, AG 22, bicarb 21, BHB2.3, lactate 9, WBC 10.88. UA negative for UTI. CXR showed clear lungs. CTH showing possible meningioma. Given 2L IVF bolus and  admitted to MICU where she was started on insulin drip and IVF. CCB seizure while in MICU possibly due to rapid correction of BG, intubated for airway protection.  Endocrinology consulted for concern for DKA/HHS in the setting of newly diagnosed DM (high risk patient with DKA with seizure with high level decision-making). DKA resolved. Now extubated, off insulin drip, transitioned to PO diet.       Problem/Recommendation - 1:  Problem: Type 2 diabetes mellitus with hyperglycemia, without long-term current use of insulin. Recommendation: Newly diagnosed diabetes with HbA1C 9.0 presenting with DKA, suspect ketosis prone type 2 given obesity. However, given acute development of marked hyperglycemia and diabetes, other possible etiologies include DENTON and pancreatic cancer. DKA resolved. Pt now on PO diet.  - can continue Lantus 38 units qhs   - recommend increasing Humalog to 12 units TID pre-meal  - low pre-meal Humalog correction scale and separate low bed time Humalog correction scale   - f/u CATY antibody, Islet cell antibody, insulin antibody to r/o DENTON  - consider abdominal imaging to r/o pancreatic cancer   Discharge:  - Plan TBD based on further data.     Problem/Recommendation - 2:  ·  Problem: Diabetic ketoacidosis without coma associated with type 2 diabetes mellitus.  Recommendation: management as above.      Problem/Recommendation - 3:  ·  Problem: Hypertension, unspecified type.  Recommendation: - goal BP <130/80  - management per primary team    D/W Team    Lizbeth Wells DO, Endocrine Fellow   Pager [phone]. from 9-5PM. After hours and on weekends please call [phone].

## 2020-08-30 NOTE — PHYSICAL THERAPY INITIAL EVALUATION ADULT - PLANNED THERAPY INTERVENTIONS, PT EVAL
strengthening/bed mobility training/transfer training/GOAL: Stair Negotiation Training: Patient will be able to negotiate up & down 1 flight of stairs with unilateral rail, step to gait pattern, in 4 weeks./balance training/gait training

## 2020-08-30 NOTE — PHYSICAL THERAPY INITIAL EVALUATION ADULT - GENERAL OBSERVATIONS, REHAB EVAL
Pt received semisupine in bed with +cardiac monitor, +BP cuff, +NGT, +pulse ox, +IVL and +ext female catheter. NAD noted.

## 2020-08-30 NOTE — PROGRESS NOTE ADULT - SUBJECTIVE AND OBJECTIVE BOX
Chief Complaint: new onset DM presenting with DKA    History: Pt seen and examined. Sitting in chair. States she feels better. Now on PO diet. States her appetite is good and ate her full breakfast.    MEDICATIONS  (STANDING):  allopurinol 100 milliGRAM(s) Oral every 12 hours  aspirin  chewable 81 milliGRAM(s) Oral daily  atorvastatin 80 milliGRAM(s) Oral at bedtime  brimonidine 0.2% Ophthalmic Solution 1 Drop(s) Both EYES two times a day  cefepime   IVPB 2000 milliGRAM(s) IV Intermittent every 8 hours  chlorhexidine 4% Liquid 1 Application(s) Topical <User Schedule>  dextrose 5%. 1000 milliLiter(s) (50 mL/Hr) IV Continuous <Continuous>  dextrose 50% Injectable 12.5 Gram(s) IV Push once  dextrose 50% Injectable 25 Gram(s) IV Push once  dextrose 50% Injectable 25 Gram(s) IV Push once  enoxaparin Injectable 40 milliGRAM(s) SubCutaneous every 12 hours  insulin glargine Injectable (LANTUS) 38 Unit(s) SubCutaneous at bedtime  insulin lispro (HumaLOG) corrective regimen sliding scale   SubCutaneous every 6 hours  insulin lispro Injectable (HumaLOG) 8 Unit(s) SubCutaneous three times a day with meals  latanoprost 0.005% Ophthalmic Solution 1 Drop(s) Both EYES at bedtime  levETIRAcetam  IVPB 1000 milliGRAM(s) IV Intermittent every 12 hours  lisinopril 40 milliGRAM(s) Oral daily  niCARdipine 40 milliGRAM(s) Oral three times a day  polyethylene glycol 3350 17 Gram(s) Oral daily    MEDICATIONS  (PRN):  dextrose 40% Gel 15 Gram(s) Oral once PRN Blood Glucose LESS THAN 70 milliGRAM(s)/deciLiter  glucagon  Injectable 1 milliGRAM(s) IntraMuscular once PRN Glucose <70 milliGRAM(s)/deciLiter  labetalol Injectable 10 milliGRAM(s) IV Push every 6 hours PRN BP > 180  melatonin 3 milliGRAM(s) Oral at bedtime PRN Insomnia      PHYSICAL EXAM:  Vital Signs Last 24 Hrs  T(C): 36.4 (30 Aug 2020 12:00), Max: 37.1 (29 Aug 2020 16:30)  T(F): 97.5 (30 Aug 2020 12:00), Max: 98.7 (29 Aug 2020 16:30)  HR: 87 (30 Aug 2020 14:00) (72 - 106)  BP: 141/61 (30 Aug 2020 14:00) (115/59 - 199/79)  BP(mean): 88 (30 Aug 2020 14:00) (80 - 120)  RR: 24 (30 Aug 2020 14:00) (10 - 31)  SpO2: 100% (30 Aug 2020 09:00) (97% - 100%)  GENERAL: awake, alert, NAD, obese, sitting in chair  EYES: No proptosis, no lid lag, anicteric  THYROID: Normal size, no palpable nodules  RESPIRATORY: CTA b/l, no wheezes or crackles   CARDIOVASCULAR: S1S2 RRR; no murmurs; no peripheral edema  GI: Soft, nontender, non distended, normal bowel sounds  SKIN: Dry, intact, No rashes or lesions  PSYCH: agitated   Neuro: AAOx3, conversive     CAPILLARY BLOOD GLUCOSE    POCT Blood Glucose.: 335 mg/dL (30 Aug 2020 12:05)  POCT Blood Glucose.: 227 mg/dL (30 Aug 2020 04:58)  POCT Blood Glucose.: 235 mg/dL (29 Aug 2020 23:10)  POCT Blood Glucose.: 223 mg/dL (29 Aug 2020 18:17)        08-29    147<H>  |  106  |  17  ----------------------------<  273<H>  3.7   |  25  |  0.85    Ca    8.3<L>      29 Aug 2020 23:57  Phos  2.3     08-29  Mg     1.9     08-29                              12.0   10.47 )-----------( 133      ( 29 Aug 2020 23:57 )             37.9               Thyroid Function Tests:

## 2020-08-30 NOTE — CHART NOTE - NSCHARTNOTEFT_GEN_A_CORE
MICU Transfer Note    Transfer from: MICU    Transfer to: ( x ) Medicine    (  ) Telemetry     (   ) RCU        (    ) Palliative         (   ) Stroke Unit          (   ) __________________    Accepting physician: Dr. Eris BARNES COURSE:          ASSESSMENT & PLAN:             For Followup:          Vital Signs Last 24 Hrs  T(C): 36.4 (30 Aug 2020 12:00), Max: 37.1 (29 Aug 2020 16:30)  T(F): 97.5 (30 Aug 2020 12:00), Max: 98.7 (29 Aug 2020 16:30)  HR: 87 (30 Aug 2020 14:00) (72 - 106)  BP: 141/61 (30 Aug 2020 14:00) (115/59 - 199/79)  BP(mean): 88 (30 Aug 2020 14:00) (80 - 120)  RR: 24 (30 Aug 2020 14:00) (10 - 31)  SpO2: 100% (30 Aug 2020 09:00) (97% - 100%)  I&O's Summary    29 Aug 2020 07:01  -  30 Aug 2020 07:00  --------------------------------------------------------  IN: 2494.8 mL / OUT: 1050 mL / NET: 1444.8 mL    30 Aug 2020 07:01  -  30 Aug 2020 15:06  --------------------------------------------------------  IN: 470 mL / OUT: 0 mL / NET: 470 mL        MEDICATIONS  (STANDING):  allopurinol 100 milliGRAM(s) Oral every 12 hours  aspirin  chewable 81 milliGRAM(s) Oral daily  atorvastatin 80 milliGRAM(s) Oral at bedtime  brimonidine 0.2% Ophthalmic Solution 1 Drop(s) Both EYES two times a day  cefepime   IVPB 2000 milliGRAM(s) IV Intermittent every 8 hours  chlorhexidine 4% Liquid 1 Application(s) Topical <User Schedule>  dextrose 5%. 1000 milliLiter(s) (50 mL/Hr) IV Continuous <Continuous>  dextrose 50% Injectable 12.5 Gram(s) IV Push once  dextrose 50% Injectable 25 Gram(s) IV Push once  dextrose 50% Injectable 25 Gram(s) IV Push once  enoxaparin Injectable 40 milliGRAM(s) SubCutaneous every 12 hours  insulin glargine Injectable (LANTUS) 38 Unit(s) SubCutaneous at bedtime  insulin lispro (HumaLOG) corrective regimen sliding scale   SubCutaneous every 6 hours  insulin lispro Injectable (HumaLOG) 8 Unit(s) SubCutaneous three times a day with meals  latanoprost 0.005% Ophthalmic Solution 1 Drop(s) Both EYES at bedtime  levETIRAcetam  IVPB 1000 milliGRAM(s) IV Intermittent every 12 hours  lisinopril 40 milliGRAM(s) Oral daily  niCARdipine 40 milliGRAM(s) Oral three times a day  polyethylene glycol 3350 17 Gram(s) Oral daily    MEDICATIONS  (PRN):  dextrose 40% Gel 15 Gram(s) Oral once PRN Blood Glucose LESS THAN 70 milliGRAM(s)/deciLiter  glucagon  Injectable 1 milliGRAM(s) IntraMuscular once PRN Glucose <70 milliGRAM(s)/deciLiter  labetalol Injectable 10 milliGRAM(s) IV Push every 6 hours PRN BP > 180  melatonin 3 milliGRAM(s) Oral at bedtime PRN Insomnia        LABS                                            12.0                  Neurophils% (auto):   x      (08-29 @ 23:57):    10.47)-----------(133          Lymphocytes% (auto):  x                                             37.9                   Eosinphils% (auto):   x        Manual%: Neutrophils x    ; Lymphocytes x    ; Eosinophils x    ; Bands%: x    ; Blasts x                                    147    |  106    |  17                  Calcium: 8.3   / iCa: x      (08-29 @ 23:57)    ----------------------------<  273       Magnesium: 1.9                              3.7     |  25     |  0.85             Phosphorous: 2.3        ( 08-29 @ 23:57 )   PT: 13.3 sec;   INR: 1.12 ratio  aPTT: 33.2 sec MICU Transfer Note    Transfer from: [facility]    Transfer to: ( x ) Medicine    (  ) Telemetry     (   ) RCU        (    ) Palliative         (   ) Stroke Unit          (   ) __________________    Accepting physician[facility] Dr. Schulte      [facility]    69 yo female with PMHx of HTN, BERKLEY, GERD, GIant cell arthritis, Gout presenting to [facility] ED 8/25 PM with 1 week c/o generalized weakness/dysuria/polyuria/polydipsia and RUE dystonia/hand twitching. Pt found to be in hypertensive urgency (/102) s/p total Labetalol 40 IVP in ED. Pt also found to be hyperglycemic to 796 with lactic acidosis started on insulin gtt for concern of HHS vs DKA with rapid correction of blood glucose. Course c/b by acute metabolic encephalopathy/generalized seizures thought to be r/t hyperglycemic hyperosmolar state requiring urgent intubation for airway protection s/p extubation 8/27. The pt has been IV Keppra loaded. and s/p versed gtt. CT of the brain concern for  a possible meningioma, awaiting MRI brain. No active seizures have been seen on >24H EEG's, Pt periods of hypertensive episodes better controlled now s/p starting pt on Labetalol and home dose of Linsinopril in addition to Nicardipine. BG levels greatly improved.      Vital Signs Last 24 Hrs  T(C): 36.4 (30 Aug 2020 12:00), Max: 37.1 (29 Aug 2020 16:30)  T(F): 97.5 (30 Aug 2020 12:00), Max: 98.7 (29 Aug 2020 16:30)  HR: 87 (30 Aug 2020 14:00) (72 - 106)  BP: 141/61 (30 Aug 2020 14:00) (115/59 - 199/79)  BP(mean): 88 (30 Aug 2020 14:00) (80 - 120)  RR: 24 (30 Aug 2020 14:00) (10 - 31)  SpO2: 100% (30 Aug 2020 09:00) (97% - 100%)  I&O's Summary    29 Aug 2020 07:01  -  30 Aug 2020 07:00  --------------------------------------------------------  IN: 2494.8 mL / OUT: 1050 mL / NET: 1444.8 mL    30 Aug 2020 07:01  -  30 Aug 2020 15:06  --------------------------------------------------------  IN: 470 mL / OUT: 0 mL / NET: 470 mL        MEDICATIONS  (STANDING):  allopurinol 100 milliGRAM(s) Oral every 12 hours  aspirin  chewable 81 milliGRAM(s) Oral daily  atorvastatin 80 milliGRAM(s) Oral at bedtime  brimonidine 0.2% Ophthalmic Solution 1 Drop(s) Both EYES two times a day  cefepime   IVPB 2000 milliGRAM(s) IV Intermittent every 8 hours  chlorhexidine 4% Liquid 1 Application(s) Topical <User Schedule>  dextrose 5%. 1000 milliLiter(s) (50 mL/Hr) IV Continuous <Continuous>  dextrose 50% Injectable 12.5 Gram(s) IV Push once  dextrose 50% Injectable 25 Gram(s) IV Push once  dextrose 50% Injectable 25 Gram(s) IV Push once  enoxaparin Injectable 40 milliGRAM(s) SubCutaneous every 12 hours  insulin glargine Injectable (LANTUS) 38 Unit(s) SubCutaneous at bedtime  insulin lispro (HumaLOG) corrective regimen sliding scale   SubCutaneous every 6 hours  insulin lispro Injectable (HumaLOG) 8 Unit(s) SubCutaneous three times a day with meals  latanoprost 0.005% Ophthalmic Solution 1 Drop(s) Both EYES at bedtime  levETIRAcetam  IVPB 1000 milliGRAM(s) IV Intermittent every 12 hours  lisinopril 40 milliGRAM(s) Oral daily  niCARdipine 40 milliGRAM(s) Oral three times a day  polyethylene glycol 3350 17 Gram(s) Oral daily    MEDICATIONS  (PRN):  dextrose 40% Gel 15 Gram(s) Oral once PRN Blood Glucose LESS THAN 70 milliGRAM(s)/deciLiter  glucagon  Injectable 1 milliGRAM(s) IntraMuscular once PRN Glucose <70 milliGRAM(s)/deciLiter  labetalol Injectable 10 milliGRAM(s) IV Push every 6 hours PRN BP > 180  melatonin 3 milliGRAM(s) Oral at bedtime PRN Insomnia        LABS                                            12.0                  Neurophils% (auto):   x      (08-29 @ 23:57):    10.47)-----------(133          Lymphocytes% (auto):  x                                             37.9                   Eosinphils% (auto):   x        Manual%: Neutrophils x    ; Lymphocytes x    ; Eosinophils x    ; Bands%: x    ; Blasts x                                    147    |  106    |  17                  Calcium: 8.3   / iCa: x      (08-29 @ 23:57)    ----------------------------<  273       Magnesium: 1.9                              3.7     |  25     |  0.85             Phosphorous: 2.3        ( 08-29 @ 23:57 )   PT: 13.3 sec;   INR: 1.12 ratio  aPTT: 33.2 sec      MICROBIOLOGY:   Culture - Sputum . (08.26.20 @ 17:11)    -  Gentamicin: S 4    -  Imipenem: S <=1    -  Levofloxacin: S <=0.5    -  Meropenem: S <=1    -  Piperacillin/Tazobactam: S <=8    -  Tobramycin: S <=2    Gram Stain:   Few polymorphonuclear leukocytes per low power field  No Squamous epithelial cells per low power field  Few Gram Negative Rods per oil power field    -  Amikacin: S <=16    -  Aztreonam: S <=4    -  Cefepime: S <=2    -  Ceftazidime: S 4    -  Ciprofloxacin: S <=0.25    Specimen Source: .Sputum Sputum    Culture Results:   Rare Pseudomonas aeruginosa  Normal Respiratory Carolyne present    Organism Identification: Pseudomonas aeruginosa    Organism: Pseudomonas aeruginosa    Method Type: RADHA    RADIOLOGY:  < from: CT Head No Cont (08.26.20 @ 01:18) >  EXAM:  CT ANGIO BRAIN (W)AW IC                        EXAM:  CT BRAIN                        EXAM:  CT ANGIO NECK (W)AW IC                        PROCEDURE DATE:  08/26/2020    INTERPRETATION:  CLINICAL INFORMATION: Focal right upper extremity tremor  TECHNIQUE:  1.  Noncontrast head CT scan was performed reconstructed into 5 mm thick axial slices.  2.  Contrast enhanced CT angiography of the neck and head was performed.   MIP reformats were generated.  Intravenous contrast:90 cc of Omnipaque-350 mg/ml were administered, and 10 cc were discarded.  COMPARISON STUDY: None.  FINDINGS:  NONCONTRAST HEAD CT SCAN:  There is no CT evidence of acute intracranial hemorrhage, mass effect or midline shift.  Gray matter-white matter differentiation is grossly preserved.  The ventricles, sulci and extra-axial spaces appear within normal limits.  The paranasal sinuses and tympanomastoid cavities are grossly clear.  There is a 1.3 cm lobulated bony density seen posteriorto the to the left IAC in the left CP angle, nonspecific, may represent meningioma. Further evaluation with MRI can be obtained..    CT ANGIOGRAPHY NECK:  Thoracic aorta and branch vessels: Three vessel arch.  No occlusion, flow limiting stenosis or dissection.    Right carotid system: No occlusion, hemodynamically significant carotid stenosis using NASCET criteria or dissection.    Left carotid system: No occlusion, hemodynamically significant carotid stenosis using NASCET criteria or dissection.    Vertebral arteries: No occlusion, flow-limiting stenosis or dissection.  Right dominant vertebral artery; hypoplastic left vertebral artery ends in a left PICA distribution..    Soft tissues of the neck: Within normal limits.    Visualized spine:Degenerative disease..    Visualized upper chest:  Within normal limits    CT ANGIOGRAPHY BRAIN:  Internal carotid arteries: No occlusion, flow-limiting stenosis or aneurysm.    Anterior cerebral arteries: No occlusion, flow-limiting stenosis or aneurysm    Middle cerebral arteries: No occlusion, flow-limiting stenosis or aneurysm.    Anterior communicating artery: Patent without aneurysm.    Posterior communicating arteries: Patent bilaterally without aneurysm.    Posterior cerebral arteries: Noocclusion, flow-limiting stenosis or aneurysm.    Vertebrobasilar: No occlusion, flow-limiting stenosis or aneurysm.  The distal vertebral arteries are codominant.  Posterior inferior cerebellar arteries, anterior inferior cerebellar arteries and superior cerebellar arteries are patent bilaterally.    IMPRESSION:  NONCONTRAST HEAD CT SCAN:  1. No CT evidence of acute intracranial hemorrhage, mass effect or acute territorial infarct.  2. There is a 1.3 cm lobulated bony density seen posterior to the to the left IAC in the left CP angle, nonspecific, may represent meningioma. Further evaluation with MRI can be obtained..    CT ANGIOGRAPHY NECK: Patent cervical vasculature.  No hemodynamically significant carotid stenosis or flow-limiting vertebral artery stenosis.  No evidence of dissection.    CT ANGIOGRAPHY BRAIN: No vessel occlusion, flow-limiting stenosis or aneurysm is identified about the [place].      ECHO  ------------------------------------------------------------------------  Dimensions:    Normal Values:  LA:     3.5    2.0 - 4.0 cm  Ao:     3.0    2.0 - 3.8 cm  SEPTUM: 1.4    0.6 - 1.2 cm  PWT:    1.3    0.6 - 1.1 cm  LVIDd:  4.0    3.0 - 5.6 cm  LVIDs:  2.4    1.8 - 4.0 cm  EF (Visual Estimate): 65-70 %  Doppler Peak Velocity (m/sec): AoV=1.6 PV=1.1  ------------------------------------------------------------------------  Observations:  Mitral Valve: Mitral annular calcification. Mild mitral  regurgitation.  Aortic Valve/Aorta: Tricuspid aortic valve with normal cusp  excursion. Peak transaortic valve gradient equals 10 mm Hg.  No aortic valve regurgitation seen.  Aortic Root: 3 cm.  Left Atrium: Normal left atrial size.  Left Ventricle: Normal overall left ventricular systolic  function with an estimated ejection fraction of 65-70%. No  overt segmental wall motion abnormalities. The apical  cavity obliterates in systole. Mildly increased left  ventricular wall thickness.  Right Heart: Right atrium not well visualized. The right  ventricle is not well visualized; overall right ventricular  systolic function appears preserved based on the views  obtained. Tricuspid valve structure appears normal. Minimal  tricuspid regurgitation. No pulmonic stenosis. Minimal  pulmonic regurgitation.  Pericardium/Pleura: No pericardial effusion seen.  ------------------------------------------------------------------------  Conclusions:  Suboptimal image quality.  1. Mildly increased left ventricular wall thickness.  2. Normal overall left ventricular systolic function with  an estimated ejection fraction of 65-70%. No overt  segmental wall motion abnormalities. The apical cavity  obliterates in systole.  3. The right ventricle is not well visualized; overall  right ventricular systolic function appears preserved based  on the views obtained.      Plan:  69 yo female with PMHx of HTN, BERKLEY, GERD, Giant cell Arthritis, Gout presents to [facility] 8/25 in a state of HTN Urgency, Hyperglycemic Hyperosmolar Non Ketotic Acidosis, w Lactic Acidosis s/p Seizure 2/2 hyperglycemic / hyperosmolar shift requiring intubation. HHNK resolved, awaiting MRI brain to r/o meningioma.    #Neuro: Generalized seizure/Encephalopathy 2/2 hyperglycemic / hyperosmolar shift now new loculated density in the L IAC concerning for meningioma awaiting MRI brain  - no h/o seizure reported- no further evidence of seizure activity on EEG's  - c/w Keppra 1gm/q12H- if no further seizures and no evidence of meningeoma- f/u Neuro to possibly decrease Keppra dosing   -f/u Neuro  -c/w ASA and Statin  -c/w neuro checks as per protocol    #Pulm: Acute hypoxemic Respiratory Failure s/p intubation for airway protection 2/2 AMS / encephalopathy /generalized seizure activity s/p extubation 8/27- doing well on RA  -c/w RA- O2 sats %    -aspiration precautions  -Incentive spirometry  -Chest PT      #CV: A/W Hypertensive urgency () s/p total Labetalol 40 IV on admission s/p cardene gtt off since yesterday bridged over to nicardipine PO  -c/w home dose Lisinopril - home dose  -c/w Labetalol PRN- 10mg IVP w paratmeters  -c/w Nicardipine      #GI/: No active issues  - pt has been NPO- keofeed in place- tolerated TF- passed BS swallow- start PO soft diet / diabetic  -c/w bowel regimen  - c/w PPI daily    #ID: Afebrile, no leukocytosis at presentation had been initially empirically treated with ceftriaxone for suspicion of meningitis then at a lower dose of aspiration pna now d/c'd    -+pseudomonas A. in the Sputum - c/w Cefepime for 5 days today is day #2/5     #FEN/ENDO: Hyperglycemic Hyperosmolar State vs DKA / HHNK s/p insulin gtt transitioned to HISS and lantus- now resolved  -c/w HISS - Low dose pre-meal and HS  -c/w Lantus q HS  -start pre-meal Humalog coverage  -f/u Endocrine    #HEME:  No active issues  - DVT ppx with Lovenox 40q12     PT Consult      Follow UP:  - MRI Brain  -f/u Neurology- may wan to decrease Keppra dosing  -f/u Endocrine- pt will need ongoing care inpatient and outpatient  -D/C Cefepime s/p 5 days of tx for Pseudomonas in the sputum- started on 8/29  -PT consult

## 2020-08-30 NOTE — PHYSICAL THERAPY INITIAL EVALUATION ADULT - PERTINENT HX OF CURRENT PROBLEM, REHAB EVAL
Pt is a 71 yo female with PMHx of HTN, BERKLEY, GERD, Giant cell Arthritis, Gout presents to [facility] 8/25 in a state of HTN Urgency, Hyperglycemic Hyperosmolar Non Ketotic Acidosis, w Lactic Acidosis s/p Seizure 2/2 hyperglycemic / hyperosmolar shift requiring intubation. HHNK resolved, awaiting MRI brain to r/o meningioma. (-) CXR 8/27/2020. (-) Head/Neck CTA 8/26/2020. Continued below.

## 2020-08-31 DIAGNOSIS — E78.2 MIXED HYPERLIPIDEMIA: ICD-10-CM

## 2020-08-31 LAB
ANION GAP SERPL CALC-SCNC: 11 MMOL/L — SIGNIFICANT CHANGE UP (ref 5–17)
APTT BLD: 33.2 SEC — SIGNIFICANT CHANGE UP (ref 27.5–35.5)
BUN SERPL-MCNC: 19 MG/DL — SIGNIFICANT CHANGE UP (ref 7–23)
CALCIUM SERPL-MCNC: 8.3 MG/DL — LOW (ref 8.4–10.5)
CHLORIDE SERPL-SCNC: 104 MMOL/L — SIGNIFICANT CHANGE UP (ref 96–108)
CO2 SERPL-SCNC: 25 MMOL/L — SIGNIFICANT CHANGE UP (ref 22–31)
CREAT SERPL-MCNC: 1.05 MG/DL — SIGNIFICANT CHANGE UP (ref 0.5–1.3)
CULTURE RESULTS: SIGNIFICANT CHANGE UP
CULTURE RESULTS: SIGNIFICANT CHANGE UP
GLUCOSE BLDC GLUCOMTR-MCNC: 170 MG/DL — HIGH (ref 70–99)
GLUCOSE BLDC GLUCOMTR-MCNC: 192 MG/DL — HIGH (ref 70–99)
GLUCOSE BLDC GLUCOMTR-MCNC: 238 MG/DL — HIGH (ref 70–99)
GLUCOSE BLDC GLUCOMTR-MCNC: 257 MG/DL — HIGH (ref 70–99)
GLUCOSE SERPL-MCNC: 207 MG/DL — HIGH (ref 70–99)
HCT VFR BLD CALC: 34.5 % — SIGNIFICANT CHANGE UP (ref 34.5–45)
HGB BLD-MCNC: 11 G/DL — LOW (ref 11.5–15.5)
INR BLD: 1.07 RATIO — SIGNIFICANT CHANGE UP (ref 0.88–1.16)
MAGNESIUM SERPL-MCNC: 2 MG/DL — SIGNIFICANT CHANGE UP (ref 1.6–2.6)
MCHC RBC-ENTMCNC: 29.7 PG — SIGNIFICANT CHANGE UP (ref 27–34)
MCHC RBC-ENTMCNC: 31.9 GM/DL — LOW (ref 32–36)
MCV RBC AUTO: 93.2 FL — SIGNIFICANT CHANGE UP (ref 80–100)
NRBC # BLD: 0 /100 WBCS — SIGNIFICANT CHANGE UP (ref 0–0)
PHOSPHATE SERPL-MCNC: 2.1 MG/DL — LOW (ref 2.5–4.5)
PLATELET # BLD AUTO: 146 K/UL — LOW (ref 150–400)
POTASSIUM SERPL-MCNC: 3.6 MMOL/L — SIGNIFICANT CHANGE UP (ref 3.5–5.3)
POTASSIUM SERPL-SCNC: 3.6 MMOL/L — SIGNIFICANT CHANGE UP (ref 3.5–5.3)
PROTHROM AB SERPL-ACNC: 12.7 SEC — SIGNIFICANT CHANGE UP (ref 10.6–13.6)
RBC # BLD: 3.7 M/UL — LOW (ref 3.8–5.2)
RBC # FLD: 14.1 % — SIGNIFICANT CHANGE UP (ref 10.3–14.5)
SODIUM SERPL-SCNC: 140 MMOL/L — SIGNIFICANT CHANGE UP (ref 135–145)
SPECIMEN SOURCE: SIGNIFICANT CHANGE UP
SPECIMEN SOURCE: SIGNIFICANT CHANGE UP
WBC # BLD: 9.76 K/UL — SIGNIFICANT CHANGE UP (ref 3.8–10.5)
WBC # FLD AUTO: 9.76 K/UL — SIGNIFICANT CHANGE UP (ref 3.8–10.5)

## 2020-08-31 PROCEDURE — 99232 SBSQ HOSP IP/OBS MODERATE 35: CPT

## 2020-08-31 PROCEDURE — 99291 CRITICAL CARE FIRST HOUR: CPT

## 2020-08-31 PROCEDURE — 99221 1ST HOSP IP/OBS SF/LOW 40: CPT | Mod: GC

## 2020-08-31 RX ORDER — ALLOPURINOL 300 MG
100 TABLET ORAL EVERY 12 HOURS
Refills: 0 | Status: DISCONTINUED | OUTPATIENT
Start: 2020-08-31 | End: 2020-09-02

## 2020-08-31 RX ORDER — ACETAMINOPHEN 500 MG
650 TABLET ORAL ONCE
Refills: 0 | Status: COMPLETED | OUTPATIENT
Start: 2020-08-31 | End: 2020-08-31

## 2020-08-31 RX ORDER — INSULIN GLARGINE 100 [IU]/ML
45 INJECTION, SOLUTION SUBCUTANEOUS AT BEDTIME
Refills: 0 | Status: DISCONTINUED | OUTPATIENT
Start: 2020-08-31 | End: 2020-09-02

## 2020-08-31 RX ORDER — ASPIRIN/CALCIUM CARB/MAGNESIUM 324 MG
81 TABLET ORAL DAILY
Refills: 0 | Status: DISCONTINUED | OUTPATIENT
Start: 2020-08-31 | End: 2020-09-02

## 2020-08-31 RX ORDER — POTASSIUM PHOSPHATE, MONOBASIC POTASSIUM PHOSPHATE, DIBASIC 236; 224 MG/ML; MG/ML
30 INJECTION, SOLUTION INTRAVENOUS ONCE
Refills: 0 | Status: COMPLETED | OUTPATIENT
Start: 2020-08-31 | End: 2020-08-31

## 2020-08-31 RX ORDER — PANTOPRAZOLE SODIUM 20 MG/1
40 TABLET, DELAYED RELEASE ORAL DAILY
Refills: 0 | Status: DISCONTINUED | OUTPATIENT
Start: 2020-08-31 | End: 2020-09-02

## 2020-08-31 RX ORDER — POLYETHYLENE GLYCOL 3350 17 G/17G
17 POWDER, FOR SOLUTION ORAL DAILY
Refills: 0 | Status: DISCONTINUED | OUTPATIENT
Start: 2020-08-31 | End: 2020-09-02

## 2020-08-31 RX ORDER — LIDOCAINE 4 G/100G
1 CREAM TOPICAL DAILY
Refills: 0 | Status: DISCONTINUED | OUTPATIENT
Start: 2020-08-31 | End: 2020-09-02

## 2020-08-31 RX ORDER — LANOLIN ALCOHOL/MO/W.PET/CERES
3 CREAM (GRAM) TOPICAL AT BEDTIME
Refills: 0 | Status: DISCONTINUED | OUTPATIENT
Start: 2020-08-31 | End: 2020-09-02

## 2020-08-31 RX ORDER — ATORVASTATIN CALCIUM 80 MG/1
80 TABLET, FILM COATED ORAL AT BEDTIME
Refills: 0 | Status: DISCONTINUED | OUTPATIENT
Start: 2020-08-31 | End: 2020-09-02

## 2020-08-31 RX ORDER — LISINOPRIL 2.5 MG/1
40 TABLET ORAL DAILY
Refills: 0 | Status: DISCONTINUED | OUTPATIENT
Start: 2020-08-31 | End: 2020-09-02

## 2020-08-31 RX ORDER — INSULIN LISPRO 100/ML
16 VIAL (ML) SUBCUTANEOUS
Refills: 0 | Status: DISCONTINUED | OUTPATIENT
Start: 2020-08-31 | End: 2020-09-02

## 2020-08-31 RX ADMIN — Medication 650 MILLIGRAM(S): at 06:41

## 2020-08-31 RX ADMIN — LEVETIRACETAM 400 MILLIGRAM(S): 250 TABLET, FILM COATED ORAL at 17:19

## 2020-08-31 RX ADMIN — Medication 100 MILLIGRAM(S): at 17:11

## 2020-08-31 RX ADMIN — ENOXAPARIN SODIUM 40 MILLIGRAM(S): 100 INJECTION SUBCUTANEOUS at 17:57

## 2020-08-31 RX ADMIN — CHLORHEXIDINE GLUCONATE 1 APPLICATION(S): 213 SOLUTION TOPICAL at 05:18

## 2020-08-31 RX ADMIN — LISINOPRIL 40 MILLIGRAM(S): 2.5 TABLET ORAL at 05:17

## 2020-08-31 RX ADMIN — NICARDIPINE HYDROCHLORIDE 40 MILLIGRAM(S): 30 CAPSULE, EXTENDED RELEASE ORAL at 13:01

## 2020-08-31 RX ADMIN — Medication 650 MILLIGRAM(S): at 06:11

## 2020-08-31 RX ADMIN — ENOXAPARIN SODIUM 40 MILLIGRAM(S): 100 INJECTION SUBCUTANEOUS at 05:17

## 2020-08-31 RX ADMIN — BRIMONIDINE TARTRATE 1 DROP(S): 2 SOLUTION/ DROPS OPHTHALMIC at 17:58

## 2020-08-31 RX ADMIN — CEFEPIME 100 MILLIGRAM(S): 1 INJECTION, POWDER, FOR SOLUTION INTRAMUSCULAR; INTRAVENOUS at 13:02

## 2020-08-31 RX ADMIN — Medication 14 UNIT(S): at 17:11

## 2020-08-31 RX ADMIN — CEFEPIME 100 MILLIGRAM(S): 1 INJECTION, POWDER, FOR SOLUTION INTRAMUSCULAR; INTRAVENOUS at 05:18

## 2020-08-31 RX ADMIN — INSULIN GLARGINE 45 UNIT(S): 100 INJECTION, SOLUTION SUBCUTANEOUS at 22:44

## 2020-08-31 RX ADMIN — Medication 2: at 17:11

## 2020-08-31 RX ADMIN — NICARDIPINE HYDROCHLORIDE 40 MILLIGRAM(S): 30 CAPSULE, EXTENDED RELEASE ORAL at 05:17

## 2020-08-31 RX ADMIN — Medication 14 UNIT(S): at 08:37

## 2020-08-31 RX ADMIN — ATORVASTATIN CALCIUM 80 MILLIGRAM(S): 80 TABLET, FILM COATED ORAL at 22:43

## 2020-08-31 RX ADMIN — Medication 6: at 08:37

## 2020-08-31 RX ADMIN — Medication 81 MILLIGRAM(S): at 13:01

## 2020-08-31 RX ADMIN — Medication 4: at 12:10

## 2020-08-31 RX ADMIN — LATANOPROST 1 DROP(S): 0.05 SOLUTION/ DROPS OPHTHALMIC; TOPICAL at 22:43

## 2020-08-31 RX ADMIN — LIDOCAINE 1 PATCH: 4 CREAM TOPICAL at 20:00

## 2020-08-31 RX ADMIN — Medication 2: at 22:44

## 2020-08-31 RX ADMIN — LIDOCAINE 1 PATCH: 4 CREAM TOPICAL at 12:26

## 2020-08-31 RX ADMIN — CEFEPIME 100 MILLIGRAM(S): 1 INJECTION, POWDER, FOR SOLUTION INTRAMUSCULAR; INTRAVENOUS at 22:44

## 2020-08-31 RX ADMIN — PANTOPRAZOLE SODIUM 40 MILLIGRAM(S): 20 TABLET, DELAYED RELEASE ORAL at 18:29

## 2020-08-31 RX ADMIN — Medication 14 UNIT(S): at 12:10

## 2020-08-31 RX ADMIN — BRIMONIDINE TARTRATE 1 DROP(S): 2 SOLUTION/ DROPS OPHTHALMIC at 05:19

## 2020-08-31 RX ADMIN — POTASSIUM PHOSPHATE, MONOBASIC POTASSIUM PHOSPHATE, DIBASIC 83.33 MILLIMOLE(S): 236; 224 INJECTION, SOLUTION INTRAVENOUS at 01:12

## 2020-08-31 RX ADMIN — LEVETIRACETAM 400 MILLIGRAM(S): 250 TABLET, FILM COATED ORAL at 05:18

## 2020-08-31 NOTE — PROGRESS NOTE ADULT - ATTENDING COMMENTS
Pt seen and examined. 70 F with HTN, admitted to MICU with hypertensive urgency and DKA with course complicated by a seizure, acute respiratory failure with hypoxia requiring intubation and pseudomonas pneumonia.  -- doing well, awake, alert and oriented. C/w Keppra per Jon. F/u MRI for meningioma  -- c/w Lisinopril and Nicardipine for HTN  -- C/w Lantus and Insulin s/s per endo recommendations.  Stable for transfer to floor.

## 2020-08-31 NOTE — PROGRESS NOTE ADULT - PROBLEM SELECTOR PROBLEM 1
Type 2 diabetes mellitus with hyperglycemia, without long-term current use of insulin Diabetic ketoacidosis without coma associated with type 2 diabetes mellitus

## 2020-08-31 NOTE — PROGRESS NOTE ADULT - ASSESSMENT
69 yo female with PMHx of HTN, BERKLEY, GERD, Giant cell Arthritis, Gout presents to [facility] 8/25 in a state of HTN Urgency, Hyperglycemic Hyperosmolar Non Ketotic Acidosis, w Lactic Acidosis s/p Seizure 2/2 hyperglycemic / hyperosmolar shift requiring intubation. HHNK resolved, awaiting MRI brain to r/o meningioma.    Plan:    #Neuro: Generalized seizure/Encephalopathy 2/2 hyperglycemic / hyperosmolar shift now new loculated density in the L IAC concerning for meningioma awaiting MRI brain  - no h/o seizure reported- no further evidence of seizure activity on EEG's  - c/w Keppra 1gm/q12H- if no further seizures and no evidence of meningeoma- f/u Neuro to possibly decrease Keppra dosing   -f/u Neuro  -c/w ASA and Statin  -c/w neuro checks as per protocol    #Pulm: Acute hypoxemic Respiratory Failure s/p intubation for airway protection 2/2 AMS / encephalopathy /generalized seizure activity s/p extubation 8/27- doing well on RA  -c/w RA- O2 sats %    -aspiration precautions  -Incentive spirometry  -Chest PT      #CV: A/W Hypertensive urgency () s/p total Labetalol 40 IV on admission s/p cardene gtt off since yesterday bridged over to nicardipine PO  -c/w home dose Lisinopril - home dose  -c/w Labetalol PRN- 10mg IVP w paratmeters  -c/w Nicardipine      #GI/: No active issues  - pt has been NPO- keofeed in place- tolerated TF- passed BS swallow- start PO soft diet / diabetic  -c/w bowel regimen  - c/w PPI daily    #ID: Afebrile, no leukocytosis at presentation had been initially empirically treated with ceftriaxone for suspicion of meningitis then at a lower dose of aspiration pna now d/c'd    -+pseudomonas A. in the Sputum - c/w Cefepime for 5 days today is day #2/5     #FEN/ENDO: Hyperglycemic Hyperosmolar State vs DKA / HHNK s/p insulin gtt transitioned to HISS and lantus- now resolved  -c/w HISS   -start pre-meal Humolog  -c/w Lantus q HS  -start pre-meal Humalog coverage  -f/u Endocrine    #HEME:  No active issues  - DVT ppx with Lovenox 40q12     PT Consult 71 yo female with PMHx of HTN, BERKLEY, GERD, Giant cell Arthritis, Gout presents to [facility] 8/25 in a state of HTN Urgency, Hyperglycemic Hyperosmolar Non Ketotic Acidosis, w Lactic Acidosis s/p Seizure 2/2 hyperglycemic / hyperosmolar shift requiring intubation. HHNK resolved, awaiting MRI brain to r/o meningioma. New diagnosis of DMT2 being worked up with endocrinology.  Bedboarded.    Plan:    #Neuro: Generalized seizure/Encephalopathy 2/2 hyperglycemic / hyperosmolar shift now new loculated density in the LIAC concerning for meningioma awaiting MRI brain  - no h/o seizure reported- no further evidence of seizure activity on EEG's  - c/w Keppra 1gm/q12H- if no further seizures and no evidence of meningeoma  - f/u Neuro to possibly decrease Keppra dosing   -c/w ASA and Statin  -c/w neuro checks as per protocol  - f/u MRI head    #Pulm: Acute hypoxemic Respiratory Failure s/p intubation for airway protection 2/2 AMS / encephalopathy /generalized seizure activity s/p extubation 8/27- doing well on RA  -c/w RA- O2 sats % over last 24hrs  - NC prn if O2 sats <90%  -aspiration precautions  -Incentive spirometry  -Chest PT    #CV: A/W Hypertensive urgency () s/p total Labetalol 40 IV on admission s/p cardene gtt [08/29] bridged to nicardipine PO  - c/w Lisinopril 40 qd, Nicardipine 40 tid  - c/w Labetalol PRN- 10mg IVP w paratmeters  - 173-124/84-57 over last 24 hrs    #GI/: No active issues  - s/p NPO- keofeed in place- tolerated TF- passed BS swallow- now on PO soft diet / diabetic  - c/w bowel regimen  - c/w PPI daily    #ID: Afebrile, no leukocytosis at presentation had been initially empirically treated with ceftriaxone for suspicion of meningitis then at a lower dose of aspiration pna now d/c'd    - sputum + pseudomonas A.   - c/w Cefepime for 5 days today is day #3/5     #FEN/ENDO: Hyperglycemic Hyperosmolar State vs DKA / HHNK s/p insulin gtt transitioned to HISS and lantus- now resolved  - new dx of DM on this admission, endocrine recs appreciated  - deferring abdominal imaging at this time  - HbA1c 9.0  - Lantus 38U qhs, low ss correction at bedtime  - increased pre-meal Humalog to 12U tid, seperate low ss correction for pre-meals  - 08/30: 5am 4U ss, noon 8U + 8U ss, 14U +     -start pre-meal Humolog  -c/w Lantus q HS  -start pre-meal Humalog coverage  -f/u Endocrine    #HEME:  No active issues  - DVT ppx with Lovenox 40q12     #MSK  chronic hip pain   - lidocaine patch qd  - PT Consult #Neuro: Generalized seizure/Encephalopathy 2/2 hyperglycemic / hyperosmolar shift now new loculated density in the LIAC concerning for meningioma awaiting MRI brain  - no h/o seizure reported- no further evidence of seizure activity on EEG's  - c/w Keppra 1gm/q12H- if no further seizures and no evidence of meningeoma  - f/u Neuro to possibly decrease Keppra dosing   -c/w ASA and Statin  -c/w neuro checks as per protocol  - f/u MRI head    #Pulm: Acute hypoxemic Respiratory Failure s/p intubation for airway protection 2/2 AMS / encephalopathy /generalized seizure activity s/p extubation 8/27- doing well on RA  -c/w RA- O2 sats % over last 24hrs  - NC prn if O2 sats <90%  -aspiration precautions  -Incentive spirometry  -Chest PT    #CV: A/W Hypertensive urgency () s/p total Labetalol 40 IV on admission s/p cardene gtt [08/29] bridged to nicardipine PO  - c/w Lisinopril 40 qd, Nicardipine 40 tid  - c/w Labetalol PRN- 10mg IVP w paratmeters  - 173-124/84-57 over last 24 hrs    #GI/: No active issues  - s/p NPO- keofeed in place- tolerated TF- passed BS swallow- now on PO soft diet / diabetic  - c/w bowel regimen  - c/w PPI daily    #ID: Afebrile, no leukocytosis at presentation had been initially empirically treated with ceftriaxone for suspicion of meningitis then at a lower dose of aspiration pna now d/c'd    - sputum + pseudomonas A.   - c/w Cefepime: day #3/5     #FEN/ENDO: Hyperglycemic Hyperosmolar State vs DKA / HHNK s/p insulin gtt transitioned to HISS and lantus- now resolved  - new dx of DM on this admission  - HbA1c 9.0  - increased basal -> Lantus 38U qhs [08/30-], low ss correction at bedtime  - increased pre-meal -> Humalog 14U tid [08/30-], seperate low ss correction for pre-meals  - 08/29 11:30pm lantus 34 ---> 08/30: 5am [fs 227] 4U ss, 12pm [fs 335] 8U pre-meal + 8U ss, 5pm [fs 245] 14U premeal + 4U ss, 9pm [fs 189] 38U lantus + 2U ss  - endocrine on board  - monitor POCT glu    #HEME:  No active issues  - DVT ppx with Lovenox 40q12     #MSK  chronic hip pain   - lidocaine patch qd  - PT Consult

## 2020-08-31 NOTE — PROGRESS NOTE ADULT - ATTENDING COMMENTS
MR imaging dated 8/29/2020 reviewed and found to demonstrate marked cerebral atrophy, out of proportion to age. But there are neither T2 FLAIR changes now DWI hyperintensities. Moreover, no GRE findings to suggest hemorrhage nor contrast enhancing ones to suggest neoplasm.

## 2020-08-31 NOTE — PROGRESS NOTE ADULT - SUBJECTIVE AND OBJECTIVE BOX
MICU PROGRESS NOTE  Lazara Andrew, PGY1 Green Team Intern     INTERVAL HPI/OVERNIGHT EVENTS:  No Overnight Events.     SUBJECTIVE:   Patient seen and examined at bedside.     REVIEW OF SYMPTOMS:  unable to obtain due to patient mentation     OBJECTIVE:    VITAL SIGNS:  ICU Vital Signs Last 24 Hrs  T(C): 36.9 (31 Aug 2020 04:00), Max: 36.9 (31 Aug 2020 00:00)  T(F): 98.4 (31 Aug 2020 04:00), Max: 98.5 (31 Aug 2020 00:00)  HR: 84 (31 Aug 2020 07:00) (69 - 103)  BP: 145/65 (31 Aug 2020 07:00) (124/57 - 173/84)  BP(mean): 93 (31 Aug 2020 07:00) (82 - 120)  ABP: --  ABP(mean): --  RR: 19 (31 Aug 2020 07:00) (15 - 38)  SpO2: 99% (31 Aug 2020 07:00) (92% - 100%)        08-30 @ 07:01  -  08-31 @ 07:00  --------------------------------------------------------  IN: 1799.8 mL / OUT: 350 mL / NET: 1449.8 mL      CAPILLARY BLOOD GLUCOSE      POCT Blood Glucose.: 189 mg/dL (30 Aug 2020 21:13)      PHYSICAL EXAM:  General: NAD  HEENT: NC/AT; PERRL, clear conjunctiva  Neck: supple  Respiratory: CTA b/l  Cardiovascular: +S1/S2; RRR  Abdomen: soft, NT/ND; +BS x4  Extremities: WWP, 2+ peripheral pulses b/l; no LE edema  Skin: normal color and turgor; no rash  Neurological:    MEDICATIONS:  MEDICATIONS  (STANDING):  allopurinol 100 milliGRAM(s) Oral every 12 hours  aspirin  chewable 81 milliGRAM(s) Oral daily  atorvastatin 80 milliGRAM(s) Oral at bedtime  brimonidine 0.2% Ophthalmic Solution 1 Drop(s) Both EYES two times a day  cefepime   IVPB 2000 milliGRAM(s) IV Intermittent every 8 hours  chlorhexidine 4% Liquid 1 Application(s) Topical <User Schedule>  dextrose 5%. 1000 milliLiter(s) (50 mL/Hr) IV Continuous <Continuous>  dextrose 50% Injectable 12.5 Gram(s) IV Push once  dextrose 50% Injectable 25 Gram(s) IV Push once  dextrose 50% Injectable 25 Gram(s) IV Push once  enoxaparin Injectable 40 milliGRAM(s) SubCutaneous every 12 hours  insulin glargine Injectable (LANTUS) 38 Unit(s) SubCutaneous at bedtime  insulin lispro (HumaLOG) corrective regimen sliding scale   SubCutaneous Before meals and at bedtime  insulin lispro Injectable (HumaLOG) 14 Unit(s) SubCutaneous three times a day with meals  latanoprost 0.005% Ophthalmic Solution 1 Drop(s) Both EYES at bedtime  levETIRAcetam  IVPB 1000 milliGRAM(s) IV Intermittent every 12 hours  lisinopril 40 milliGRAM(s) Oral daily  niCARdipine 40 milliGRAM(s) Oral three times a day  polyethylene glycol 3350 17 Gram(s) Oral daily    MEDICATIONS  (PRN):  dextrose 40% Gel 15 Gram(s) Oral once PRN Blood Glucose LESS THAN 70 milliGRAM(s)/deciLiter  glucagon  Injectable 1 milliGRAM(s) IntraMuscular once PRN Glucose <70 milliGRAM(s)/deciLiter  labetalol Injectable 10 milliGRAM(s) IV Push every 6 hours PRN BP > 180  melatonin 3 milliGRAM(s) Oral at bedtime PRN Insomnia      ALLERGIES:  Allergies    No Known Allergies    Intolerances        LABS:                        11.0   9.76  )-----------( 146      ( 31 Aug 2020 00:24 )             34.5     08-31    140  |  104  |  19  ----------------------------<  207<H>  3.6   |  25  |  1.05    Ca    8.3<L>      31 Aug 2020 00:25  Phos  2.1     08-31  Mg     2.0     08-31      PT/INR - ( 31 Aug 2020 00:24 )   PT: 12.7 sec;   INR: 1.07 ratio         PTT - ( 31 Aug 2020 00:24 )  PTT:33.2 sec      RADIOLOGY & ADDITIONAL TESTS: Reviewed. MICU PROGRESS NOTE  Lazara Andrew, PGY1 Green Team Intern     INTERVAL HPI/OVERNIGHT EVENTS:  No Overnight Events.     SUBJECTIVE:   Patient seen and examined at bedside. Endorses hip pain, chronic. Concerned about underlying cause of seizure being a meningioma. No other complaints. Denies fevers, chills, night sweats, abdominal pain, N/V, changes in st     REVIEW OF SYMPTOMS:  as above    OBJECTIVE:    VITAL SIGNS:  ICU Vital Signs Last 24 Hrs  T(C): 36.9 (31 Aug 2020 04:00), Max: 36.9 (31 Aug 2020 00:00)  T(F): 98.4 (31 Aug 2020 04:00), Max: 98.5 (31 Aug 2020 00:00)  HR: 84 (31 Aug 2020 07:00) (69 - 103)  BP: 145/65 (31 Aug 2020 07:00) (124/57 - 173/84)  BP(mean): 93 (31 Aug 2020 07:00) (82 - 120)  ABP: --  ABP(mean): --  RR: 19 (31 Aug 2020 07:00) (15 - 38)  SpO2: 99% (31 Aug 2020 07:00) (92% - 100%)        08-30 @ 07:01  -  08-31 @ 07:00  --------------------------------------------------------  IN: 1799.8 mL / OUT: 350 mL / NET: 1449.8 mL      CAPILLARY BLOOD GLUCOSE      POCT Blood Glucose.: 189 mg/dL (30 Aug 2020 21:13)      PHYSICAL EXAM:  General: NAD  HEENT: NC/AT; PERRL, clear conjunctiva  Neck: supple  Respiratory: CTA b/l  Cardiovascular: +S1/S2; RRR  Abdomen: soft, NT/ND; +BS x4  Extremities: WWP, 2+ peripheral pulses b/l; no LE edema  Skin: normal color and turgor; no rash  Neurological:    MEDICATIONS:  MEDICATIONS  (STANDING):  allopurinol 100 milliGRAM(s) Oral every 12 hours  aspirin  chewable 81 milliGRAM(s) Oral daily  atorvastatin 80 milliGRAM(s) Oral at bedtime  brimonidine 0.2% Ophthalmic Solution 1 Drop(s) Both EYES two times a day  cefepime   IVPB 2000 milliGRAM(s) IV Intermittent every 8 hours  chlorhexidine 4% Liquid 1 Application(s) Topical <User Schedule>  dextrose 5%. 1000 milliLiter(s) (50 mL/Hr) IV Continuous <Continuous>  dextrose 50% Injectable 12.5 Gram(s) IV Push once  dextrose 50% Injectable 25 Gram(s) IV Push once  dextrose 50% Injectable 25 Gram(s) IV Push once  enoxaparin Injectable 40 milliGRAM(s) SubCutaneous every 12 hours  insulin glargine Injectable (LANTUS) 38 Unit(s) SubCutaneous at bedtime  insulin lispro (HumaLOG) corrective regimen sliding scale   SubCutaneous Before meals and at bedtime  insulin lispro Injectable (HumaLOG) 14 Unit(s) SubCutaneous three times a day with meals  latanoprost 0.005% Ophthalmic Solution 1 Drop(s) Both EYES at bedtime  levETIRAcetam  IVPB 1000 milliGRAM(s) IV Intermittent every 12 hours  lisinopril 40 milliGRAM(s) Oral daily  niCARdipine 40 milliGRAM(s) Oral three times a day  polyethylene glycol 3350 17 Gram(s) Oral daily    MEDICATIONS  (PRN):  dextrose 40% Gel 15 Gram(s) Oral once PRN Blood Glucose LESS THAN 70 milliGRAM(s)/deciLiter  glucagon  Injectable 1 milliGRAM(s) IntraMuscular once PRN Glucose <70 milliGRAM(s)/deciLiter  labetalol Injectable 10 milliGRAM(s) IV Push every 6 hours PRN BP > 180  melatonin 3 milliGRAM(s) Oral at bedtime PRN Insomnia      ALLERGIES:  Allergies    No Known Allergies    Intolerances        LABS:                        11.0   9.76  )-----------( 146      ( 31 Aug 2020 00:24 )             34.5     08-31    140  |  104  |  19  ----------------------------<  207<H>  3.6   |  25  |  1.05    Ca    8.3<L>      31 Aug 2020 00:25  Phos  2.1     08-31  Mg     2.0     08-31      PT/INR - ( 31 Aug 2020 00:24 )   PT: 12.7 sec;   INR: 1.07 ratio         PTT - ( 31 Aug 2020 00:24 )  PTT:33.2 sec      RADIOLOGY & ADDITIONAL TESTS: Reviewed. MICU PROGRESS NOTE  Lazara Andrew, PGY1 Green Team Intern     INTERVAL HPI/OVERNIGHT EVENTS:  No Overnight Events.   Bedboarded.    SUBJECTIVE:   Patient seen and examined at bedside. Endorses hip pain, chronic. Concerned about underlying cause of seizure being a meningioma. No other complaints. Denies fevers, chills, night sweats, abdominal pain, N/V, changes in bowel/bladder habits.    REVIEW OF SYMPTOMS:  as above    OBJECTIVE:    VITAL SIGNS:  ICU Vital Signs Last 24 Hrs  T(C): 36.9 (31 Aug 2020 04:00), Max: 36.9 (31 Aug 2020 00:00)  T(F): 98.4 (31 Aug 2020 04:00), Max: 98.5 (31 Aug 2020 00:00)  HR: 84 (31 Aug 2020 07:00) (69 - 103)  BP: 145/65 (31 Aug 2020 07:00) (124/57 - 173/84)  BP(mean): 93 (31 Aug 2020 07:00) (82 - 120)  ABP: --  ABP(mean): --  RR: 19 (31 Aug 2020 07:00) (15 - 38)  SpO2: 99% (31 Aug 2020 07:00) (92% - 100%)        08-30 @ 07:01  -  08-31 @ 07:00  --------------------------------------------------------  IN: 1799.8 mL / OUT: 350 mL / NET: 1449.8 mL      CAPILLARY BLOOD GLUCOSE      POCT Blood Glucose.: 189 mg/dL (30 Aug 2020 21:13)      PHYSICAL EXAM:  General: sitting in chair, NAD  HEENT: sclera clear, trachea midline  Lymph Nodes: non palp  Neck: supple  Respiratory: on RA, bilaterally equal BS   Cardiovascular: S1S2 RRR,   Abdomen: obese, soft, non distended, +bs present  Extremities: edema - non pitting x 4  Skin: warm, dry  Neurological: +PERRL, speech is clear, following all commands appropriately, bilaterally equal strength and mobility x 4  Psychiatry: some anxiety when discussing head MRI    MEDICATIONS:  MEDICATIONS  (STANDING):  allopurinol 100 milliGRAM(s) Oral every 12 hours  aspirin  chewable 81 milliGRAM(s) Oral daily  atorvastatin 80 milliGRAM(s) Oral at bedtime  brimonidine 0.2% Ophthalmic Solution 1 Drop(s) Both EYES two times a day  cefepime   IVPB 2000 milliGRAM(s) IV Intermittent every 8 hours  chlorhexidine 4% Liquid 1 Application(s) Topical <User Schedule>  dextrose 5%. 1000 milliLiter(s) (50 mL/Hr) IV Continuous <Continuous>  dextrose 50% Injectable 12.5 Gram(s) IV Push once  dextrose 50% Injectable 25 Gram(s) IV Push once  dextrose 50% Injectable 25 Gram(s) IV Push once  enoxaparin Injectable 40 milliGRAM(s) SubCutaneous every 12 hours  insulin glargine Injectable (LANTUS) 38 Unit(s) SubCutaneous at bedtime  insulin lispro (HumaLOG) corrective regimen sliding scale   SubCutaneous Before meals and at bedtime  insulin lispro Injectable (HumaLOG) 14 Unit(s) SubCutaneous three times a day with meals  latanoprost 0.005% Ophthalmic Solution 1 Drop(s) Both EYES at bedtime  levETIRAcetam  IVPB 1000 milliGRAM(s) IV Intermittent every 12 hours  lisinopril 40 milliGRAM(s) Oral daily  niCARdipine 40 milliGRAM(s) Oral three times a day  polyethylene glycol 3350 17 Gram(s) Oral daily    MEDICATIONS  (PRN):  dextrose 40% Gel 15 Gram(s) Oral once PRN Blood Glucose LESS THAN 70 milliGRAM(s)/deciLiter  glucagon  Injectable 1 milliGRAM(s) IntraMuscular once PRN Glucose <70 milliGRAM(s)/deciLiter  labetalol Injectable 10 milliGRAM(s) IV Push every 6 hours PRN BP > 180  melatonin 3 milliGRAM(s) Oral at bedtime PRN Insomnia      ALLERGIES:  Allergies    No Known Allergies    Intolerances        LABS:                        11.0   9.76  )-----------( 146      ( 31 Aug 2020 00:24 )             34.5     08-31    140  |  104  |  19  ----------------------------<  207<H>  3.6   |  25  |  1.05    Ca    8.3<L>      31 Aug 2020 00:25  Phos  2.1     08-31  Mg     2.0     08-31      PT/INR - ( 31 Aug 2020 00:24 )   PT: 12.7 sec;   INR: 1.07 ratio         PTT - ( 31 Aug 2020 00:24 )  PTT:33.2 sec      RADIOLOGY & ADDITIONAL TESTS: Reviewed.

## 2020-08-31 NOTE — PROGRESS NOTE ADULT - ASSESSMENT
69 y/o F with history of HTN, Gout, obesity, presenting w/ b/l ankle swelling, fatigue/sleepiness and generalized weakness x several days. Found to have serum BG of 796, AG 22, bicarb 21, BHB2.3, lactate 9, WBC 10.88. UA negative for UTI. CXR showed clear lungs. CTH showing possible meningioma. Given 2L IVF bolus and  admitted to MICU where she was started on insulin drip and IVF. CCB seizure while in MICU possibly due to rapid correction of BG, intubated for airway protection.  Endocrinology consulted for concern for DKA/HHS in the setting of newly diagnosed DM (high risk patient with DKA with seizure with high level decision-making). DKA resolved. Now extubated, off insulin drip, transitioned to PO diet which she is tolerating.       Problem/Recommendation - 1:  Problem: Type 2 diabetes mellitus with hyperglycemia, without long-term current use of insulin. Recommendation: Newly diagnosed diabetes with HbA1C 9.0 presenting with DKA, suspect ketosis prone type 2 given obesity. However, given acute development of marked hyperglycemia and diabetes, other possible etiologies include DENTON and pancreatic cancer. DKA resolved. Pt now on PO diet. BG remain above goal.   - increase Lantus to 56 units qhs   - increase Humalog to 18 units TID pre-meal  - low pre-meal Humalog correction scale and separate low bed time Humalog correction scale   - f/u CATY antibody, Islet cell antibody, insulin antibody to r/o DENTON  - consider abdominal imaging to r/o pancreatic cancer   Discharge:  - Plan TBD based on further data.     Problem/Recommendation - 2:  ·  Problem: Diabetic ketoacidosis without coma associated with type 2 diabetes mellitus.  Recommendation: management as above.      Problem/Recommendation - 3:  ·  Problem: Hypertension, unspecified type.  Recommendation: - goal BP <130/80  - management per primary team        Lizbeth Wells DO, Endocrine Fellow   Pager [phone]. from 9-5PM. After hours and on weekends please call [phone]. 71 y/o F with history of HTN, Gout, obesity, presenting w/ b/l ankle swelling, fatigue/sleepiness and generalized weakness x several days. Found to have serum BG of 796, AG 22, bicarb 21, BHB2.3, lactate 9, WBC 10.88. UA negative for UTI. CXR showed clear lungs. CTH showing possible meningioma. Given 2L IVF bolus and  admitted to MICU where she was started on insulin drip and IVF. CCB seizure while in MICU possibly due to rapid correction of BG, intubated for airway protection.  Endocrinology consulted for concern for DKA/HHS in the setting of newly diagnosed DM (high risk patient with DKA with seizure with high level decision-making). DKA resolved. Now extubated, off insulin drip, transitioned to PO diet which she is tolerating.       Problem/Recommendation - 1:  Problem: Type 2 diabetes mellitus with hyperglycemia, without long-term current use of insulin. Recommendation: Newly diagnosed diabetes with HbA1C 9.0 presenting with DKA, suspect ketosis prone type 2 given obesity. However, given acute development of marked hyperglycemia and diabetes, other possible etiologies include DENTON and pancreatic cancer. DKA resolved. Pt now on PO diet. BG remain above goal.   - increase Lantus to 45 units qhs   - increase Humalog to 16 units TID pre-meal  - moderate pre-meal Humalog correction scale and separate low bed time Humalog correction scale   - f/u CATY-65 antibody  - please order zinc transporter antibody   - Islet cell antibody negative  - consider abdominal imaging to r/o pancreatic cancer   Discharge:  - Plan TBD based on further data.     Problem/Recommendation - 2:  ·  Problem: Diabetic ketoacidosis without coma associated with type 2 diabetes mellitus.  Recommendation: management as above.      Problem/Recommendation - 3:  ·  Problem: Hypertension, unspecified type.  Recommendation: - goal BP <130/80  - management per primary team        Lizbeth Wells DO, Endocrine Fellow   Pager [phone]. from 9-5PM. After hours and on weekends please call [phone]. 69 y/o F with history of HTN, Gout, obesity, presenting w/ b/l ankle swelling, fatigue/sleepiness and generalized weakness x several days. Found to have serum BG of 796, AG 22, bicarb 21, BHB2.3, lactate 9, WBC 10.88. UA negative for UTI. CXR showed clear lungs. CTH showing possible meningioma. Given 2L IVF bolus and  admitted to MICU where she was started on insulin drip and IVF. CCB seizure while in MICU possibly due to rapid correction of BG, intubated for airway protection.  Endocrinology consulted for concern for DKA/HHS in the setting of newly diagnosed DM (high risk patient with DKA with seizure with high level decision-making). DKA resolved. Now extubated, off insulin drip, transitioned to PO diet which she is tolerating.       Problem/Recommendation - 1:  Problem: Type 2 diabetes mellitus with hyperglycemia, without long-term current use of insulin. Recommendation: Newly diagnosed diabetes with HbA1C 9.0 presenting with DKA, suspect ketosis prone type 2 given obesity. However, given acute development of marked hyperglycemia and diabetes, other possible etiologies include DENTON and pancreatic cancer. DKA resolved. Pt now on PO diet. BG remain above goal.   - increase Lantus to 45 units qhs   - increase Humalog to 16 units TID pre-meal  - moderate pre-meal Humalog correction scale and separate low bed time Humalog correction scale   - f/u CATY-65 antibody  - please order zinc transporter antibody   - Islet cell antibody negative  - consider abdominal imaging to r/o pancreatic cancer   Discharge:  - Plan TBD based on further data.     Problem/Recommendation - 2:  ·  Problem: Diabetic ketoacidosis without coma associated with type 2 diabetes mellitus.  Recommendation: management as above.      Problem/Recommendation - 3:  ·  Problem: Hypertension, unspecified type.  Recommendation: - goal BP <130/80  - management per primary team    D/W Team    Lizbeth Wells DO, Endocrine Fellow   Pager [phone]. from 9-5PM. After hours and on weekends please call [phone]. 69 y/o F with history of HTN, Gout, obesity, presenting w/ b/l ankle swelling, fatigue/sleepiness and generalized weakness x several days. Found to have serum BG of 796, AG 22, bicarb 21, BHB2.3, lactate 9, WBC 10.88. UA negative for UTI. CXR showed clear lungs. CTH showing possible meningioma. Given 2L IVF bolus and  admitted to MICU where she was started on insulin drip and IVF. CCB seizure while in MICU possibly due to rapid correction of BG, intubated for airway protection.  Endocrinology consulted for concern for DKA/HHS in the setting of newly diagnosed DM (high risk patient with DKA with seizure with high level decision-making). DKA resolved. Now extubated, off insulin drip, transitioned to PO diet which she is tolerating.        D/W Team    Lizbeth Wells DO, Endocrine Fellow   Pager [phone]. from 9-5PM. After hours and on weekends please call [phone].

## 2020-08-31 NOTE — PROGRESS NOTE ADULT - SUBJECTIVE AND OBJECTIVE BOX
Chief Complaint: new onset DM presenting with DKA    History: Pt seen and examined. States she is feeling well. Tolerating PO diet but doesn't like the food very much.     MEDICATIONS  (STANDING):  allopurinol 100 milliGRAM(s) Oral every 12 hours  aspirin  chewable 81 milliGRAM(s) Oral daily  atorvastatin 80 milliGRAM(s) Oral at bedtime  brimonidine 0.2% Ophthalmic Solution 1 Drop(s) Both EYES two times a day  cefepime   IVPB 2000 milliGRAM(s) IV Intermittent every 8 hours  chlorhexidine 4% Liquid 1 Application(s) Topical <User Schedule>  dextrose 5%. 1000 milliLiter(s) (50 mL/Hr) IV Continuous <Continuous>  dextrose 50% Injectable 12.5 Gram(s) IV Push once  dextrose 50% Injectable 25 Gram(s) IV Push once  dextrose 50% Injectable 25 Gram(s) IV Push once  enoxaparin Injectable 40 milliGRAM(s) SubCutaneous every 12 hours  insulin glargine Injectable (LANTUS) 38 Unit(s) SubCutaneous at bedtime  insulin lispro (HumaLOG) corrective regimen sliding scale   SubCutaneous Before meals and at bedtime  insulin lispro Injectable (HumaLOG) 14 Unit(s) SubCutaneous three times a day with meals  latanoprost 0.005% Ophthalmic Solution 1 Drop(s) Both EYES at bedtime  levETIRAcetam  IVPB 1000 milliGRAM(s) IV Intermittent every 12 hours  lidocaine   Patch 1 Patch Transdermal daily  lisinopril 40 milliGRAM(s) Oral daily  niCARdipine 40 milliGRAM(s) Oral three times a day  polyethylene glycol 3350 17 Gram(s) Oral daily    MEDICATIONS  (PRN):  dextrose 40% Gel 15 Gram(s) Oral once PRN Blood Glucose LESS THAN 70 milliGRAM(s)/deciLiter  glucagon  Injectable 1 milliGRAM(s) IntraMuscular once PRN Glucose <70 milliGRAM(s)/deciLiter  labetalol Injectable 10 milliGRAM(s) IV Push every 6 hours PRN BP > 180  melatonin 3 milliGRAM(s) Oral at bedtime PRN Insomnia      PHYSICAL EXAM:  Vital Signs Last 24 Hrs  T(C): 36.8 (31 Aug 2020 08:00), Max: 36.9 (31 Aug 2020 00:00)  T(F): 98.2 (31 Aug 2020 08:00), Max: 98.5 (31 Aug 2020 00:00)  HR: 75 (31 Aug 2020 15:00) (69 - 105)  BP: 139/74 (31 Aug 2020 15:00) (124/57 - 160/87)  BP(mean): 99 (31 Aug 2020 15:00) (82 - 114)  RR: 20 (31 Aug 2020 15:00) (15 - 38)  SpO2: 94% (31 Aug 2020 15:00) (92% - 100%)  GENERAL: awake, alert, NAD, obese  EYES: No proptosis, no lid lag, anicteric  THYROID: Normal size, no palpable nodules  RESPIRATORY: CTA b/l, no wheezes or crackles   CARDIOVASCULAR: S1S2 RRR; no murmurs; no peripheral edema  GI: Soft, nontender, non distended, normal bowel sounds  SKIN: Dry, intact, No rashes or lesions  PSYCH: agitated   Neuro: AAOx3, conversive     CAPILLARY BLOOD GLUCOSE      POCT Blood Glucose.: 238 mg/dL (31 Aug 2020 12:08)  POCT Blood Glucose.: 257 mg/dL (31 Aug 2020 08:30)  POCT Blood Glucose.: 189 mg/dL (30 Aug 2020 21:13)  POCT Blood Glucose.: 245 mg/dL (30 Aug 2020 16:57)        08-31    140  |  104  |  19  ----------------------------<  207<H>  3.6   |  25  |  1.05    Ca    8.3<L>      31 Aug 2020 00:25  Phos  2.1     08-31  Mg     2.0     08-31                            11.0   9.76  )-----------( 146      ( 31 Aug 2020 00:24 )             34.5                 Thyroid Function Tests: Chief Complaint: new onset DM presenting with DKA    History: Pt seen and examined. States she is feeling well. Tolerating PO diet but doesn't like the food very much.     MEDICATIONS  (STANDING):  allopurinol 100 milliGRAM(s) Oral every 12 hours  aspirin  chewable 81 milliGRAM(s) Oral daily  atorvastatin 80 milliGRAM(s) Oral at bedtime  brimonidine 0.2% Ophthalmic Solution 1 Drop(s) Both EYES two times a day  cefepime   IVPB 2000 milliGRAM(s) IV Intermittent every 8 hours  chlorhexidine 4% Liquid 1 Application(s) Topical <User Schedule>  dextrose 5%. 1000 milliLiter(s) (50 mL/Hr) IV Continuous <Continuous>  dextrose 50% Injectable 12.5 Gram(s) IV Push once  dextrose 50% Injectable 25 Gram(s) IV Push once  dextrose 50% Injectable 25 Gram(s) IV Push once  enoxaparin Injectable 40 milliGRAM(s) SubCutaneous every 12 hours  insulin glargine Injectable (LANTUS) 38 Unit(s) SubCutaneous at bedtime  insulin lispro (HumaLOG) corrective regimen sliding scale   SubCutaneous Before meals and at bedtime  insulin lispro Injectable (HumaLOG) 14 Unit(s) SubCutaneous three times a day with meals  latanoprost 0.005% Ophthalmic Solution 1 Drop(s) Both EYES at bedtime  levETIRAcetam  IVPB 1000 milliGRAM(s) IV Intermittent every 12 hours  lidocaine   Patch 1 Patch Transdermal daily  lisinopril 40 milliGRAM(s) Oral daily  niCARdipine 40 milliGRAM(s) Oral three times a day  polyethylene glycol 3350 17 Gram(s) Oral daily    MEDICATIONS  (PRN):  dextrose 40% Gel 15 Gram(s) Oral once PRN Blood Glucose LESS THAN 70 milliGRAM(s)/deciLiter  glucagon  Injectable 1 milliGRAM(s) IntraMuscular once PRN Glucose <70 milliGRAM(s)/deciLiter  labetalol Injectable 10 milliGRAM(s) IV Push every 6 hours PRN BP > 180  melatonin 3 milliGRAM(s) Oral at bedtime PRN Insomnia      PHYSICAL EXAM:  Vital Signs Last 24 Hrs  T(C): 36.8 (31 Aug 2020 08:00), Max: 36.9 (31 Aug 2020 00:00)  T(F): 98.2 (31 Aug 2020 08:00), Max: 98.5 (31 Aug 2020 00:00)  HR: 75 (31 Aug 2020 15:00) (69 - 105)  BP: 139/74 (31 Aug 2020 15:00) (124/57 - 160/87)  BP(mean): 99 (31 Aug 2020 15:00) (82 - 114)  RR: 20 (31 Aug 2020 15:00) (15 - 38)  SpO2: 94% (31 Aug 2020 15:00) (92% - 100%)  GENERAL: awake, alert, NAD, obese  EYES: No proptosis, anicteric  RESPIRATORY: CTA b/l, no wheezes or crackles   CARDIOVASCULAR: S1S2 RRR; no murmurs; no peripheral edema  GI: Soft, nontender, non distended, normal bowel sounds      CAPILLARY BLOOD GLUCOSE  POCT Blood Glucose.: 238 mg/dL (31 Aug 2020 12:08)  POCT Blood Glucose.: 257 mg/dL (31 Aug 2020 08:30)  POCT Blood Glucose.: 189 mg/dL (30 Aug 2020 21:13)  POCT Blood Glucose.: 245 mg/dL (30 Aug 2020 16:57)        08-31    140  |  104  |  19  ----------------------------<  207<H>  3.6   |  25  |  1.05    Ca    8.3<L>      31 Aug 2020 00:25  Phos  2.1     08-31  Mg     2.0     08-31                            11.0   9.76  )-----------( 146      ( 31 Aug 2020 00:24 )             34.5

## 2020-08-31 NOTE — PROGRESS NOTE ADULT - PROBLEM SELECTOR PLAN 3
-atorvastatin 80mg po qhs  Patient seen and examined. Adjusted note as needed above.  Alexia Oconnor MD  Pager: [phone], between 9am-6pm  Nights and Weekends: [phone]

## 2020-08-31 NOTE — PROGRESS NOTE ADULT - PROBLEM SELECTOR PROBLEM 2
Diabetic ketoacidosis without coma associated with type 2 diabetes mellitus Hypertension, unspecified type

## 2020-08-31 NOTE — PROGRESS NOTE ADULT - SUBJECTIVE AND OBJECTIVE BOX
SUBJECTIVE: patient seen and examined at bedside. off eeg, not intubated. reports hip pain that she has had.    MEDICATIONS (HOME):  Home Medications:  allopurinol 100 mg oral tablet: 1 tab(s) orally 2 times a day (26 Aug 2020 04:57)  Alphagan: 1 drop(s) to each affected eye 2 times a day (26 Aug 2020 04:57)  cyclobenzaprine 10 mg oral tablet: 1 tab(s) orally 3 times a day (26 Aug 2020 04:57)  hydrOXYzine hydrochloride 10 mg oral tablet: 2 tab(s) orally once a day, As Needed (26 Aug 2020 04:57)  lisinopril 40 mg oral tablet: 1 tab(s) orally once a day (at bedtime) (26 Aug 2020 04:57)    MEDICATIONS  (STANDING):  allopurinol 100 milliGRAM(s) Oral every 12 hours  aspirin  chewable 81 milliGRAM(s) Oral daily  atorvastatin 80 milliGRAM(s) Oral at bedtime  brimonidine 0.2% Ophthalmic Solution 1 Drop(s) Both EYES two times a day  cefepime   IVPB 2000 milliGRAM(s) IV Intermittent every 8 hours  chlorhexidine 4% Liquid 1 Application(s) Topical <User Schedule>  dextrose 5%. 1000 milliLiter(s) (50 mL/Hr) IV Continuous <Continuous>  dextrose 50% Injectable 12.5 Gram(s) IV Push once  dextrose 50% Injectable 25 Gram(s) IV Push once  dextrose 50% Injectable 25 Gram(s) IV Push once  enoxaparin Injectable 40 milliGRAM(s) SubCutaneous every 12 hours  insulin glargine Injectable (LANTUS) 38 Unit(s) SubCutaneous at bedtime  insulin lispro (HumaLOG) corrective regimen sliding scale   SubCutaneous Before meals and at bedtime  insulin lispro Injectable (HumaLOG) 14 Unit(s) SubCutaneous three times a day with meals  latanoprost 0.005% Ophthalmic Solution 1 Drop(s) Both EYES at bedtime  levETIRAcetam  IVPB 1000 milliGRAM(s) IV Intermittent every 12 hours  lidocaine   Patch 1 Patch Transdermal daily  lisinopril 40 milliGRAM(s) Oral daily  niCARdipine 40 milliGRAM(s) Oral three times a day  polyethylene glycol 3350 17 Gram(s) Oral daily    MEDICATIONS  (PRN):  dextrose 40% Gel 15 Gram(s) Oral once PRN Blood Glucose LESS THAN 70 milliGRAM(s)/deciLiter  glucagon  Injectable 1 milliGRAM(s) IntraMuscular once PRN Glucose <70 milliGRAM(s)/deciLiter  labetalol Injectable 10 milliGRAM(s) IV Push every 6 hours PRN BP > 180  melatonin 3 milliGRAM(s) Oral at bedtime PRN Insomnia    ALLERGIES/INTOLERANCES:  Allergies  No Known Allergies    VITALS & EXAMINATION:  Vital Signs Last 24 Hrs  T(C): 36.8 (31 Aug 2020 08:00), Max: 36.9 (31 Aug 2020 00:00)  T(F): 98.2 (31 Aug 2020 08:00), Max: 98.5 (31 Aug 2020 00:00)  HR: 70 (31 Aug 2020 12:00) (69 - 105)  BP: 149/87 (31 Aug 2020 12:00) (124/57 - 160/82)  BP(mean): 109 (31 Aug 2020 12:00) (82 - 114)  RR: 21 (31 Aug 2020 12:00) (15 - 38)  SpO2: 98% (31 Aug 2020 12:00) (92% - 100%)    Neurological (>12):  MS: eyes open, alert, oriented to person, place, situation, time. Normal affect. Follows all commands.    Language: Speech is clear, fluent with good repetition & comprehension (able to name objects___)    CNs: PERRL (R = 3mm, L = 3mm). VFF. EOMI no nystagmus, V1-3 intact to LT/pinprick, well developed masseter muscles b/l. No facial asymmetry b/l, full eye closure strength b/l. Hearing grossly normal (rubbing fingers) b/l. Symmetric palate elevation in midline. Gag reflex deferred. Head turning & shoulder shrug intact b/l. Tongue midline, normal movements, no atrophy.    Motor: no resting or kinetic tremor noted, no drift in any extremity, grossly strong throughout with pain limitation in right leg due to pain	     Sensation: Intact to LT b/l throughout.     Cortical: Extinction on DSS (neglect): none    Coordination: intact rapid-alt movements. No dysmetria to FTN    Gait: deferred    LABORATORY:  CBC                       11.0   9.76  )-----------( 146      ( 31 Aug 2020 00:24 )             34.5     Chem 08-31    140  |  104  |  19  ----------------------------<  207<H>  3.6   |  25  |  1.05    Ca    8.3<L>      31 Aug 2020 00:25  Phos  2.1     08-31  Mg     2.0     08-31      LFTs   Coagulopathy PT/INR - ( 31 Aug 2020 00:24 )   PT: 12.7 sec;   INR: 1.07 ratio         PTT - ( 31 Aug 2020 00:24 )  PTT:33.2 sec  Lipid Panel 08-28 Chol 150 LDL 62 HDL 67 Trig 105    STUDIES & IMAGING:  Studies (EKG, EEG, EMG, etc):     Radiology (XR, CT, MR, U/S, TTE/FILI):    < from: CT Head No Cont (08.26.20 @ 01:18) >  IMPRESSION:  NONCONTRAST HEAD CT SCAN:  1. No CT evidence of acute intracranial hemorrhage, mass effect or acute territorial infarct.  2. There is a 1.3 cm lobulated bony density seen posterior to the to the left IAC in the left CP angle, nonspecific, may represent meningioma. Further evaluation with MRI can be obtained..    CT ANGIOGRAPHY NECK: Patent cervical vasculature.  No hemodynamically significant carotid stenosis or flow-limiting vertebral artery stenosis.  No evidence of dissection.    CT ANGIOGRAPHY BRAIN: No vessel occlusion, flow-limiting stenosis or aneurysm is identified about the [place].    < end of copied text >    EEG IMPRESSION/CLINICAL CORRELATE    Abnormal EEG study.  1. Mild to moderate  nonspecific diffuse or multifocal cerebral dysfunction.   2. No epileptiform pattern or seizure seen.

## 2020-08-31 NOTE — PROGRESS NOTE ADULT - ASSESSMENT
70Y RH F w/ h/o glaucoma, morbid obesity, chronic shoulder pain, gout, B/L LE weakness with knee issues, GCA and HTN who p/w 2 weeks of generalized weakness and 2 days of R. arm and leg tremor.  While in ED, several episodes lasting 30-60 seconds noted with painful spasm of neck to right a/w 1-3 Hz low amplitude shaking of R. arm and R. leg with preserved awareness and consciousness w/o tongue biting, gaze deviation, incontinence or post event confusion.  PT was also notably hypertensive to 206/102 and there was initial concern for vascular induced vs HTN induced L. sided lesion causing acute new onset right sided dystonia.  Upon further lab review, PT found to be in hyperosmolar hyperglycemic state w/ glucose >700.  Per ED and MICU team, PT continued to have several of these episodes but then had two generalized clonic events with acute stupor and dilated pupils lasting up to 2 minutes.  Due to concern for GTCs, PT was given ativan x2, intubated for airway protection and started on propofol and versed gtt. Currently extubated and off EEG monitoring.     Impression: Acute hyperglycemia induced dystonia with tremor vs. possible R. sided EPC from left brain dysfunction and then subsequent generalized seizure. Extubated, off sedation, mental status with minimal improvement.   on imaging has 1.3cm bony density L IAC that has not been imaged at this time    Recommend:   [x] for now c/w keppra 1 g IV BID for now. Monitor renal function given recent JOVANNI   [] off EEG, can keep off EEG  [] Ativan 2mg IVP for any GTC and/or focal seizure >3 min.   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 MRI can be ordered once off EEG if patient able to tolerate - still recommending MRI to be done, patient amenable to MRI with small amount of sedating medication (valium low dose prior to MRI)  [] Continue ASA 81 daily, atorvastatin 80 with concern of possible stroke as new onset focal lesion.    mgmt to be discussed with Neurology Attending Dr. Allen

## 2020-09-01 LAB
ANION GAP SERPL CALC-SCNC: 12 MMOL/L — SIGNIFICANT CHANGE UP (ref 5–17)
APTT BLD: 33.6 SEC — SIGNIFICANT CHANGE UP (ref 27.5–35.5)
BUN SERPL-MCNC: 20 MG/DL — SIGNIFICANT CHANGE UP (ref 7–23)
CALCIUM SERPL-MCNC: 7.9 MG/DL — LOW (ref 8.4–10.5)
CHLORIDE SERPL-SCNC: 102 MMOL/L — SIGNIFICANT CHANGE UP (ref 96–108)
CO2 SERPL-SCNC: 25 MMOL/L — SIGNIFICANT CHANGE UP (ref 22–31)
CREAT SERPL-MCNC: 1.1 MG/DL — SIGNIFICANT CHANGE UP (ref 0.5–1.3)
GLUCOSE BLDC GLUCOMTR-MCNC: 125 MG/DL — HIGH (ref 70–99)
GLUCOSE BLDC GLUCOMTR-MCNC: 128 MG/DL — HIGH (ref 70–99)
GLUCOSE BLDC GLUCOMTR-MCNC: 145 MG/DL — HIGH (ref 70–99)
GLUCOSE BLDC GLUCOMTR-MCNC: 163 MG/DL — HIGH (ref 70–99)
GLUCOSE SERPL-MCNC: 200 MG/DL — HIGH (ref 70–99)
HCT VFR BLD CALC: 31.8 % — LOW (ref 34.5–45)
HGB BLD-MCNC: 10.2 G/DL — LOW (ref 11.5–15.5)
INR BLD: 1.04 RATIO — SIGNIFICANT CHANGE UP (ref 0.88–1.16)
MAGNESIUM SERPL-MCNC: 1.8 MG/DL — SIGNIFICANT CHANGE UP (ref 1.6–2.6)
MCHC RBC-ENTMCNC: 30 PG — SIGNIFICANT CHANGE UP (ref 27–34)
MCHC RBC-ENTMCNC: 32.1 GM/DL — SIGNIFICANT CHANGE UP (ref 32–36)
MCV RBC AUTO: 93.5 FL — SIGNIFICANT CHANGE UP (ref 80–100)
NRBC # BLD: 0 /100 WBCS — SIGNIFICANT CHANGE UP (ref 0–0)
PHOSPHATE SERPL-MCNC: 2.3 MG/DL — LOW (ref 2.5–4.5)
PLATELET # BLD AUTO: 145 K/UL — LOW (ref 150–400)
POTASSIUM SERPL-MCNC: 3.1 MMOL/L — LOW (ref 3.5–5.3)
POTASSIUM SERPL-SCNC: 3.1 MMOL/L — LOW (ref 3.5–5.3)
PROTHROM AB SERPL-ACNC: 12.4 SEC — SIGNIFICANT CHANGE UP (ref 10.6–13.6)
RBC # BLD: 3.4 M/UL — LOW (ref 3.8–5.2)
RBC # FLD: 13.6 % — SIGNIFICANT CHANGE UP (ref 10.3–14.5)
SODIUM SERPL-SCNC: 139 MMOL/L — SIGNIFICANT CHANGE UP (ref 135–145)
WBC # BLD: 8.95 K/UL — SIGNIFICANT CHANGE UP (ref 3.8–10.5)
WBC # FLD AUTO: 8.95 K/UL — SIGNIFICANT CHANGE UP (ref 3.8–10.5)

## 2020-09-01 PROCEDURE — 99291 CRITICAL CARE FIRST HOUR: CPT

## 2020-09-01 PROCEDURE — 70553 MRI BRAIN STEM W/O & W/DYE: CPT | Mod: 26

## 2020-09-01 PROCEDURE — 99233 SBSQ HOSP IP/OBS HIGH 50: CPT

## 2020-09-01 RX ORDER — TRAMADOL HYDROCHLORIDE 50 MG/1
25 TABLET ORAL ONCE
Refills: 0 | Status: DISCONTINUED | OUTPATIENT
Start: 2020-09-01 | End: 2020-09-01

## 2020-09-01 RX ORDER — GABAPENTIN 400 MG/1
100 CAPSULE ORAL DAILY
Refills: 0 | Status: DISCONTINUED | OUTPATIENT
Start: 2020-09-01 | End: 2020-09-02

## 2020-09-01 RX ORDER — HYDROMORPHONE HYDROCHLORIDE 2 MG/ML
0.2 INJECTION INTRAMUSCULAR; INTRAVENOUS; SUBCUTANEOUS ONCE
Refills: 0 | Status: DISCONTINUED | OUTPATIENT
Start: 2020-09-01 | End: 2020-09-01

## 2020-09-01 RX ORDER — POTASSIUM PHOSPHATE, MONOBASIC POTASSIUM PHOSPHATE, DIBASIC 236; 224 MG/ML; MG/ML
30 INJECTION, SOLUTION INTRAVENOUS ONCE
Refills: 0 | Status: COMPLETED | OUTPATIENT
Start: 2020-09-01 | End: 2020-09-01

## 2020-09-01 RX ORDER — ACETAMINOPHEN 500 MG
650 TABLET ORAL EVERY 6 HOURS
Refills: 0 | Status: DISCONTINUED | OUTPATIENT
Start: 2020-09-01 | End: 2020-09-02

## 2020-09-01 RX ORDER — INSULIN LISPRO 100/ML
8 VIAL (ML) SUBCUTANEOUS ONCE
Refills: 0 | Status: COMPLETED | OUTPATIENT
Start: 2020-09-01 | End: 2020-09-01

## 2020-09-01 RX ADMIN — LIDOCAINE 1 PATCH: 4 CREAM TOPICAL at 20:05

## 2020-09-01 RX ADMIN — NICARDIPINE HYDROCHLORIDE 40 MILLIGRAM(S): 30 CAPSULE, EXTENDED RELEASE ORAL at 21:30

## 2020-09-01 RX ADMIN — CEFEPIME 100 MILLIGRAM(S): 1 INJECTION, POWDER, FOR SOLUTION INTRAMUSCULAR; INTRAVENOUS at 21:31

## 2020-09-01 RX ADMIN — NICARDIPINE HYDROCHLORIDE 40 MILLIGRAM(S): 30 CAPSULE, EXTENDED RELEASE ORAL at 13:24

## 2020-09-01 RX ADMIN — LIDOCAINE 1 PATCH: 4 CREAM TOPICAL at 00:30

## 2020-09-01 RX ADMIN — Medication 8 UNIT(S): at 08:43

## 2020-09-01 RX ADMIN — GABAPENTIN 100 MILLIGRAM(S): 400 CAPSULE ORAL at 17:25

## 2020-09-01 RX ADMIN — Medication 16 UNIT(S): at 17:45

## 2020-09-01 RX ADMIN — Medication 100 MILLIGRAM(S): at 06:13

## 2020-09-01 RX ADMIN — ENOXAPARIN SODIUM 40 MILLIGRAM(S): 100 INJECTION SUBCUTANEOUS at 17:25

## 2020-09-01 RX ADMIN — HYDROMORPHONE HYDROCHLORIDE 0.2 MILLIGRAM(S): 2 INJECTION INTRAMUSCULAR; INTRAVENOUS; SUBCUTANEOUS at 17:25

## 2020-09-01 RX ADMIN — BRIMONIDINE TARTRATE 1 DROP(S): 2 SOLUTION/ DROPS OPHTHALMIC at 06:13

## 2020-09-01 RX ADMIN — NICARDIPINE HYDROCHLORIDE 40 MILLIGRAM(S): 30 CAPSULE, EXTENDED RELEASE ORAL at 06:14

## 2020-09-01 RX ADMIN — PANTOPRAZOLE SODIUM 40 MILLIGRAM(S): 20 TABLET, DELAYED RELEASE ORAL at 12:11

## 2020-09-01 RX ADMIN — Medication 650 MILLIGRAM(S): at 08:31

## 2020-09-01 RX ADMIN — HYDROMORPHONE HYDROCHLORIDE 0.2 MILLIGRAM(S): 2 INJECTION INTRAMUSCULAR; INTRAVENOUS; SUBCUTANEOUS at 17:55

## 2020-09-01 RX ADMIN — POTASSIUM PHOSPHATE, MONOBASIC POTASSIUM PHOSPHATE, DIBASIC 83.33 MILLIMOLE(S): 236; 224 INJECTION, SOLUTION INTRAVENOUS at 02:13

## 2020-09-01 RX ADMIN — Medication 650 MILLIGRAM(S): at 09:30

## 2020-09-01 RX ADMIN — Medication 100 MILLIGRAM(S): at 17:25

## 2020-09-01 RX ADMIN — CEFEPIME 100 MILLIGRAM(S): 1 INJECTION, POWDER, FOR SOLUTION INTRAMUSCULAR; INTRAVENOUS at 06:13

## 2020-09-01 RX ADMIN — Medication 81 MILLIGRAM(S): at 12:11

## 2020-09-01 RX ADMIN — ENOXAPARIN SODIUM 40 MILLIGRAM(S): 100 INJECTION SUBCUTANEOUS at 06:13

## 2020-09-01 RX ADMIN — LATANOPROST 1 DROP(S): 0.05 SOLUTION/ DROPS OPHTHALMIC; TOPICAL at 21:31

## 2020-09-01 RX ADMIN — LIDOCAINE 1 PATCH: 4 CREAM TOPICAL at 12:10

## 2020-09-01 RX ADMIN — Medication 16 UNIT(S): at 12:42

## 2020-09-01 RX ADMIN — ATORVASTATIN CALCIUM 80 MILLIGRAM(S): 80 TABLET, FILM COATED ORAL at 21:31

## 2020-09-01 RX ADMIN — INSULIN GLARGINE 45 UNIT(S): 100 INJECTION, SOLUTION SUBCUTANEOUS at 22:00

## 2020-09-01 RX ADMIN — TRAMADOL HYDROCHLORIDE 25 MILLIGRAM(S): 50 TABLET ORAL at 15:37

## 2020-09-01 RX ADMIN — LISINOPRIL 40 MILLIGRAM(S): 2.5 TABLET ORAL at 06:14

## 2020-09-01 RX ADMIN — TRAMADOL HYDROCHLORIDE 25 MILLIGRAM(S): 50 TABLET ORAL at 16:30

## 2020-09-01 RX ADMIN — BRIMONIDINE TARTRATE 1 DROP(S): 2 SOLUTION/ DROPS OPHTHALMIC at 18:31

## 2020-09-01 RX ADMIN — LEVETIRACETAM 400 MILLIGRAM(S): 250 TABLET, FILM COATED ORAL at 06:13

## 2020-09-01 RX ADMIN — Medication 2: at 12:42

## 2020-09-01 RX ADMIN — CEFEPIME 100 MILLIGRAM(S): 1 INJECTION, POWDER, FOR SOLUTION INTRAMUSCULAR; INTRAVENOUS at 14:24

## 2020-09-01 RX ADMIN — LEVETIRACETAM 400 MILLIGRAM(S): 250 TABLET, FILM COATED ORAL at 17:25

## 2020-09-01 RX ADMIN — CHLORHEXIDINE GLUCONATE 1 APPLICATION(S): 213 SOLUTION TOPICAL at 06:14

## 2020-09-01 RX ADMIN — POLYETHYLENE GLYCOL 3350 17 GRAM(S): 17 POWDER, FOR SOLUTION ORAL at 12:11

## 2020-09-01 NOTE — PROGRESS NOTE ADULT - ASSESSMENT
#Neuro: Generalized seizure/Encephalopathy 2/2 hyperglycemic / hyperosmolar shift now new loculated density in the LIAC concerning for meningioma awaiting MRI brain  - no h/o seizure reported- no further evidence of seizure activity on EEG's  - per neuro c/w Keppra 1gm/q12H- if no further seizures and no evidence of meningeoma pos d/c  -c/w ASA and Statin  -c/w neuro checks as per protocol  - f/u MRI head [5pm today]    #Pulm: Acute hypoxemic Respiratory Failure s/p intubation for airway protection 2/2 AMS / encephalopathy /generalized seizure activity s/p extubation 8/27- doing well on RA  -c/w RA- O2 sats % over last 24hrs  - NC prn if O2 sats <90%  -aspiration precautions  -Incentive spirometry  -Chest PT    #CV: A/W Hypertensive urgency () s/p total Labetalol 40 IV on admission s/p cardene gtt [08/29] bridged to nicardipine PO  - c/w Lisinopril 40 qd, Nicardipine 40 tid  - c/w Labetalol PRN- 10mg IVP w paratmeters  - monitor vitals     #GI/: No active issues  - s/p NPO- keofeed in place- tolerated TF- passed BS swallow- now on PO soft diet / diabetic  - c/w bowel regimen  - c/w PPI daily    #ID: Afebrile, no leukocytosis at presentation had been initially empirically treated with ceftriaxone for suspicion of meningitis then at a lower dose of aspiration pna now d/c'd    - sputum + pseudomonas A.   - c/w Cefepime: day #4/5     #FEN/ENDO: Hyperglycemic Hyperosmolar State vs DKA / HHNK s/p insulin gtt transitioned to HISS and lantus- now resolved  - new dx of DM on this admission  - HbA1c 9.0  - increased basal -> Lantus 45U qhs [08/31-], low ss correction at bedtime  - increased pre-meal -> Humalog 16U tid [08/31-], seperate low ss correction for pre-meals  - 08/29 11:30pm lantus 34 ---> 08/30: 5am [fs 227] 4U ss, 12pm [fs 335] 8U pre-meal + 8U ss, 5pm [fs 245] 14U premeal + 4U ss, 9pm [fs 189] 38U lantus + 2U ss  - endocrine on board  - monitor POCT glu    #HEME:  No active issues  - DVT ppx with Lovenox 40q12     #MSK  chronic hip pain   - lidocaine patch qd, tyenol prn  - PT Consult

## 2020-09-01 NOTE — PROGRESS NOTE ADULT - PROBLEM SELECTOR PLAN 1
-Test BG ac and hs  - C/w Lantus 45 units qhs   - C/w Humalog 16 units TID pre-meal. If BG <100s can obtain order for 50% of the dose. Will adjust as needed  - C/w moderate pre-meal Humalog correction scale and low dose at hs   - Awaiting for f/u CATY-65 antibody, zinc transporter antibody and IA2 and  - Islet cell antibody negative. Pt most likely has T2DM with DKA in the setting of acute infection and undiagnosed DM.   - consider abdominal imaging to r/o pancreatic cancer  -Teach finger sticks and insulin administration. Spoke to pt's RN  Discharge:  - Basal/bolus, doses TBD  -Can follow at [facility] practice. [address]. Phone   Please write Rxs for: Solo Star Insulin pen/Humalog Kwik insulin pen/Esther insulin pen/glucose meter/strips/lancets  -Plan discussed with pt/team.  Contact info: [phone] (24/7). pager [phone]
Newly diagnosed diabetes with HbA1C 9.0 presenting with DKA, suspect ketosis prone type 2 given obesity. However, given acute development of marked hyperglycemia and diabetes, other possible etiologies include DENTON and pancreatic cancer. DKA resolved. Pt now on PO diet. BG remain above goal.   - increase Lantus to 45 units qhs   - increase Humalog to 16 units TID pre-meal  - moderate pre-meal Humalog correction scale and separate low bed time Humalog correction scale   - f/u CATY-65 antibody  - please order zinc transporter antibody and IA2 anb  - Islet cell antibody negative  - consider abdominal imaging to r/o pancreatic cancer  -insulin pen teaching  Discharge:  - Basal/bolus, doses TBD

## 2020-09-01 NOTE — PROGRESS NOTE ADULT - SUBJECTIVE AND OBJECTIVE BOX
DIABETES FOLLOW UP NOTE: Saw pt earlier today  INTERVAL HX: 71 y/o F with none known DM history (+ family hx of T2DM>mother). H/o HTN, Gout, obesity (BMI> 48.1), presenting w/ b/l ankle swelling, fatigue/sleepiness and generalized weakness  x several days. Found to have DKA requiring MICU admission for insulin drip> + seizure while in MICU. Now off insulin drip, feeling better and tolerating POs. Reports having polyuria and polydipsia PTA and pt suspected she had DM. Likely new T2DM (A1C 9%). BG levels improved today  (100s) while on present insulin doses. Received 50% of the Humalog dose for breakfast with f/u BG in 160s> Received full dose of humalog for lunch. C/o lower back pain > on pain meds. On antibiotic for pseudomonas.       Review of Systems:  General: As above  Cardiovascular: No chest pain, palpitations  Respiratory: No SOB, no cough  GI: No nausea, vomiting, abdominal pain  Endocrine: no polyuria, polydipsia or S&Sx of hypoglycemia    Allergies    No Known Allergies    Intolerances      MEDICATIONS:  allopurinol 100 milliGRAM(s) Oral every 12 hours  atorvastatin 80 milliGRAM(s) Oral at bedtime  cefepime   IVPB 2000 milliGRAM(s) IV Intermittent every 8 hours  insulin glargine Injectable (LANTUS) 45 Unit(s) SubCutaneous at bedtime  insulin lispro (HumaLOG) corrective regimen sliding scale   SubCutaneous Before meals and at bedtime  insulin lispro Injectable (HumaLOG) 16 Unit(s) SubCutaneous three times a day before meals      PHYSICAL EXAM:  VITALS: T(C): 36.8 (09-01-20 @ 12:00)  T(F): 98.3 (09-01-20 @ 12:00), Max: 98.5 (08-31-20 @ 20:00)  HR: 92 (09-01-20 @ 14:25) (64 - 105)  BP: 156/76 (09-01-20 @ 14:25) (124/58 - 179/76)  RR:  (12 - 44)  SpO2:  (91% - 99%)  Wt(kg): --  GENERAL: Obese female sitting at edge of bed in NAD  Abdomen: Soft, nontender, non distended, obese  Extremities: Warm, minimal ankle edema in LEs   NEURO: A&O X3    LABS:  POCT Blood Glucose.: 163 mg/dL (09-01-20 @ 12:40)  POCT Blood Glucose.: 128 mg/dL (09-01-20 @ 08:35)  POCT Blood Glucose.: 192 mg/dL (08-31-20 @ 22:42)  POCT Blood Glucose.: 170 mg/dL (08-31-20 @ 17:09)  POCT Blood Glucose.: 238 mg/dL (08-31-20 @ 12:08)  POCT Blood Glucose.: 257 mg/dL (08-31-20 @ 08:30)  POCT Blood Glucose.: 189 mg/dL (08-30-20 @ 21:13)  POCT Blood Glucose.: 245 mg/dL (08-30-20 @ 16:57)  POCT Blood Glucose.: 335 mg/dL (08-30-20 @ 12:05)  POCT Blood Glucose.: 227 mg/dL (08-30-20 @ 04:58)  POCT Blood Glucose.: 235 mg/dL (08-29-20 @ 23:10)  POCT Blood Glucose.: 223 mg/dL (08-29-20 @ 18:17)                            10.2   8.95  )-----------( 145      ( 01 Sep 2020 00:21 )             31.8       09-01    139  |  102  |  20  ----------------------------<  200<H>  3.1<L>   |  25  |  1.10    EGFR if : 58<L>      Ca    7.9<L>      09-01  Mg     1.8     09-01  Phos  2.3     09-01      A1C with Estimated Average Glucose Result: 9.0 % (08-26-20 @ 05:50)      Estimated Average Glucose: 212 mg/dL (08-26-20 @ 05:50)        08-28 Chol 150 LDL 62 HDL 67 Trig 105

## 2020-09-01 NOTE — PROGRESS NOTE ADULT - SUBJECTIVE AND OBJECTIVE BOX
MICU PROGRESS NOTE  Lazara Andrew, PGY1 Green Team Intern     INTERVAL HPI/OVERNIGHT EVENTS:  No Overnight Events.     SUBJECTIVE:   Patient seen and examined at bedside.     REVIEW OF SYMPTOMS:  unable to obtain due to patient mentation     OBJECTIVE:    VITAL SIGNS:  ICU Vital Signs Last 24 Hrs  T(C): 36.5 (01 Sep 2020 08:00), Max: 36.9 (31 Aug 2020 20:00)  T(F): 97.7 (01 Sep 2020 08:00), Max: 98.5 (31 Aug 2020 20:00)  HR: 97 (01 Sep 2020 12:00) (64 - 104)  BP: 159/84 (01 Sep 2020 12:00) (124/58 - 176/77)  BP(mean): 111 (01 Sep 2020 12:00) (83 - 112)  ABP: --  ABP(mean): --  RR: 30 (01 Sep 2020 12:00) (12 - 33)  SpO2: 93% (01 Sep 2020 12:00) (91% - 98%)        08-31 @ 07:01 - 09-01 @ 07:00  --------------------------------------------------------  IN: 1636.5 mL / OUT: 750 mL / NET: 886.5 mL    09-01 @ 07:01 - 09-01 @ 12:45  --------------------------------------------------------  IN: 323.3 mL / OUT: 0 mL / NET: 323.3 mL      CAPILLARY BLOOD GLUCOSE      POCT Blood Glucose.: 163 mg/dL (01 Sep 2020 12:40)      PHYSICAL EXAM:  General: NAD  HEENT: NC/AT; PERRL, clear conjunctiva  Neck: supple  Respiratory: CTA b/l  Cardiovascular: +S1/S2; RRR  Abdomen: soft, NT/ND; +BS x4  Extremities: WWP, 2+ peripheral pulses b/l; no LE edema  Skin: normal color and turgor; no rash  Neurological:    MEDICATIONS:  MEDICATIONS  (STANDING):  allopurinol 100 milliGRAM(s) Oral every 12 hours  aspirin  chewable 81 milliGRAM(s) Oral daily  atorvastatin 80 milliGRAM(s) Oral at bedtime  brimonidine 0.2% Ophthalmic Solution 1 Drop(s) Both EYES two times a day  cefepime   IVPB 2000 milliGRAM(s) IV Intermittent every 8 hours  chlorhexidine 4% Liquid 1 Application(s) Topical <User Schedule>  dextrose 5%. 1000 milliLiter(s) (50 mL/Hr) IV Continuous <Continuous>  dextrose 50% Injectable 12.5 Gram(s) IV Push once  dextrose 50% Injectable 25 Gram(s) IV Push once  dextrose 50% Injectable 25 Gram(s) IV Push once  enoxaparin Injectable 40 milliGRAM(s) SubCutaneous every 12 hours  insulin glargine Injectable (LANTUS) 45 Unit(s) SubCutaneous at bedtime  insulin lispro (HumaLOG) corrective regimen sliding scale   SubCutaneous Before meals and at bedtime  insulin lispro Injectable (HumaLOG) 16 Unit(s) SubCutaneous three times a day before meals  latanoprost 0.005% Ophthalmic Solution 1 Drop(s) Both EYES at bedtime  levETIRAcetam  IVPB 1000 milliGRAM(s) IV Intermittent every 12 hours  lidocaine   Patch 1 Patch Transdermal daily  lisinopril 40 milliGRAM(s) Oral daily  niCARdipine 40 milliGRAM(s) Oral three times a day  pantoprazole    Tablet 40 milliGRAM(s) Oral daily  polyethylene glycol 3350 17 Gram(s) Oral daily    MEDICATIONS  (PRN):  acetaminophen   Tablet .. 650 milliGRAM(s) Oral every 6 hours PRN Temp greater or equal to 38C (100.4F), Mild Pain (1 - 3)  dextrose 40% Gel 15 Gram(s) Oral once PRN Blood Glucose LESS THAN 70 milliGRAM(s)/deciLiter  glucagon  Injectable 1 milliGRAM(s) IntraMuscular once PRN Glucose <70 milliGRAM(s)/deciLiter  labetalol Injectable 10 milliGRAM(s) IV Push every 6 hours PRN BP > 180  melatonin 3 milliGRAM(s) Oral at bedtime PRN Insomnia      ALLERGIES:  Allergies    No Known Allergies    Intolerances        LABS:                        10.2   8.95  )-----------( 145      ( 01 Sep 2020 00:21 )             31.8     09-01    139  |  102  |  20  ----------------------------<  200<H>  3.1<L>   |  25  |  1.10    Ca    7.9<L>      01 Sep 2020 00:22  Phos  2.3     09-01  Mg     1.8     09-01      PT/INR - ( 01 Sep 2020 00:21 )   PT: 12.4 sec;   INR: 1.04 ratio         PTT - ( 01 Sep 2020 00:21 )  PTT:33.6 sec      RADIOLOGY & ADDITIONAL TESTS: Reviewed. MICU PROGRESS NOTE  Lazara Andrew, PGY1 Green Team Intern     INTERVAL HPI/OVERNIGHT EVENTS:  No Overnight Events.     SUBJECTIVE:   Patient seen and examined at bedside. Stable, on bedboard. Endorses chronic hip pain with improvement on lidocaine patch / lidocaine patch.     REVIEW OF SYMPTOMS:  GENERAL: no weight change, no fatigue, no weakness, no fevers, no chills, no night sweats  HEAD: no trauma, no headache  EYES: no visual changes; no blurriness, no tearing, no itching   MOUTH, THROAT, NECK: no hoarseness, no throat pain, no neck swelling or stiffness    RESPIRATORY: no SOB, no cough, no wheezing, no sputum or hemoptysis  CARDIAC: no chest pain or palpitations, no dyspnea on exertion  VASCULAR: no leg edema, no varicose veins, no claudication   GASTROINTESTINAL: no changes in appetite, no N/V, no changes in bowel movement frequency/color, no diarrhea or constipation, no abdominal pain, +epigastric pain  URINARY: no dysuria, polyuria, or hematuria  NEUROLOGICAL: No numbness, no tingling, no tremors, no weakness or paralysis, no fainting or  black outs   MSK: +hip pain  All other review of systems is negative unless indicated above.     OBJECTIVE:    VITAL SIGNS:  ICU Vital Signs Last 24 Hrs  T(C): 36.5 (01 Sep 2020 08:00), Max: 36.9 (31 Aug 2020 20:00)  T(F): 97.7 (01 Sep 2020 08:00), Max: 98.5 (31 Aug 2020 20:00)  HR: 97 (01 Sep 2020 12:00) (64 - 104)  BP: 159/84 (01 Sep 2020 12:00) (124/58 - 176/77)  BP(mean): 111 (01 Sep 2020 12:00) (83 - 112)  ABP: --  ABP(mean): --  RR: 30 (01 Sep 2020 12:00) (12 - 33)  SpO2: 93% (01 Sep 2020 12:00) (91% - 98%)        08-31 @ 07:01  -  09-01 @ 07:00  --------------------------------------------------------  IN: 1636.5 mL / OUT: 750 mL / NET: 886.5 mL    09-01 @ 07:01 - 09-01 @ 12:45  --------------------------------------------------------  IN: 323.3 mL / OUT: 0 mL / NET: 323.3 mL      CAPILLARY BLOOD GLUCOSE      POCT Blood Glucose.: 163 mg/dL (01 Sep 2020 12:40)      PHYSICAL EXAM:  General: in bed, NAD  HEENT: sclera clear, trachea midline  Lymph Nodes: non palp  Neck: supple  Respiratory: on RA, bilaterally equal BS   Cardiovascular: S1S2 RRR,   Abdomen: obese, soft, non distended, +bs present  Extremities: edema - non pitting x 4  Skin: warm, dry  Neurological: +PERRL, speech is clear, following all commands appropriately, bilaterally equal strength and mobility x 4      MEDICATIONS:  MEDICATIONS  (STANDING):  allopurinol 100 milliGRAM(s) Oral every 12 hours  aspirin  chewable 81 milliGRAM(s) Oral daily  atorvastatin 80 milliGRAM(s) Oral at bedtime  brimonidine 0.2% Ophthalmic Solution 1 Drop(s) Both EYES two times a day  cefepime   IVPB 2000 milliGRAM(s) IV Intermittent every 8 hours  chlorhexidine 4% Liquid 1 Application(s) Topical <User Schedule>  dextrose 5%. 1000 milliLiter(s) (50 mL/Hr) IV Continuous <Continuous>  dextrose 50% Injectable 12.5 Gram(s) IV Push once  dextrose 50% Injectable 25 Gram(s) IV Push once  dextrose 50% Injectable 25 Gram(s) IV Push once  enoxaparin Injectable 40 milliGRAM(s) SubCutaneous every 12 hours  insulin glargine Injectable (LANTUS) 45 Unit(s) SubCutaneous at bedtime  insulin lispro (HumaLOG) corrective regimen sliding scale   SubCutaneous Before meals and at bedtime  insulin lispro Injectable (HumaLOG) 16 Unit(s) SubCutaneous three times a day before meals  latanoprost 0.005% Ophthalmic Solution 1 Drop(s) Both EYES at bedtime  levETIRAcetam  IVPB 1000 milliGRAM(s) IV Intermittent every 12 hours  lidocaine   Patch 1 Patch Transdermal daily  lisinopril 40 milliGRAM(s) Oral daily  niCARdipine 40 milliGRAM(s) Oral three times a day  pantoprazole    Tablet 40 milliGRAM(s) Oral daily  polyethylene glycol 3350 17 Gram(s) Oral daily    MEDICATIONS  (PRN):  acetaminophen   Tablet .. 650 milliGRAM(s) Oral every 6 hours PRN Temp greater or equal to 38C (100.4F), Mild Pain (1 - 3)  dextrose 40% Gel 15 Gram(s) Oral once PRN Blood Glucose LESS THAN 70 milliGRAM(s)/deciLiter  glucagon  Injectable 1 milliGRAM(s) IntraMuscular once PRN Glucose <70 milliGRAM(s)/deciLiter  labetalol Injectable 10 milliGRAM(s) IV Push every 6 hours PRN BP > 180  melatonin 3 milliGRAM(s) Oral at bedtime PRN Insomnia      ALLERGIES:  Allergies    No Known Allergies    Intolerances        LABS:                        10.2   8.95  )-----------( 145      ( 01 Sep 2020 00:21 )             31.8     09-01    139  |  102  |  20  ----------------------------<  200<H>  3.1<L>   |  25  |  1.10    Ca    7.9<L>      01 Sep 2020 00:22  Phos  2.3     09-01  Mg     1.8     09-01      PT/INR - ( 01 Sep 2020 00:21 )   PT: 12.4 sec;   INR: 1.04 ratio         PTT - ( 01 Sep 2020 00:21 )  PTT:33.6 sec      RADIOLOGY & ADDITIONAL TESTS: Reviewed.

## 2020-09-01 NOTE — PROGRESS NOTE ADULT - ASSESSMENT
71 y/o F with none known DM history. H/o HTN, Gout, obesity (BMI> 48.1), presenting w/ b/l ankle swelling, fatigue/sleepiness and generalized weakness  x several days. Found to have undiagnosed DM and on DKA requiring MICU admission for insulin drip> + seizure while in MICU. Now off insulin drip, tolerating POs.  BG levels improved today  (100s) while on present insulin doses. Received 50% of the Humalog dose for breakfast with f/u BG in 160s> Received full dose of humalog for lunch. Will reassess BG this pm after pt received full premeal dose before making further insulin dose adjustments. BG goal 100 to 180s.  Pt has some knowledge about DM because of her mother.  Met with patient and reviewed the following:  -T2DM with DKA> Most likely diagnosis  -A1c LEVEL: Present and goal  -Blood glucose goals: 100s to 150s as out pt  -Glucose monitoring frequency: ac and hs  -Healthy eating and portion control. Started with recognizing food that contains carbs and importance of portion control. Needs RD consult  -Insulin(s) action, time of administration and side effects. Basal/bolus  -Importance of follow up care. Can follow at endo practice if pt wishes  -Needs to learn glucose monitoring and insulin prep with insulin pens as well as insulin injection.    Pt able to verbalize understanding regarding the present need for insulin therapy and the need to participate in self DM care. Spoke to RN about educational needs.     Spent over 35 minutes providing face to face education which was more than 50% of encounter that also included assessing pt/labs/meds and discussing plan of care with primary team. (high risk patient with DKA with seizure with high level decision-making). DKA resolved. Now extubated, off insulin drip, transitioned to PO diet with improved BG levels. Needs lots of education prior d/c due to new DM diagnosis

## 2020-09-01 NOTE — PROGRESS NOTE ADULT - PROBLEM SELECTOR PLAN 2
-goal less than 130/80.   -BP above goal. BP managed by MICU team.    Hyperlipidemia:  -C/w atorvastatin 80 milliGRAM(s) Oral at bedtime  -F/u Lipids as out pt
-goal less than 130/80. Continue lisinopril and nicardipine.

## 2020-09-02 ENCOUNTER — TRANSCRIPTION ENCOUNTER (OUTPATIENT)
Age: 70
End: 2020-09-02

## 2020-09-02 VITALS
RESPIRATION RATE: 21 BRPM | HEART RATE: 81 BPM | SYSTOLIC BLOOD PRESSURE: 140 MMHG | OXYGEN SATURATION: 72 % | DIASTOLIC BLOOD PRESSURE: 60 MMHG

## 2020-09-02 LAB
ANION GAP SERPL CALC-SCNC: 10 MMOL/L — SIGNIFICANT CHANGE UP (ref 5–17)
ANION GAP SERPL CALC-SCNC: 8 MMOL/L — SIGNIFICANT CHANGE UP (ref 5–17)
APTT BLD: 23.4 SEC — LOW (ref 27.5–35.5)
BUN SERPL-MCNC: 17 MG/DL — SIGNIFICANT CHANGE UP (ref 7–23)
BUN SERPL-MCNC: 18 MG/DL — SIGNIFICANT CHANGE UP (ref 7–23)
CALCIUM SERPL-MCNC: 7.8 MG/DL — LOW (ref 8.4–10.5)
CALCIUM SERPL-MCNC: 8.2 MG/DL — LOW (ref 8.4–10.5)
CHLORIDE SERPL-SCNC: 101 MMOL/L — SIGNIFICANT CHANGE UP (ref 96–108)
CHLORIDE SERPL-SCNC: 105 MMOL/L — SIGNIFICANT CHANGE UP (ref 96–108)
CO2 SERPL-SCNC: 25 MMOL/L — SIGNIFICANT CHANGE UP (ref 22–31)
CO2 SERPL-SCNC: 27 MMOL/L — SIGNIFICANT CHANGE UP (ref 22–31)
CREAT SERPL-MCNC: 1.01 MG/DL — SIGNIFICANT CHANGE UP (ref 0.5–1.3)
CREAT SERPL-MCNC: 1.11 MG/DL — SIGNIFICANT CHANGE UP (ref 0.5–1.3)
GAD65 AB SER-MCNC: 0 NMOL/L — SIGNIFICANT CHANGE UP
GLUCOSE BLDC GLUCOMTR-MCNC: 144 MG/DL — HIGH (ref 70–99)
GLUCOSE BLDC GLUCOMTR-MCNC: 148 MG/DL — HIGH (ref 70–99)
GLUCOSE SERPL-MCNC: 102 MG/DL — HIGH (ref 70–99)
GLUCOSE SERPL-MCNC: 268 MG/DL — HIGH (ref 70–99)
HCT VFR BLD CALC: 31.5 % — LOW (ref 34.5–45)
HGB BLD-MCNC: 10.3 G/DL — LOW (ref 11.5–15.5)
INR BLD: 1.05 RATIO — SIGNIFICANT CHANGE UP (ref 0.88–1.16)
MAGNESIUM SERPL-MCNC: 1.9 MG/DL — SIGNIFICANT CHANGE UP (ref 1.6–2.6)
MCHC RBC-ENTMCNC: 30.7 PG — SIGNIFICANT CHANGE UP (ref 27–34)
MCHC RBC-ENTMCNC: 32.7 GM/DL — SIGNIFICANT CHANGE UP (ref 32–36)
MCV RBC AUTO: 94 FL — SIGNIFICANT CHANGE UP (ref 80–100)
NRBC # BLD: 0 /100 WBCS — SIGNIFICANT CHANGE UP (ref 0–0)
PHOSPHATE SERPL-MCNC: 3.5 MG/DL — SIGNIFICANT CHANGE UP (ref 2.5–4.5)
PLATELET # BLD AUTO: 111 K/UL — LOW (ref 150–400)
POTASSIUM SERPL-MCNC: 4.3 MMOL/L — SIGNIFICANT CHANGE UP (ref 3.5–5.3)
POTASSIUM SERPL-MCNC: 5.8 MMOL/L — HIGH (ref 3.5–5.3)
POTASSIUM SERPL-SCNC: 4.3 MMOL/L — SIGNIFICANT CHANGE UP (ref 3.5–5.3)
POTASSIUM SERPL-SCNC: 5.8 MMOL/L — HIGH (ref 3.5–5.3)
PROTHROM AB SERPL-ACNC: 12.5 SEC — SIGNIFICANT CHANGE UP (ref 10.6–13.6)
RBC # BLD: 3.35 M/UL — LOW (ref 3.8–5.2)
RBC # FLD: 14.2 % — SIGNIFICANT CHANGE UP (ref 10.3–14.5)
SODIUM SERPL-SCNC: 136 MMOL/L — SIGNIFICANT CHANGE UP (ref 135–145)
SODIUM SERPL-SCNC: 140 MMOL/L — SIGNIFICANT CHANGE UP (ref 135–145)
WBC # BLD: 8.29 K/UL — SIGNIFICANT CHANGE UP (ref 3.8–10.5)
WBC # FLD AUTO: 8.29 K/UL — SIGNIFICANT CHANGE UP (ref 3.8–10.5)

## 2020-09-02 PROCEDURE — 70450 CT HEAD/BRAIN W/O DYE: CPT

## 2020-09-02 PROCEDURE — 83930 ASSAY OF BLOOD OSMOLALITY: CPT

## 2020-09-02 PROCEDURE — 82947 ASSAY GLUCOSE BLOOD QUANT: CPT

## 2020-09-02 PROCEDURE — 83880 ASSAY OF NATRIURETIC PEPTIDE: CPT

## 2020-09-02 PROCEDURE — 82330 ASSAY OF CALCIUM: CPT

## 2020-09-02 PROCEDURE — 70496 CT ANGIOGRAPHY HEAD: CPT

## 2020-09-02 PROCEDURE — 93005 ELECTROCARDIOGRAM TRACING: CPT

## 2020-09-02 PROCEDURE — 97162 PT EVAL MOD COMPLEX 30 MIN: CPT

## 2020-09-02 PROCEDURE — 86769 SARS-COV-2 COVID-19 ANTIBODY: CPT

## 2020-09-02 PROCEDURE — 85610 PROTHROMBIN TIME: CPT

## 2020-09-02 PROCEDURE — 81001 URINALYSIS AUTO W/SCOPE: CPT

## 2020-09-02 PROCEDURE — 83735 ASSAY OF MAGNESIUM: CPT

## 2020-09-02 PROCEDURE — 87040 BLOOD CULTURE FOR BACTERIA: CPT

## 2020-09-02 PROCEDURE — 85014 HEMATOCRIT: CPT

## 2020-09-02 PROCEDURE — 97110 THERAPEUTIC EXERCISES: CPT

## 2020-09-02 PROCEDURE — 80048 BASIC METABOLIC PNL TOTAL CA: CPT

## 2020-09-02 PROCEDURE — 80053 COMPREHEN METABOLIC PANEL: CPT

## 2020-09-02 PROCEDURE — 99291 CRITICAL CARE FIRST HOUR: CPT

## 2020-09-02 PROCEDURE — 96374 THER/PROPH/DIAG INJ IV PUSH: CPT | Mod: XU

## 2020-09-02 PROCEDURE — 82565 ASSAY OF CREATININE: CPT

## 2020-09-02 PROCEDURE — 82435 ASSAY OF BLOOD CHLORIDE: CPT

## 2020-09-02 PROCEDURE — 95714 VEEG EA 12-26 HR UNMNTR: CPT

## 2020-09-02 PROCEDURE — 83036 HEMOGLOBIN GLYCOSYLATED A1C: CPT

## 2020-09-02 PROCEDURE — 86337 INSULIN ANTIBODIES: CPT

## 2020-09-02 PROCEDURE — 83605 ASSAY OF LACTIC ACID: CPT

## 2020-09-02 PROCEDURE — 82962 GLUCOSE BLOOD TEST: CPT

## 2020-09-02 PROCEDURE — 94002 VENT MGMT INPAT INIT DAY: CPT

## 2020-09-02 PROCEDURE — 99291 CRITICAL CARE FIRST HOUR: CPT | Mod: 25

## 2020-09-02 PROCEDURE — 86803 HEPATITIS C AB TEST: CPT

## 2020-09-02 PROCEDURE — 71045 X-RAY EXAM CHEST 1 VIEW: CPT

## 2020-09-02 PROCEDURE — 84295 ASSAY OF SERUM SODIUM: CPT

## 2020-09-02 PROCEDURE — 84132 ASSAY OF SERUM POTASSIUM: CPT

## 2020-09-02 PROCEDURE — 83615 LACTATE (LD) (LDH) ENZYME: CPT

## 2020-09-02 PROCEDURE — 93306 TTE W/DOPPLER COMPLETE: CPT

## 2020-09-02 PROCEDURE — 85018 HEMOGLOBIN: CPT

## 2020-09-02 PROCEDURE — 82803 BLOOD GASES ANY COMBINATION: CPT

## 2020-09-02 PROCEDURE — 96376 TX/PRO/DX INJ SAME DRUG ADON: CPT | Mod: XU

## 2020-09-02 PROCEDURE — 87070 CULTURE OTHR SPECIMN AEROBIC: CPT

## 2020-09-02 PROCEDURE — 84484 ASSAY OF TROPONIN QUANT: CPT

## 2020-09-02 PROCEDURE — 87186 SC STD MICRODIL/AGAR DIL: CPT

## 2020-09-02 PROCEDURE — 86341 ISLET CELL ANTIBODY: CPT

## 2020-09-02 PROCEDURE — 80061 LIPID PANEL: CPT

## 2020-09-02 PROCEDURE — 97530 THERAPEUTIC ACTIVITIES: CPT

## 2020-09-02 PROCEDURE — 85027 COMPLETE CBC AUTOMATED: CPT

## 2020-09-02 PROCEDURE — 97116 GAIT TRAINING THERAPY: CPT

## 2020-09-02 PROCEDURE — 85730 THROMBOPLASTIN TIME PARTIAL: CPT

## 2020-09-02 PROCEDURE — 70553 MRI BRAIN STEM W/O & W/DYE: CPT

## 2020-09-02 PROCEDURE — 70498 CT ANGIOGRAPHY NECK: CPT

## 2020-09-02 PROCEDURE — 84100 ASSAY OF PHOSPHORUS: CPT

## 2020-09-02 PROCEDURE — 82010 KETONE BODYS QUAN: CPT

## 2020-09-02 PROCEDURE — 93010 ELECTROCARDIOGRAM REPORT: CPT

## 2020-09-02 PROCEDURE — 96375 TX/PRO/DX INJ NEW DRUG ADDON: CPT | Mod: XU

## 2020-09-02 RX ORDER — ATORVASTATIN CALCIUM 80 MG/1
1 TABLET, FILM COATED ORAL
Qty: 30 | Refills: 0
Start: 2020-09-02 | End: 2020-10-01

## 2020-09-02 RX ORDER — PANTOPRAZOLE SODIUM 20 MG/1
1 TABLET, DELAYED RELEASE ORAL
Qty: 30 | Refills: 0
Start: 2020-09-02 | End: 2020-10-01

## 2020-09-02 RX ORDER — ENOXAPARIN SODIUM 100 MG/ML
45 INJECTION SUBCUTANEOUS
Qty: 1 | Refills: 0
Start: 2020-09-02 | End: 2020-10-01

## 2020-09-02 RX ORDER — LISINOPRIL 2.5 MG/1
1 TABLET ORAL
Qty: 30 | Refills: 0
Start: 2020-09-02 | End: 2020-10-01

## 2020-09-02 RX ORDER — ASPIRIN/CALCIUM CARB/MAGNESIUM 324 MG
1 TABLET ORAL
Qty: 30 | Refills: 0
Start: 2020-09-02 | End: 2020-10-01

## 2020-09-02 RX ORDER — GABAPENTIN 400 MG/1
1 CAPSULE ORAL
Qty: 30 | Refills: 0
Start: 2020-09-02 | End: 2020-10-01

## 2020-09-02 RX ORDER — LEVETIRACETAM 250 MG/1
1000 TABLET, FILM COATED ORAL
Refills: 0 | Status: DISCONTINUED | OUTPATIENT
Start: 2020-09-02 | End: 2020-09-02

## 2020-09-02 RX ORDER — INFLUENZA VIRUS VACCINE 15; 15; 15; 15 UG/.5ML; UG/.5ML; UG/.5ML; UG/.5ML
0.5 SUSPENSION INTRAMUSCULAR ONCE
Refills: 0 | Status: DISCONTINUED | OUTPATIENT
Start: 2020-09-02 | End: 2020-09-02

## 2020-09-02 RX ORDER — LEVETIRACETAM 250 MG/1
1 TABLET, FILM COATED ORAL
Qty: 60 | Refills: 0
Start: 2020-09-02 | End: 2020-10-01

## 2020-09-02 RX ORDER — NICARDIPINE HYDROCHLORIDE 30 MG/1
2 CAPSULE, EXTENDED RELEASE ORAL
Qty: 180 | Refills: 0
Start: 2020-09-02 | End: 2020-10-01

## 2020-09-02 RX ORDER — INSULIN LISPRO 100/ML
16 VIAL (ML) SUBCUTANEOUS
Qty: 1 | Refills: 0
Start: 2020-09-02 | End: 2020-10-01

## 2020-09-02 RX ORDER — ISOPROPYL ALCOHOL, BENZOCAINE .7; .06 ML/ML; ML/ML
1 SWAB TOPICAL
Qty: 30 | Refills: 1
Start: 2020-09-02 | End: 2020-10-31

## 2020-09-02 RX ADMIN — Medication 16 UNIT(S): at 13:12

## 2020-09-02 RX ADMIN — LISINOPRIL 40 MILLIGRAM(S): 2.5 TABLET ORAL at 05:12

## 2020-09-02 RX ADMIN — LIDOCAINE 1 PATCH: 4 CREAM TOPICAL at 00:00

## 2020-09-02 RX ADMIN — Medication 650 MILLIGRAM(S): at 07:14

## 2020-09-02 RX ADMIN — Medication 100 MILLIGRAM(S): at 05:12

## 2020-09-02 RX ADMIN — BRIMONIDINE TARTRATE 1 DROP(S): 2 SOLUTION/ DROPS OPHTHALMIC at 05:13

## 2020-09-02 RX ADMIN — ENOXAPARIN SODIUM 40 MILLIGRAM(S): 100 INJECTION SUBCUTANEOUS at 05:13

## 2020-09-02 RX ADMIN — LEVETIRACETAM 400 MILLIGRAM(S): 250 TABLET, FILM COATED ORAL at 05:13

## 2020-09-02 RX ADMIN — CEFEPIME 100 MILLIGRAM(S): 1 INJECTION, POWDER, FOR SOLUTION INTRAMUSCULAR; INTRAVENOUS at 05:14

## 2020-09-02 RX ADMIN — CHLORHEXIDINE GLUCONATE 1 APPLICATION(S): 213 SOLUTION TOPICAL at 05:13

## 2020-09-02 RX ADMIN — CEFEPIME 100 MILLIGRAM(S): 1 INJECTION, POWDER, FOR SOLUTION INTRAMUSCULAR; INTRAVENOUS at 13:17

## 2020-09-02 RX ADMIN — PANTOPRAZOLE SODIUM 40 MILLIGRAM(S): 20 TABLET, DELAYED RELEASE ORAL at 12:45

## 2020-09-02 RX ADMIN — NICARDIPINE HYDROCHLORIDE 40 MILLIGRAM(S): 30 CAPSULE, EXTENDED RELEASE ORAL at 05:12

## 2020-09-02 RX ADMIN — POLYETHYLENE GLYCOL 3350 17 GRAM(S): 17 POWDER, FOR SOLUTION ORAL at 12:45

## 2020-09-02 RX ADMIN — Medication 16 UNIT(S): at 08:31

## 2020-09-02 RX ADMIN — LIDOCAINE 1 PATCH: 4 CREAM TOPICAL at 12:45

## 2020-09-02 RX ADMIN — GABAPENTIN 100 MILLIGRAM(S): 400 CAPSULE ORAL at 12:46

## 2020-09-02 RX ADMIN — Medication 81 MILLIGRAM(S): at 12:45

## 2020-09-02 RX ADMIN — NICARDIPINE HYDROCHLORIDE 40 MILLIGRAM(S): 30 CAPSULE, EXTENDED RELEASE ORAL at 12:45

## 2020-09-02 RX ADMIN — Medication 650 MILLIGRAM(S): at 08:09

## 2020-09-02 NOTE — DISCHARGE NOTE NURSING/CASE MANAGEMENT/SOCIAL WORK - NSDCPNDISPN_GEN_ALL_CORE
Opioids not applicable/not prescribed/Activities of daily living, including home environment that might     exacerbate pain or reduce effectiveness of the pain management plan of care as well as strategies to address these issues/Education provided on the pain management plan of care

## 2020-09-02 NOTE — CHART NOTE - NSCHARTNOTEFT_GEN_A_CORE
Saw pt this am in MICU. Pt feeling well and per primary team going home. However. Pt reports she didn't learn how to do finger sticks or insulin prep/administration with insulin pen.  Spoke to team/RN about teaching and pt need to give teach back to be able to go home.  BG at goal so c/w L45 qhs and  Humalog 16 ac meals at home   Follow up apt at [address] [facility] [address] [facility] [address]. Phone number: [phone]. Please make apt.  Per  pt will get home care to reinforce DM teaching since she has new DM diagnoses

## 2020-09-02 NOTE — DISCHARGE NOTE NURSING/CASE MANAGEMENT/SOCIAL WORK - PATIENT PORTAL LINK FT
You can access the FollowMyHealth Patient Portal offered by [facility] by registering at the following website: [email or web address]. By joining [facility]’s FollowMyHealth portal, you will also be able to view your health information using other applications (apps) compatible with our system.

## 2020-09-02 NOTE — DISCHARGE NOTE PROVIDER - NSDCMRMEDTOKEN_GEN_ALL_CORE_FT
alcohol swabs : Apply topically to affected area 4 times a day   allopurinol 100 mg oral tablet: 1 tab(s) orally 2 times a day  Alphagan: 1 drop(s) to each affected eye 2 times a day  cyclobenzaprine 10 mg oral tablet: 1 tab(s) orally 3 times a day  glucometer (per patient&#x27;s insurance): Test blood sugars four times a day. Dispense #1 glucometer.  HumaLOG KwikPen 100 units/mL injectable solution: 16 unit(s) injectable 3 times a day (before meals)   hydrOXYzine hydrochloride 10 mg oral tablet: 2 tab(s) orally once a day, As Needed  Insulin Pen Needles, 4mm: 1 application subcutaneously 4 times a day. ** Use with insulin pen **   lancets: 1 application subcutaneously 4 times a day   Lantus Solostar Pen 100 units/mL subcutaneous solution: 45 unit(s) subcutaneous once a day (at bedtime)   lisinopril 40 mg oral tablet: 1 tab(s) orally once a day (at bedtime)  test strips (per patient&#x27;s insurance): 1 application subcutaneously 4 times a day. ** Compatible with patient&#x27;s glucometer ** alcohol swabs : Apply topically to affected area 4 times a day   allopurinol 100 mg oral tablet: 1 tab(s) orally 2 times a day  Alphagan: 1 drop(s) to each affected eye 2 times a day  aspirin 81 mg oral tablet, chewable: 1 tab(s) orally once a day  atorvastatin 80 mg oral tablet: 1 tab(s) orally once a day (at bedtime)  cyclobenzaprine 10 mg oral tablet: 1 tab(s) orally 3 times a day  glucometer (per patient&#x27;s insurance): Test blood sugars four times a day. Dispense #1 glucometer.  HumaLOG KwikPen 100 units/mL injectable solution: 16 unit(s) injectable 3 times a day (before meals)   hydrOXYzine hydrochloride 10 mg oral tablet: 2 tab(s) orally once a day, As Needed  Insulin Pen Needles, 4mm: 1 application subcutaneously 4 times a day. ** Use with insulin pen **   lancets: 1 application subcutaneously 4 times a day   Lantus Solostar Pen 100 units/mL subcutaneous solution: 45 unit(s) subcutaneous once a day (at bedtime)   lisinopril 40 mg oral tablet: 1 tab(s) orally once a day (at bedtime)  Neurontin 100 mg oral capsule: 1 cap(s) orally once a day  niCARdipine 20 mg oral capsule: 2 cap(s) orally 3 times a day  test strips (per patient&#x27;s insurance): 1 application subcutaneously 4 times a day. ** Compatible with patient&#x27;s glucometer ** alcohol swabs : Apply topically to affected area 4 times a day   allopurinol 100 mg oral tablet: 1 tab(s) orally 2 times a day  Alphagan: 1 drop(s) to each affected eye 2 times a day  aspirin 81 mg oral tablet, chewable: 1 tab(s) orally once a day  atorvastatin 80 mg oral tablet: 1 tab(s) orally once a day (at bedtime)  cyclobenzaprine 10 mg oral tablet: 1 tab(s) orally 3 times a day  glucometer (per patient&#x27;s insurance): Test blood sugars four times a day. Dispense #1 glucometer.  HumaLOG KwikPen 100 units/mL injectable solution: 16 unit(s) injectable 3 times a day (before meals)   hydrOXYzine hydrochloride 10 mg oral tablet: 2 tab(s) orally once a day, As Needed  Insulin Pen Needles, 4mm: 1 application subcutaneously 4 times a day. ** Use with insulin pen **   lancets: 1 application subcutaneously 4 times a day   Lantus Solostar Pen 100 units/mL subcutaneous solution: 45 unit(s) subcutaneous once a day (at bedtime)   levETIRAcetam 1000 mg oral tablet: 1 tab(s) orally 2 times a day  lisinopril 40 mg oral tablet: 1 tab(s) orally once a day (at bedtime)  Neurontin 100 mg oral capsule: 1 cap(s) orally once a day  niCARdipine 20 mg oral capsule: 2 cap(s) orally 3 times a day  pantoprazole 40 mg oral delayed release tablet: 1 tab(s) orally once a day  test strips (per patient&#x27;s insurance): 1 application subcutaneously 4 times a day. ** Compatible with patient&#x27;s glucometer **

## 2020-09-02 NOTE — CHART NOTE - NSCHARTNOTEFT_GEN_A_CORE
Nutrition Follow Up Note   Patient seen for: nutrition follow up on  ICU     Source: medical record, pt, communication with team.     Chart reviewed, events noted. Adm for [facility], hypertensive emergency. New DM dx    Diet Order: Diet, Soft:   Consistent Carbohydrate {Evening Snack} (CSTCHOSN) (08-30-20 @ 07:35)    Nutrition Events: pt reports good appetite. Stated that she had been starting to cut back on carbohydrates PTA, doesn't have a typical meal pattern, eats last meal at 2pm. Type 2 DM Nutrition Therapy handout provided and reviewed, explained importance of regular meal timing especially w/ insulin, portion control, and benefits of wt loss for DM and overall health, Weight Loss Nutrition Therapy handout also provided.. Pt stated that she is very motivated to make necessary changes to help control DM. Encouraged pt to f/u w/ an endocrinologist and ongoing nutrition education.    GI: denies N/V    Anthropometric Measurements:   Height (cm): 162.56 (08-28-20 @ 08:07)  Weight (kg): 127 (08-25-20 @ 21:41)  BMI (kg/m2): 48.1 (08-28-20 @ 08:07)      Medications: MEDICATIONS  (STANDING):  allopurinol 100 milliGRAM(s) Oral every 12 hours  aspirin  chewable 81 milliGRAM(s) Oral daily  atorvastatin 80 milliGRAM(s) Oral at bedtime  brimonidine 0.2% Ophthalmic Solution 1 Drop(s) Both EYES two times a day  cefepime   IVPB 2000 milliGRAM(s) IV Intermittent every 8 hours  chlorhexidine 4% Liquid 1 Application(s) Topical <User Schedule>  dextrose 5%. 1000 milliLiter(s) (50 mL/Hr) IV Continuous <Continuous>  dextrose 50% Injectable 12.5 Gram(s) IV Push once  dextrose 50% Injectable 25 Gram(s) IV Push once  dextrose 50% Injectable 25 Gram(s) IV Push once  enoxaparin Injectable 40 milliGRAM(s) SubCutaneous every 12 hours  gabapentin 100 milliGRAM(s) Oral daily  insulin glargine Injectable (LANTUS) 45 Unit(s) SubCutaneous at bedtime  insulin lispro (HumaLOG) corrective regimen sliding scale   SubCutaneous Before meals and at bedtime  insulin lispro Injectable (HumaLOG) 16 Unit(s) SubCutaneous three times a day before meals  latanoprost 0.005% Ophthalmic Solution 1 Drop(s) Both EYES at bedtime  levETIRAcetam  IVPB 1000 milliGRAM(s) IV Intermittent every 12 hours  lidocaine   Patch 1 Patch Transdermal daily  lisinopril 40 milliGRAM(s) Oral daily  niCARdipine 40 milliGRAM(s) Oral three times a day  pantoprazole    Tablet 40 milliGRAM(s) Oral daily  polyethylene glycol 3350 17 Gram(s) Oral daily    MEDICATIONS  (PRN):  acetaminophen   Tablet .. 650 milliGRAM(s) Oral every 6 hours PRN Temp greater or equal to 38C (100.4F), Mild Pain (1 - 3)  dextrose 40% Gel 15 Gram(s) Oral once PRN Blood Glucose LESS THAN 70 milliGRAM(s)/deciLiter  glucagon  Injectable 1 milliGRAM(s) IntraMuscular once PRN Glucose <70 milliGRAM(s)/deciLiter  labetalol Injectable 10 milliGRAM(s) IV Push every 6 hours PRN BP > 180  melatonin 3 milliGRAM(s) Oral at bedtime PRN Insomnia    Labs: 09-02 @ 01:54: Sodium 140, Potassium 5.8<H>, Calcium 7.8<L>, Magnesium 1.9, Phosphorus 3.5, BUN 17, Creatinine 1.01, Glucose 102<H>, Alk Phos --, ALT/SGPT --, AST/SGOT --, Albumin --, Prealbumin --, Total Bilirubin --, Hemoglobin 10.3<L>, Hematocrit 31.5<L>, Ferritin --, C-Reactive Protein --, Creatine Kinase <<27>      Triglycerides, Serum: 105 mg/dL (08-28-20 @ 03:49)    POCT Blood Glucose.: 144 mg/dL (09-02-20 @ 08:17)  POCT Blood Glucose.: 125 mg/dL (09-01-20 @ 21:57)  POCT Blood Glucose.: 145 mg/dL (09-01-20 @ 17:42)  POCT Blood Glucose.: 163 mg/dL (09-01-20 @ 12:40)    Skin: no pressure injuries documented   Edema: 1+ dependent    Estimated Needs:   Energy: 1774 kcals  Protein:   gm protein    Previous Nutrition Diagnoses: overweight/obesity and Food & Nutrition Related Knowledge Deficit  Nutrition Diagnoses are: ongoing, being addressed w/ nutrition education     New Nutrition Diagnosis: none    Recommended Interventions:   1. Continue Consistent Carbohydrate diet.   2. Continue education.      Monitoring and Evaluation:   Continue to monitor nutrition provision and tolerance, weights, labs, skin integrity.   RD remains available upon request and will follow up per protocol.

## 2020-09-02 NOTE — PROGRESS NOTE ADULT - SUBJECTIVE AND OBJECTIVE BOX
MICU PROGRESS NOTE  Lazara Andrew, PGY1 Green Team Intern     INTERVAL HPI/OVERNIGHT EVENTS:  No Overnight Events.     SUBJECTIVE:   Patient seen and examined at bedside.     REVIEW OF SYMPTOMS:  unable to obtain due to patient mentation     OBJECTIVE:    VITAL SIGNS:  ICU Vital Signs Last 24 Hrs  T(C): 36.9 (02 Sep 2020 04:00), Max: 36.9 (01 Sep 2020 20:00)  T(F): 98.5 (02 Sep 2020 04:00), Max: 98.5 (02 Sep 2020 04:00)  HR: 80 (02 Sep 2020 07:00) (60 - 105)  BP: 144/87 (02 Sep 2020 07:00) (118/66 - 179/76)  BP(mean): 107 (02 Sep 2020 07:00) (86 - 121)  ABP: --  ABP(mean): --  RR: 13 (02 Sep 2020 07:00) (10 - 44)  SpO2: 93% (02 Sep 2020 07:00) (91% - 99%)        09-01 @ 07:01  -  09-02 @ 07:00  --------------------------------------------------------  IN: 1073.3 mL / OUT: 600 mL / NET: 473.3 mL      CAPILLARY BLOOD GLUCOSE      POCT Blood Glucose.: 125 mg/dL (01 Sep 2020 21:57)      PHYSICAL EXAM:  General: NAD  HEENT: NC/AT; PERRL, clear conjunctiva  Neck: supple  Respiratory: CTA b/l  Cardiovascular: +S1/S2; RRR  Abdomen: soft, NT/ND; +BS x4  Extremities: WWP, 2+ peripheral pulses b/l; no LE edema  Skin: normal color and turgor; no rash  Neurological:    MEDICATIONS:  MEDICATIONS  (STANDING):  allopurinol 100 milliGRAM(s) Oral every 12 hours  aspirin  chewable 81 milliGRAM(s) Oral daily  atorvastatin 80 milliGRAM(s) Oral at bedtime  brimonidine 0.2% Ophthalmic Solution 1 Drop(s) Both EYES two times a day  cefepime   IVPB 2000 milliGRAM(s) IV Intermittent every 8 hours  chlorhexidine 4% Liquid 1 Application(s) Topical <User Schedule>  dextrose 5%. 1000 milliLiter(s) (50 mL/Hr) IV Continuous <Continuous>  dextrose 50% Injectable 12.5 Gram(s) IV Push once  dextrose 50% Injectable 25 Gram(s) IV Push once  dextrose 50% Injectable 25 Gram(s) IV Push once  enoxaparin Injectable 40 milliGRAM(s) SubCutaneous every 12 hours  gabapentin 100 milliGRAM(s) Oral daily  insulin glargine Injectable (LANTUS) 45 Unit(s) SubCutaneous at bedtime  insulin lispro (HumaLOG) corrective regimen sliding scale   SubCutaneous Before meals and at bedtime  insulin lispro Injectable (HumaLOG) 16 Unit(s) SubCutaneous three times a day before meals  latanoprost 0.005% Ophthalmic Solution 1 Drop(s) Both EYES at bedtime  levETIRAcetam  IVPB 1000 milliGRAM(s) IV Intermittent every 12 hours  lidocaine   Patch 1 Patch Transdermal daily  lisinopril 40 milliGRAM(s) Oral daily  niCARdipine 40 milliGRAM(s) Oral three times a day  pantoprazole    Tablet 40 milliGRAM(s) Oral daily  polyethylene glycol 3350 17 Gram(s) Oral daily    MEDICATIONS  (PRN):  acetaminophen   Tablet .. 650 milliGRAM(s) Oral every 6 hours PRN Temp greater or equal to 38C (100.4F), Mild Pain (1 - 3)  dextrose 40% Gel 15 Gram(s) Oral once PRN Blood Glucose LESS THAN 70 milliGRAM(s)/deciLiter  glucagon  Injectable 1 milliGRAM(s) IntraMuscular once PRN Glucose <70 milliGRAM(s)/deciLiter  labetalol Injectable 10 milliGRAM(s) IV Push every 6 hours PRN BP > 180  melatonin 3 milliGRAM(s) Oral at bedtime PRN Insomnia      ALLERGIES:  Allergies    No Known Allergies    Intolerances        LABS:                        10.3   8.29  )-----------( 111      ( 02 Sep 2020 01:54 )             31.5     09-02    140  |  105  |  17  ----------------------------<  102<H>  5.8<H>   |  27  |  1.01    Ca    7.8<L>      02 Sep 2020 01:54  Phos  3.5     09-02  Mg     1.9     09-02      PT/INR - ( 02 Sep 2020 01:54 )   PT: 12.5 sec;   INR: 1.05 ratio         PTT - ( 02 Sep 2020 01:54 )  PTT:23.4 sec      RADIOLOGY & ADDITIONAL TESTS: Reviewed. MICU PROGRESS NOTE  Lazara Andrew, PGY1 Green Team Intern     INTERVAL HPI/OVERNIGHT EVENTS:  No Overnight Events. KPhos for Phos repletion. MRI completed, pending official read.  Chronic hip / lower back pain with some radiation into legs, started gabapentin.    SUBJECTIVE:   Patient seen and examined at bedside. No new complaints. Pt stable on bedboard. States lower back/hip pain improved with medication regimen.   Denies N/V, sob, cp, changes in bowel/ bladder, dizziness.    REVIEW OF SYMPTOMS:  as above    OBJECTIVE:    VITAL SIGNS:  ICU Vital Signs Last 24 Hrs  T(C): 36.9 (02 Sep 2020 04:00), Max: 36.9 (01 Sep 2020 20:00)  T(F): 98.5 (02 Sep 2020 04:00), Max: 98.5 (02 Sep 2020 04:00)  HR: 80 (02 Sep 2020 07:00) (60 - 105)  BP: 144/87 (02 Sep 2020 07:00) (118/66 - 179/76)  BP(mean): 107 (02 Sep 2020 07:00) (86 - 121)  ABP: --  ABP(mean): --  RR: 13 (02 Sep 2020 07:00) (10 - 44)  SpO2: 93% (02 Sep 2020 07:00) (91% - 99%)        09-01 @ 07:01  -  09-02 @ 07:00  --------------------------------------------------------  IN: 1073.3 mL / OUT: 600 mL / NET: 473.3 mL      CAPILLARY BLOOD GLUCOSE      POCT Blood Glucose.: 125 mg/dL (01 Sep 2020 21:57)      PHYSICAL EXAM:  General: sitting up in chair eating, NAD  HEENT: sclera clear, trachea midline  Lymph Nodes: non palp  Neck: supple  Respiratory: on RA, bilaterally equal BS   Cardiovascular: S1S2 RRR,   Abdomen: obese, soft, non distended, +bs present  Extremities: edema - non pitting LE  Skin: warm, dry  Neurological: +PERRL, speech is clear, following all commands appropriately, no focal deficits appreciated    MEDICATIONS:  MEDICATIONS  (STANDING):  allopurinol 100 milliGRAM(s) Oral every 12 hours  aspirin  chewable 81 milliGRAM(s) Oral daily  atorvastatin 80 milliGRAM(s) Oral at bedtime  brimonidine 0.2% Ophthalmic Solution 1 Drop(s) Both EYES two times a day  cefepime   IVPB 2000 milliGRAM(s) IV Intermittent every 8 hours  chlorhexidine 4% Liquid 1 Application(s) Topical <User Schedule>  dextrose 5%. 1000 milliLiter(s) (50 mL/Hr) IV Continuous <Continuous>  dextrose 50% Injectable 12.5 Gram(s) IV Push once  dextrose 50% Injectable 25 Gram(s) IV Push once  dextrose 50% Injectable 25 Gram(s) IV Push once  enoxaparin Injectable 40 milliGRAM(s) SubCutaneous every 12 hours  gabapentin 100 milliGRAM(s) Oral daily  insulin glargine Injectable (LANTUS) 45 Unit(s) SubCutaneous at bedtime  insulin lispro (HumaLOG) corrective regimen sliding scale   SubCutaneous Before meals and at bedtime  insulin lispro Injectable (HumaLOG) 16 Unit(s) SubCutaneous three times a day before meals  latanoprost 0.005% Ophthalmic Solution 1 Drop(s) Both EYES at bedtime  levETIRAcetam  IVPB 1000 milliGRAM(s) IV Intermittent every 12 hours  lidocaine   Patch 1 Patch Transdermal daily  lisinopril 40 milliGRAM(s) Oral daily  niCARdipine 40 milliGRAM(s) Oral three times a day  pantoprazole    Tablet 40 milliGRAM(s) Oral daily  polyethylene glycol 3350 17 Gram(s) Oral daily    MEDICATIONS  (PRN):  acetaminophen   Tablet .. 650 milliGRAM(s) Oral every 6 hours PRN Temp greater or equal to 38C (100.4F), Mild Pain (1 - 3)  dextrose 40% Gel 15 Gram(s) Oral once PRN Blood Glucose LESS THAN 70 milliGRAM(s)/deciLiter  glucagon  Injectable 1 milliGRAM(s) IntraMuscular once PRN Glucose <70 milliGRAM(s)/deciLiter  labetalol Injectable 10 milliGRAM(s) IV Push every 6 hours PRN BP > 180  melatonin 3 milliGRAM(s) Oral at bedtime PRN Insomnia      ALLERGIES:  Allergies    No Known Allergies    Intolerances        LABS:                        10.3   8.29  )-----------( 111      ( 02 Sep 2020 01:54 )             31.5     09-02    140  |  105  |  17  ----------------------------<  102<H>  5.8<H>   |  27  |  1.01    Ca    7.8<L>      02 Sep 2020 01:54  Phos  3.5     09-02  Mg     1.9     09-02      PT/INR - ( 02 Sep 2020 01:54 )   PT: 12.5 sec;   INR: 1.05 ratio         PTT - ( 02 Sep 2020 01:54 )  PTT:23.4 sec      RADIOLOGY & ADDITIONAL TESTS: Reviewed.

## 2020-09-02 NOTE — PROGRESS NOTE ADULT - ASSESSMENT
69yo F admitted wit HHS, hypertensive urgency c/b generalized seizure/AMS most likely 2/2 rapid correction of blood surgar and osmolar shit requiring intubation 08/26, s/p 8/27    #Neuro: Generalized seizure/Encephalopathy 2/2 hyperglycemic / hyperosmolar shift now new loculated density in the LIAC concerning for meningioma awaiting MRI brain  - no h/o seizure reported- no further evidence of seizure activity on EEG's  - per neuro c/w Keppra 1gm/q12H- if no further seizures and no evidence of meningeoma pos d/c   -c/w ASA and Statin  -c/w neuro checks as per protocol  - f/u MRI head [5pm today]    #Pulm: Acute hypoxemic Respiratory Failure s/p intubation for airway protection 2/2 AMS / encephalopathy /generalized seizure activity s/p extubation 8/27- doing well on RA  -c/w RA- O2 sats % over last 24hrs  - NC prn if O2 sats <90%  -aspiration precautions  -Incentive spirometry  -Chest PT    #CV: A/W Hypertensive urgency () s/p total Labetalol 40 IV on admission s/p cardene gtt [08/29] bridged to nicardipine PO  - c/w Lisinopril 40 qd, Nicardipine 40 tid  - c/w Labetalol PRN- 10mg IVP w paratmeters  - monitor vitals     #GI/: No active issues  - s/p NPO- keofeed in place- tolerated TF- passed BS swallow- now on PO soft diet / diabetic  - c/w bowel regimen  - c/w PPI daily    #ID: Afebrile, no leukocytosis at presentation had been initially empirically treated with ceftriaxone for suspicion of meningitis then at a lower dose of aspiration pna now d/c'd    - sputum + pseudomonas A.   - c/w Cefepime: day #4/5     #FEN/ENDO: Hyperglycemic Hyperosmolar State vs DKA / HHNK s/p insulin gtt transitioned to HISS and lantus- now resolved  - new dx of DM on this admission  - HbA1c 9.0  - increased basal -> Lantus 45U qhs [08/31-], low ss correction at bedtime  - increased pre-meal -> Humalog 16U tid [08/31-], seperate low ss correction for pre-meals  - 08/29 11:30pm lantus 34 ---> 08/30: 5am [fs 227] 4U ss, 12pm [fs 335] 8U pre-meal + 8U ss, 5pm [fs 245] 14U premeal + 4U ss, 9pm [fs 189] 38U lantus + 2U ss  - endocrine on board  - monitor POCT glu    #HEME:  No active issues  - DVT ppx with Lovenox 40q12     #MSK  chronic hip pain   - lidocaine patch qd, tyenol prn  - PT Consult 71yo F admitted wit HHS, hypertensive urgency c/b generalized seizure?/AMS most likely 2/2 rapid correction of blood sugar and osmolar shit requiring intubation x1day s/p 8/27 now PO diet with basal bolus insulin. New DM dx this admission. Started on Keppra out of concern for seizure, work up found possible meningoma on CT. s/p MRI pending read.   Bedboarded Day 3. Stable for discharge from primary team standpoint pending endo and neuro recs.    #Neuro:   //Generalized seizure/Encephalopathy 2/2 hyperglycemic / hyperosmolar shift now new loculated density in the LIAC concerning for meningioma awaiting MRI brain  - no h/o seizure reported- no further evidence of seizure activity on EEG's  - per neuro c/w Keppra 1gm/q12H- if no further seizures and no evidence of meningeoma pos d/c --> f/u MRI read and call Neuro for D/C recs  -c/w ASA and Statin  -c/w neuro checks as per protocol    //Neuropathic pain 2/2 chronic lumbar stenosis   - started gabapentin 100 po    #Pulm:   //Acute hypoxemic Respiratory Failure s/p intubation for airway protection 2/2 AMS / encephalopathy /generalized seizure? activity   - s/p extubation 8/27  - c/w RA- O2 satting well  - aspiration precautions  - Incentive spirometry, Chest PT    #CV: A/W Hypertensive urgency () s/p total Labetalol 40 IV on admission s/p cardene gtt [08/29] bridged to nicardipine PO  - c/w Lisinopril 40 qd, Nicardipine 40 tid  - c/w Labetalol PRN- 10mg IVP w paratmeters  - monitor vitals     #GI/: No active issues  - s/p keofeed, TF  - passed BS swallow  - PO soft diet / diabetic  - c/w bowel regimen  - c/w PPI daily    #ID: Afebrile, no leukocytosis at presentation had been initially empirically treated with ceftriaxone for suspicion of meningitis then at a lower dose of aspiration pna now d/c'd    - sputum + pseudomonas A.   - c/w Cefepime: day #5/5     #FEN/ENDO: Hyperglycemic Hyperosmolar State vs DKA / HHNK s/p insulin gtt transitioned to HISS and lantus- now resolved  - new dx of DM on this admission  - HbA1c 9.0  - increased basal -> Lantus 45U qhs [08/31-], low ss correction at bedtime  - increased pre-meal -> Humalog 16U tid [08/31-], seperate low ss correction for pre-meals  - endocrine on board --> f/u recs for d/c  - home diabetic education, new DM patient this admission  - monitor POCT glu, 140 AM    #HEME:  No active issues  - DVT ppx with Lovenox 40q12     #MSK  // chronic hip / lower back pain w radiation   - per pt, prior imaging found lumbar TP stenosis, arthritic changes  - lidocaine patch qd, gabapentin 100qd, tyenol prn  - s/p 1x low dose dilaudid   - PT Consult recs subacute rehab --> pt and daughter do not want 69yo F admitted wit HHS, hypertensive urgency c/b generalized seizure?/AMS most likely 2/2 rapid correction of blood sugar and osmolar shit requiring intubation x1day s/p 8/27 now PO diet with basal bolus insulin. New DM dx this admission. Started on Keppra out of concern for seizure, work up found possible meningoma on CT. s/p MRI pending read.   Bedboarded Day 3. Stable for discharge from primary team standpoint pending endo and neuro recs.    #Neuro:   //Generalized seizure/Encephalopathy 2/2 hyperglycemic / hyperosmolar shift now new loculated density in the LIAC concerning for meningioma awaiting MRI brain  - no h/o seizure reported- no further evidence of seizure activity on EEG's  - per neuro c/w Keppra 1gm/q12H- if no further seizures and no evidence of meningeoma pos d/c --> f/u MRI read and call Neuro for D/C recs  -c/w ASA and Statin  -c/w neuro checks as per protocol    //Neuropathic pain 2/2 chronic lumbar stenosis   - started gabapentin 100 po    #Pulm:   //Acute hypoxemic Respiratory Failure s/p intubation for airway protection 2/2 AMS / encephalopathy /generalized seizure? activity   - s/p extubation 8/27  - c/w RA- O2 satting well  - aspiration precautions  - Incentive spirometry, Chest PT    #CV: A/W Hypertensive urgency () s/p total Labetalol 40 IV on admission s/p cardene gtt [08/29] bridged to nicardipine PO  - c/w Lisinopril 40 qd, Nicardipine 40 tid  - c/w Labetalol PRN- 10mg IVP w paratmeters  - monitor vitals     #GI/: No active issues  - s/p keofeed, TF  - passed BS swallow  - PO soft diet / diabetic  - c/w bowel regimen  - c/w PPI daily    #ID: Afebrile, no leukocytosis at presentation had been initially empirically treated with ceftriaxone for suspicion of meningitis then at a lower dose of aspiration pna now d/c'd    - sputum + pseudomonas A.   - c/w Cefepime: day #5/5     #FEN/ENDO: Hyperglycemic Hyperosmolar State vs DKA / HHNK s/p insulin gtt transitioned to HISS and lantus- now resolved  - new dx of DM on this admission  - HbA1c 9.0  - increased basal -> Lantus 45U qhs [08/31-], low ss correction at bedtime  - increased pre-meal -> Humalog 16U tid [08/31-], seperate low ss correction for pre-meals  - endocrine on board --> f/u recs for d/c  - home diabetic education, new DM patient this admission  - monitor POCT glu, 140 AM    //hyperK  - may be hemolyzed 5.8, EKG bedside normal, pt asymptomatic will follow up repeat    #HEME:  No active issues  - DVT ppx with Lovenox 40q12     #MSK  // chronic hip / lower back pain w radiation   - per pt, prior imaging found lumbar TP stenosis, arthritic changes  - lidocaine patch qd, gabapentin 100qd, tyenol prn  - s/p 1x low dose dilaudid   - PT Consult recs subacute rehab --> pt and daughter do not want

## 2020-09-02 NOTE — PROGRESS NOTE ADULT - ATTENDING COMMENTS
Pt seen and examined. 70 F with HTN, admitted to MICU with hypertensive urgency and DKA with course complicated by a seizure, acute respiratory failure with hypoxia requiring intubation and pseudomonas pneumonia.  -- doing well, awake, alert and oriented. C/w Keppra per Neuro. F/u MRI results for meningioma  -- c/w Lisinopril and Nicardipine for HTN  -- C/w Lantus and Insulin s/s per endo recommendations.  Stable for discharge with close endocrine f/u. Patient refuses subacute rehab

## 2020-09-02 NOTE — PROGRESS NOTE ADULT - NSHPATTENDINGPLANDISCUSS_GEN_ALL_CORE
MICU team
Dr Cabral.
MICU team
resident/fellow/nurse
MICU team
resident/fellow/nurse
resident/fellow/nurse

## 2020-09-02 NOTE — PROGRESS NOTE ADULT - REASON FOR ADMISSION
HHS, Seizure

## 2020-09-02 NOTE — DISCHARGE NOTE PROVIDER - NSFOLLOWUPCLINICS_GEN_ALL_ED_FT
[facility]  Endocrinology  [address] [place] [address]  Phone: [phone]  Fax:   Follow Up Time: 1 week

## 2020-09-02 NOTE — DISCHARGE NOTE PROVIDER - CARE PROVIDER_API CALL
Reji Patterson  CLINICAL NEUROPHYSIOLOGY  [address], [address]  [place] [address]  Phone: [phone]  Fax: [phone]  Follow Up Time: 1 week

## 2020-09-02 NOTE — DISCHARGE NOTE PROVIDER - HOSPITAL COURSE
Pt is a 69 y/o F with history of HTN and gout presenting to [facility] ED w/ 2 weeks generalized weakness, 1 week of dysuria and frequency, and new-onset hand tremors with rest and movement. Pt was alert and oriented x4, afebrile, HR 88, and /102. CMP was significant for elevated glucose at 796 w/ elevated anion gap of 22, bicarb 21. Denies prior history of DM. Trop T 74. She was given 1L LR bolus. CXR showed clear lungs. CTH showing possible meningioma. Pt started on insulin gtt 5.7u/hr with rapid correction of glucose to 796 -> 653 -> 562. Given labetalol IV 10mg x2, 20mg x1 for HTN urgency and admitted to MICU.         On arrival to MICU pt was seizing and nonresponsive with diffuse myoclonus, likely 2/2 fast rate of glucose drop. Received 4mg IV ativan and rapid sequence intubation with medical sedation. Pt had no prior or family history of seizures and no recent medication changes. Started keppra 1g q12 for seizure coverage, ceftriaxone 5 days for empiric aspiration coverage, cardene ggt intermittently for HTNive urgency control. Given insulin, D5LR w/ FSG downtrending but consistently 300s. A 24 hour EEG showed no seizure activity. Extubated and sedatives discontinued after 1 day; intermittent precedex continued for agitation in setting of AMS. AG closed, transitioned to NPH w/ ISS while NPO. NG tube placed and basal bolus regimen started per endocrinology. Cardene         ceftriaxione         08/28" on/off precedex / cardine drip transition to PO; net+ stop fluids; responsive to questioning no spont speech; some delirium, wbc trending down, iss coverage uped to moderate, f/u endo recs; still on keppra, added cardipine via NG tube; still hold feeds; f/u neurology recs today about keppra and eeg; ceftriaxone for 5 days continued;     ON: lantus increased to 32U per endo     8/29: started lisinopril 20, abx changed from ceftriaxone to cefepime for 5 days, NPO w/ tube feeds, adjust lantus , dc eeg . lisinopril  40 starting tomorrow , lobetolol prn SBP >180 , bed board this afternoon     ON Lantus 34 per endo    8/30 Increased to Lantus 38. Adjusted cefepime to 2 g q8, will do 5 day course for Pseudomonas. D/c protonix. Ready to bedboard ; PO diet    8/31 Status quo, awaiting transfer to floors; day 3/5 cefepime, fs high yesterday [200-300s] got lantus 38 yesterday PM, today humalog premeal 14U plus ss low for each; f/u MRI called and completed safety form    9/1: Pending MRI today. Back pain, treated with tramadol 25, dilaudid 0.2 IV, lidocaine patch, tylenol. Endo for d/c recs. PT recommending rehab, speak to daughter     9/2 MRI results pending. Status quo Pt is a 69 y/o F with history of HTN and gout presenting to [facility] ED w/ 2 weeks generalized weakness, 1 week of dysuria and frequency, and new-onset hand tremors with rest and movement. Pt was alert and oriented x4, afebrile, HR 88, and /102. CMP was significant for elevated glucose at 796 w/ elevated anion gap of 22, bicarb 21. Denies prior history of DM. Trop T 74. She was given 1L LR bolus. CXR showed clear lungs. CTH showing possible meningioma. Pt started on insulin gtt 5.7u/hr with rapid correction of glucose to 796 -> 653 -> 562. Given labetalol IV 10mg x2, 20mg x1 for HTN urgency and admitted to MICU.         On arrival to MICU pt was seizing and nonresponsive with diffuse myoclonus, likely 2/2 fast rate of glucose drop. Received 4mg IV ativan and rapid sequence intubation with medical sedation. Pt had no prior or family history of seizures and no recent medication changes. Started keppra 1g q12 for seizure coverage, ceftriaxone for empiric aspiration coverage in setting of leukocytosis, intermittent cardene ggt for HTNive urgency control. Given insulin, D5LR w/ FSG downtrending but consistently 300s. A 24 hour EEG showed no seizure activity. Extubated and sedatives discontinued after 1 day; intermittent precedex continued for agitation in setting of AMS. Blood cultures from admission were negative, sputum cultures + Rare Pseudomonas aeruginosa. Ceftriaxone d/rylan after 3 days and switched to cefepime for 5 day course. AG closed, transitioned to NPH w/ ISS while NPO. NG tube placed and basal bolus regimen started per endocrinology. Cardene ggt transitioned to PO Nicardipine 40 tid, restarted home lisinopril 40 qd. Patient passed speech and swallow, tolerated PO intake with DM control on Lantus 45U, pre-meal Humalog 16U TID. MRI to further evaluate CT finding of         ceftriaxione         08/28" on/off precedex / cardine drip transition to PO; net+ stop fluids; responsive to questioning no spont speech; some delirium, wbc trending down, iss coverage uped to moderate, f/u endo recs; still on keppra, added cardipine via NG tube; still hold feeds; f/u neurology recs today about keppra and eeg; ceftriaxone for 5 days continued;     ON: lantus increased to 32U per endo     8/29: started lisinopril 20, abx changed from ceftriaxone to cefepime for 5 days, NPO w/ tube feeds, adjust lantus , dc eeg . lisinopril  40 starting tomorrow , lobetolol prn SBP >180 , bed board this afternoon     ON Lantus 34 per endo    8/30 Increased to Lantus 38. Adjusted cefepime to 2 g q8, will do 5 day course for Pseudomonas. D/c protonix. Ready to bedboard ; PO diet    8/31 Status quo, awaiting transfer to floors; day 3/5 cefepime, fs high yesterday [200-300s] got lantus 38 yesterday PM, today humalog premeal 14U plus ss low for each; f/u MRI called and completed safety form    9/1: Pending MRI today. Back pain, treated with tramadol 25, dilaudid 0.2 IV, lidocaine patch, tylenol. Endo for d/c recs. PT recommending rehab, speak to daughter     9/2 MRI results pending. Status quo Pt is a 69 y/o F with history of HTN and gout presenting to [facility] ED w/ 2 weeks generalized weakness, 1 week of dysuria and frequency, and new-onset hand tremors with rest and movement. Pt was alert and oriented x4, afebrile, HR 88, and /102. CMP was significant for elevated glucose at 796 w/ elevated anion gap of 22, bicarb 21. Denies prior history of DM. Trop T 74. She was given 1L LR bolus. CXR showed clear lungs. CTH showing possible meningioma. Pt started on insulin gtt 5.7u/hr with rapid correction of glucose to 796 -> 653 -> 562. Given labetalol IV 10mg x2, 20mg x1 for HTN urgency and admitted to MICU.         On arrival to MICU pt was seizing and nonresponsive with diffuse myoclonus, likely 2/2 fast rate of glucose drop. Received 4mg IV ativan and rapid sequence intubation with medical sedation 2/2 respiratory distress. Pt had no prior or family history of seizures and no recent medication changes. Started keppra 1g q12 for seizure coverage, ceftriaxone for empiric aspiration coverage in setting of leukocytosis, intermittent cardene ggt for HTNive urgency control. Given insulin, D5LR w/ FSG downtrending but consistently 300s. A 24 hour EEG showed no seizure activity. Extubated and sedatives discontinued after 1 day; intermittent precedex continued for agitation in setting of AMS. Blood cultures from admission were negative, sputum cultures + Rare Pseudomonas aeruginosa. Ceftriaxone d/rylan after 3 days and switched to cefepime for 5 day course. AG closed, transitioned to NPH w/ ISS while NPO. NG tube placed and basal bolus regimen started per endocrinology. Cardene ggt transitioned to PO Nicardipine 40 tid, restarted home lisinopril 40 qd. Patient passed speech and swallow, tolerated PO intake with DM control on Lantus 45U, pre-meal Humalog 16U TID. Alert and oriented x4. Endorsed chronic lower back and bilateral hip pain with radiation to legs managed with NSAIDs, lidocaine patch, gabapentin inpatient. MRI to further evaluate CT findings did not show evidence of meningioma. Keppra was stopped. PT evaluation recommended subacute rehab; patient and family declined. Diabetic education was provided and patient was discharged to home on HTN and DM medications with endocrine follow up appointment in 3 weeks. Pt is a 69 y/o F with history of HTN and gout presenting to [facility] ED w/ 2 weeks generalized weakness, 1 week of dysuria and frequency, and new-onset hand tremors with rest and movement. Pt was alert and oriented x4, afebrile, HR 88, and /102. CMP was significant for elevated glucose at 796 w/ elevated anion gap of 22, bicarb 21. Denies prior history of DM. Trop T 74. She was given 1L LR bolus. CXR showed clear lungs. CTH showing nonspecific density in L costophrenic angle. Pt started on insulin gtt 5.7u/hr with rapid correction of glucose to 796 -> 653 -> 562. Given labetalol IV 10mg x2, 20mg x1 for HTN urgency and admitted to MICU.         On arrival to MICU pt was seizing and nonresponsive with diffuse myoclonus, likely 2/2 fast rate of glucose drop. Received 4mg IV ativan and rapid sequence intubation with medical sedation 2/2 respiratory distress. Pt had no prior or family history of seizures and no recent medication changes. Started keppra 1g q12 for seizure coverage, ceftriaxone for empiric aspiration coverage in setting of leukocytosis, intermittent cardene ggt for HTNive urgency control. Given insulin, D5LR w/ FSG downtrending but consistently 300s. A 24 hour EEG showed no seizure activity. Extubated and sedatives discontinued after 1 day; intermittent precedex continued for agitation in setting of AMS. Blood cultures from admission were negative, sputum cultures + Rare Pseudomonas aeruginosa. Ceftriaxone d/rylan after 3 days and switched to cefepime for 5 day course. AG closed, transitioned to NPH w/ ISS while NPO. NG tube placed and basal bolus regimen started per endocrinology. Cardene ggt transitioned to PO Nicardipine 40 tid, restarted home lisinopril 40 qd. Patient passed speech and swallow, tolerated PO intake with DM control on Lantus 45U, pre-meal Humalog 16U TID. Alert and oriented x4. Endorsed chronic lower back and bilateral hip pain with radiation to legs managed with NSAIDs, lidocaine patch, gabapentin inpatient. MRI to further evaluate CT findings did not show evidence of meningioma or schwannoma. Plans to discharge on current Keppra dose with outpatient taper via Dr. Patterson. PT evaluation recommended subacute rehab; patient and family declined. Diabetic education was provided and patient was discharged to home on HTN and DM medications with endocrine follow up appointment in 3 weeks. Pt is a 71 y/o F with history of HTN and gout presenting to [facility] ED w/ 2 weeks generalized weakness, 1 week of dysuria and frequency, and new-onset hand tremors with rest and movement. Pt was alert and oriented x4, afebrile, HR 88, and /102. CMP was significant for elevated glucose at 796 w/ elevated anion gap of 22, bicarb 21. Denies prior history of DM. Trop T 74. She was given 1L LR bolus. CXR showed clear lungs. CTH showing nonspecific density in L costophrenic angle. Pt started on insulin gtt 5.7u/hr with rapid correction of glucose to 796 -> 653 -> 562. Given labetalol IV 10mg x2, 20mg x1 for HTN urgency and admitted to MICU.         On arrival to MICU pt was seizing and nonresponsive with diffuse myoclonus, likely 2/2 fast rate of glucose drop along with hypertensive urgency. Received 4mg IV ativan and rapid sequence intubation with medical sedation 2/2 respiratory distress. Pt had no prior or family history of seizures and no recent medication changes. Started keppra 1g q12 for seizure coverage, ceftriaxone for empiric aspiration coverage in setting of leukocytosis, intermittent cardene ggt for HTNive urgency control. Given insulin, D5LR w/ FSG downtrending but consistently 300s. A 24 hour EEG showed no seizure activity. Extubated and sedatives discontinued after 1 day; intermittent precedex continued for agitation in setting of AMS. Blood cultures from admission were negative, sputum cultures + Rare Pseudomonas aeruginosa. Ceftriaxone d/rylan after 3 days and switched to cefepime for 5 day course. AG closed, transitioned to NPH w/ ISS while NPO. NG tube placed and basal bolus regimen started per endocrinology. Cardene ggt transitioned to PO Nicardipine 40 tid, restarted home lisinopril 40 qd. Patient passed speech and swallow, tolerated PO intake with DM control on Lantus 45U, pre-meal Humalog 16U TID. Alert and oriented x4. Endorsed chronic lower back and bilateral hip pain with radiation to legs managed with NSAIDs, lidocaine patch, gabapentin inpatient. MRI to further evaluate CT findings did not show evidence of meningioma or schwannoma, noted an osteoma. Plans to discharge on current Keppra dose with outpatient taper via Dr. Patterson. PT evaluation recommended subacute rehab; patient and family declined. Diabetic education was provided and patient was discharged to home on HTN and DM medications with endocrine follow up appointment in 1-2 weeks. Pt medically stable to be discharged home. Pt is a 71 y/o F with history of HTN and gout presenting to [facility] ED w/ 2 weeks generalized weakness, 1 week of dysuria and frequency, and new-onset hand tremors with rest and movement. Pt was alert and oriented x4, afebrile, HR 88, and /102. CMP was significant for elevated glucose at 796 w/ elevated anion gap of 22, bicarb 21. Denies prior history of DM. Trop T 74. She was given 1L LR bolus. CXR showed clear lungs. CTH showing nonspecific density in L costophrenic angle. Pt started on insulin gtt 5.7u/hr with rapid correction of glucose to 796 -> 653 -> 562. Given labetalol IV 10mg x2, 20mg x1 for HTN urgency and admitted to MICU.         On arrival to MICU pt was seizing and nonresponsive with diffuse myoclonus, likely 2/2 fast rate of glucose drop along with hypertensive urgency. Received 4mg IV ativan and rapid sequence intubation with medical sedation 2/2 respiratory distress. Pt had no prior or family history of seizures and no recent medication changes. Started keppra 1g q12 for seizure coverage, ceftriaxone for empiric aspiration coverage in setting of leukocytosis, intermittent cardene ggt for HTNive urgency control. A 24 hour EEG showed no seizure activity. Extubated and sedatives discontinued. MRI to further evaluate CT findings did not show evidence of meningioma or schwannoma, noted an osteoma. Plans to discharge on current Keppra dose with outpatient taper via Dr. Patterson.         For HTN, pt continued on lisinopril 40 mg daily and started on nicardipine 40 TID, with adequate blood pressure control.         Infectious workup positive for sputum cultures + Rare Pseudomonas aeruginosa, treated with 5 day course of cefepime.         Seen by Endocrine for new diabetes with A1c 9. Started on basal bolus regimen. Lantus 45U, pre-meal Humalog 16U TID.         PT evaluation recommended subacute rehab; patient and family declined. Diabetic education was provided and patient was discharged to home on HTN and DM medications with endocrine follow up appointment in 1-2 weeks. Pt medically stable to be discharged home.

## 2020-09-02 NOTE — DISCHARGE NOTE PROVIDER - NSDCCPTREATMENT_GEN_ALL_CORE_FT
PRINCIPAL PROCEDURE  Procedure: MRI head  Findings and Treatment: CT head showed findings concerning for a meningioma, repeat MRI showed evidence of an osteoma. Please follow up with Neurology.

## 2020-09-02 NOTE — PROGRESS NOTE ADULT - PROVIDER SPECIALTY LIST ADULT
Endocrinology
MICU
Neurology
Endocrinology
MICU
MICU
Endocrinology

## 2020-09-11 LAB — INSULIN HUMAN IGE QN: <0.4 U/ML — SIGNIFICANT CHANGE UP

## 2020-09-28 ENCOUNTER — APPOINTMENT (OUTPATIENT)
Dept: ENDOCRINOLOGY | Facility: CLINIC | Age: 70
End: 2020-09-28
Payer: MEDICARE

## 2020-09-28 ENCOUNTER — APPOINTMENT (OUTPATIENT)
Dept: ENDOCRINOLOGY | Facility: CLINIC | Age: 70
End: 2020-09-28

## 2020-09-28 VITALS
OXYGEN SATURATION: 98 % | SYSTOLIC BLOOD PRESSURE: 130 MMHG | HEART RATE: 80 BPM | HEIGHT: 64 IN | DIASTOLIC BLOOD PRESSURE: 80 MMHG | BODY MASS INDEX: 50.02 KG/M2 | WEIGHT: 293 LBS

## 2020-09-28 DIAGNOSIS — R56.9 UNSPECIFIED CONVULSIONS: ICD-10-CM

## 2020-09-28 DIAGNOSIS — E11.65 TYPE 2 DIABETES MELLITUS WITH HYPERGLYCEMIA: ICD-10-CM

## 2020-09-28 DIAGNOSIS — Z83.3 FAMILY HISTORY OF DIABETES MELLITUS: ICD-10-CM

## 2020-09-28 DIAGNOSIS — Z82.49 FAMILY HISTORY OF ISCHEMIC HEART DISEASE AND OTHER DISEASES OF THE CIRCULATORY SYSTEM: ICD-10-CM

## 2020-09-28 DIAGNOSIS — I10 ESSENTIAL (PRIMARY) HYPERTENSION: ICD-10-CM

## 2020-09-28 DIAGNOSIS — E78.5 HYPERLIPIDEMIA, UNSPECIFIED: ICD-10-CM

## 2020-09-28 DIAGNOSIS — K57.90 DIVERTICULOSIS OF INTESTINE, PART UNSPECIFIED, W/OUT PERFORATION OR ABSCESS W/OUT BLEEDING: ICD-10-CM

## 2020-09-28 PROCEDURE — 99204 OFFICE O/P NEW MOD 45 MIN: CPT

## 2020-09-28 PROCEDURE — 99213 OFFICE O/P EST LOW 20 MIN: CPT

## 2020-09-28 RX ORDER — GABAPENTIN 100 MG/1
100 CAPSULE ORAL
Qty: 90 | Refills: 3 | Status: ACTIVE | COMMUNITY
Start: 2020-09-28

## 2020-09-28 RX ORDER — OMEPRAZOLE 20 MG/1
20 CAPSULE, DELAYED RELEASE ORAL
Qty: 90 | Refills: 0 | Status: DISCONTINUED | COMMUNITY
Start: 2016-12-22 | End: 2020-09-28

## 2020-09-28 RX ORDER — BRIMONIDINE TARTRATE 1 MG/ML
0.1 SOLUTION/ DROPS OPHTHALMIC
Qty: 5 | Refills: 0 | Status: DISCONTINUED | COMMUNITY
Start: 2016-11-01 | End: 2020-09-28

## 2020-09-28 RX ORDER — LISINOPRIL 40 MG/1
40 TABLET ORAL DAILY
Qty: 30 | Refills: 4 | Status: ACTIVE | COMMUNITY
Start: 2020-09-28

## 2020-09-28 RX ORDER — ALLOPURINOL 100 MG/1
100 TABLET ORAL
Qty: 180 | Refills: 0 | Status: DISCONTINUED | COMMUNITY
Start: 2016-09-07 | End: 2020-09-28

## 2020-09-28 RX ORDER — LATANOPROST/PF 0.005 %
0.01 DROPS OPHTHALMIC (EYE)
Qty: 25 | Refills: 0 | Status: DISCONTINUED | COMMUNITY
Start: 2016-11-01 | End: 2020-09-28

## 2020-09-28 RX ORDER — CYCLOBENZAPRINE HYDROCHLORIDE 10 MG/1
10 TABLET, FILM COATED ORAL
Qty: 90 | Refills: 0 | Status: DISCONTINUED | COMMUNITY
Start: 2017-06-13 | End: 2020-09-28

## 2020-09-28 RX ORDER — METFORMIN HYDROCHLORIDE 500 MG/1
500 TABLET, COATED ORAL
Qty: 120 | Refills: 3 | Status: ACTIVE | COMMUNITY
Start: 2020-09-28 | End: 1900-01-01

## 2020-09-28 RX ORDER — NICARDIPINE HYDROCHLORIDE 20 MG/1
20 CAPSULE ORAL
Refills: 0 | Status: ACTIVE | COMMUNITY
Start: 2020-09-28

## 2020-09-28 RX ORDER — TRAVOPROST 0.04 MG/ML
0 SOLUTION OPHTHALMIC
Refills: 0 | Status: ACTIVE | COMMUNITY
Start: 2020-09-28

## 2020-09-28 RX ORDER — DICLOFENAC SODIUM 10 MG/G
1 GEL TOPICAL DAILY
Qty: 1 | Refills: 2 | Status: DISCONTINUED | COMMUNITY
Start: 2018-11-14 | End: 2020-09-28

## 2020-09-28 RX ORDER — HYALURONATE SODIUM 20 MG/2 ML
20 SYRINGE (ML) INTRAARTICULAR
Qty: 2 | Refills: 0 | Status: DISCONTINUED | OUTPATIENT
Start: 2018-11-02 | End: 2020-09-28

## 2020-09-28 RX ORDER — BRIMONIDINE TARTRATE 1 MG/ML
0.1 SOLUTION/ DROPS OPHTHALMIC
Refills: 0 | Status: ACTIVE | COMMUNITY
Start: 2020-09-28

## 2020-09-28 RX ORDER — PANTOPRAZOLE 40 MG/1
40 TABLET, DELAYED RELEASE ORAL DAILY
Refills: 0 | Status: ACTIVE | COMMUNITY
Start: 2020-09-28

## 2020-09-28 RX ORDER — POTASSIUM CHLORIDE 1500 MG/1
20 TABLET, FILM COATED, EXTENDED RELEASE ORAL 3 TIMES DAILY
Qty: 150 | Refills: 1 | Status: ACTIVE | COMMUNITY
Start: 2020-09-28

## 2020-09-29 PROBLEM — R56.9 SEIZURE: Status: ACTIVE | Noted: 2020-09-29

## 2020-09-29 PROBLEM — E78.5 HLD (HYPERLIPIDEMIA): Status: ACTIVE | Noted: 2020-09-29

## 2020-09-29 PROBLEM — I10 HTN (HYPERTENSION): Status: ACTIVE | Noted: 2020-09-29

## 2020-09-29 PROBLEM — Z83.3 FAMILY HISTORY OF DIABETES MELLITUS: Status: ACTIVE | Noted: 2020-09-29

## 2020-09-29 PROBLEM — K57.90 DIVERTICULOSIS: Status: ACTIVE | Noted: 2020-09-29

## 2020-09-29 PROBLEM — Z82.49 FAMILY HISTORY OF CONGESTIVE HEART FAILURE: Status: ACTIVE | Noted: 2020-09-29

## 2020-09-29 NOTE — HISTORY OF PRESENT ILLNESS
[FreeTextEntry1] : 69 yo F with PMH DM2, HTN, HLD, seizure\par \par Admission for newly diagnosed DM2/HHS/seizures 8/26 - 9/2/20\par \par Microvascular complications: admits to neuropathy\par She is on lantus 45 u qhs and humalog 16 u tidac\par B: cream of wheat + egg \par glucerna\par L: soup  sweet potato/pumpkin/squash  + water\par D; fish + veg + sweet potato + water \par snack: 1/2 banana, 1/2 orange\par Last ophthalmologist visit: 3/2020\par \par

## 2020-09-29 NOTE — ASSESSMENT
[FreeTextEntry1] : 69 yo F with PMH DM2, HTN, HLD, seizure\par \par 1. DM2 - change to lantus to 28 units qhs and humalog 9 u tidac with metformin 500 mg once daily\par \par 2. HTN - continue lisinopril, nicardine\par \par 3. HLD -  check lipid profile

## 2020-11-01 ENCOUNTER — NON-APPOINTMENT (OUTPATIENT)
Age: 70
End: 2020-11-01

## 2021-03-18 NOTE — DISCHARGE NOTE PROVIDER - NSDCCPCAREPLAN_GEN_ALL_CORE_FT
Renal calculi PRINCIPAL DISCHARGE DIAGNOSIS  Diagnosis: Newly diagnosed diabetes  Assessment and Plan of Treatment: PRINCIPAL DISCHARGE DIAGNOSIS  Diagnosis: Newly diagnosed diabetes  Assessment and Plan of Treatment: Make sure you get your HgA1c checked every three months.  If you take insulin, check your blood glucose before meals and at bedtime.  It's important not to skip any meals.  Keep a log of your blood glucose results and always take it with you to your doctor appointments.  Keep a list of your current medications including injectables and over the counter medications and bring this medication list with you to all your doctor appointments.  If you have not seen your ophthalmologist this year call for appointment.  Check your feet daily for redness, sores, or openings. Do not self treat. If no improvement in two days call your primary care physician for an appointment.  Low blood sugar (hypoglycemia) is a blood sugar below 70mg/dl. Check your blood sugar if you feel signs/symptoms of hypoglycemia. If your blood sugar is below 70 take 15 grams of carbohydrates (ex 4 oz of apple juice, 3-4 glucose tablets, or 4-6 oz of regular soda) wait 15 minutes and repeat blood sugar to make sure it comes up above 70.  If your blood sugar is above 70 and you are due for a meal, have a meal.  If you are not due for a meal have a snack.  This snack helps keeps your blood sugar at a safe range.      SECONDARY DISCHARGE DIAGNOSES  Diagnosis: Hypertension, unspecified type  Assessment and Plan of Treatment: You had elevated BP when you came to the hospital. We treated you with the medications lisinopril and nicardipine. Please continue to take this and follow up with your PCP in 1-2 weeks.    Diagnosis: Seizure  Assessment and Plan of Treatment: You had seizure activity here in the hospital. This was likely in the setting of elevated blood pressure and elevated glucose levels. CT head showed findings concerning for a meningioma, repeat MRI showed evidence of an osteoma. Neurology saw you and you were started on a medication for seizures, keppra. Please continue to take this and follow up with Neurology in 1-2 weeks. PRINCIPAL DISCHARGE DIAGNOSIS  Diagnosis: Newly diagnosed diabetes  Assessment and Plan of Treatment: Make sure you get your HgA1c checked every three months.  If you take insulin, check your blood glucose before meals and at bedtime.  It's important not to skip any meals.  Keep a log of your blood glucose results and always take it with you to your doctor appointments.  Keep a list of your current medications including injectables and over the counter medications and bring this medication list with you to all your doctor appointments.  If you have not seen your ophthalmologist this year call for appointment.  Check your feet daily for redness, sores, or openings. Do not self treat. If no improvement in two days call your primary care physician for an appointment.  Low blood sugar (hypoglycemia) is a blood sugar below 70mg/dl. Check your blood sugar if you feel signs/symptoms of hypoglycemia. If your blood sugar is below 70 take 15 grams of carbohydrates (ex 4 oz of apple juice, 3-4 glucose tablets, or 4-6 oz of regular soda) wait 15 minutes and repeat blood sugar to make sure it comes up above 70.  If your blood sugar is above 70 and you are due for a meal, have a meal.  If you are not due for a meal have a snack.  This snack helps keeps your blood sugar at a safe range.      SECONDARY DISCHARGE DIAGNOSES  Diagnosis: Hypertension, unspecified type  Assessment and Plan of Treatment: You had elevated BP when you came to the hospital. We treated you with the medications lisinopril and nicardipine. Please continue to take this and follow up with your PCP in 1-2 weeks.    Diagnosis: Seizure  Assessment and Plan of Treatment: You had seizure activity here in the hospital. This was likely in the setting of elevated blood pressure and elevated glucose levels. EEG did not show seizure activity. Neurology saw you and you were started on a medication for seizures, keppra. Please continue to take this and follow up with Neurology in 1-2 weeks.

## 2021-07-14 NOTE — ED ADULT NURSE NOTE - NS ED NURSE IV DC DT
Progress Note     Today's date: 2021  Patient name: Kunal Hu  : 1942  MRN: [id number]  Referring provider: Ailyn Avelar 01-Dec-2017 18:51

## 2021-10-11 NOTE — DISCHARGE NOTE PROVIDER - NSDCHHATTENDCERT_GEN_ALL_CORE
My signature below certifies that the above stated patient is homebound and upon completion of the Face-To-Face encounter, has the need for intermittent skilled nursing, physical therapy and/or speech or occupational therapy services in their home for their current diagnosis as outlined in their initial plan of care. These services will continue to be monitored by myself or another physician. [FreeTextEntry1] : Here for follow-up.

## 2024-09-28 NOTE — PROGRESS NOTE ADULT - NSTELEHEALTH_GEN_ALL_CORE
No hx cannabis use per chart [facility] Counseling: I discussed with the patient the risks of risankizumab-rzaa including but not limited to immunosuppression, and serious infections.  The patient understands that monitoring is required including a PPD at baseline and must alert us or the primary physician if symptoms of infection or other concerning signs are noted.

## 2024-12-13 ENCOUNTER — APPOINTMENT (OUTPATIENT)
Dept: OTOLARYNGOLOGY | Facility: CLINIC | Age: 74
End: 2024-12-13
Payer: MEDICARE

## 2024-12-13 VITALS
HEART RATE: 78 BPM | WEIGHT: 280 LBS | SYSTOLIC BLOOD PRESSURE: 145 MMHG | DIASTOLIC BLOOD PRESSURE: 83 MMHG | TEMPERATURE: 98.1 F | BODY MASS INDEX: 47.8 KG/M2 | HEIGHT: 64 IN

## 2024-12-13 DIAGNOSIS — R09.81 NASAL CONGESTION: ICD-10-CM

## 2024-12-13 DIAGNOSIS — J35.1 HYPERTROPHY OF TONSILS: ICD-10-CM

## 2024-12-13 DIAGNOSIS — J35.8 OTHER CHRONIC DISEASES OF TONSILS AND ADENOIDS: ICD-10-CM

## 2024-12-13 DIAGNOSIS — H61.22 IMPACTED CERUMEN, LEFT EAR: ICD-10-CM

## 2024-12-13 DIAGNOSIS — H93.8X3 OTHER SPECIFIED DISORDERS OF EAR, BILATERAL: ICD-10-CM

## 2024-12-13 DIAGNOSIS — R07.0 PAIN IN THROAT: ICD-10-CM

## 2024-12-13 PROCEDURE — 92557 COMPREHENSIVE HEARING TEST: CPT

## 2024-12-13 PROCEDURE — 99204 OFFICE O/P NEW MOD 45 MIN: CPT | Mod: 25

## 2024-12-13 PROCEDURE — 31575 DIAGNOSTIC LARYNGOSCOPY: CPT

## 2024-12-13 PROCEDURE — 92567 TYMPANOMETRY: CPT

## 2024-12-13 PROCEDURE — G0268 REMOVAL OF IMPACTED WAX MD: CPT

## 2024-12-13 RX ORDER — FLUTICASONE PROPIONATE 50 UG/1
50 SPRAY, METERED NASAL TWICE DAILY
Qty: 1 | Refills: 5 | Status: ACTIVE | COMMUNITY
Start: 2024-12-13 | End: 1900-01-01

## 2024-12-20 ENCOUNTER — OUTPATIENT (OUTPATIENT)
Dept: OUTPATIENT SERVICES | Facility: HOSPITAL | Age: 74
LOS: 1 days | End: 2024-12-20
Payer: MEDICARE

## 2024-12-20 ENCOUNTER — APPOINTMENT (OUTPATIENT)
Dept: CT IMAGING | Facility: IMAGING CENTER | Age: 74
End: 2024-12-20
Payer: MEDICARE

## 2024-12-20 DIAGNOSIS — Z00.8 ENCOUNTER FOR OTHER GENERAL EXAMINATION: ICD-10-CM

## 2024-12-20 DIAGNOSIS — J35.1 HYPERTROPHY OF TONSILS: ICD-10-CM

## 2024-12-20 PROCEDURE — 70491 CT SOFT TISSUE NECK W/DYE: CPT

## 2024-12-20 PROCEDURE — 70491 CT SOFT TISSUE NECK W/DYE: CPT | Mod: 26

## 2025-01-13 ENCOUNTER — APPOINTMENT (OUTPATIENT)
Dept: OTOLARYNGOLOGY | Facility: CLINIC | Age: 75
End: 2025-01-13
Payer: MEDICARE

## 2025-01-13 ENCOUNTER — RX RENEWAL (OUTPATIENT)
Age: 75
End: 2025-01-13

## 2025-01-13 VITALS — SYSTOLIC BLOOD PRESSURE: 116 MMHG | DIASTOLIC BLOOD PRESSURE: 78 MMHG

## 2025-01-13 DIAGNOSIS — J35.01 CHRONIC TONSILLITIS: ICD-10-CM

## 2025-01-13 PROCEDURE — 99214 OFFICE O/P EST MOD 30 MIN: CPT | Mod: 25

## 2025-01-13 PROCEDURE — 31575 DIAGNOSTIC LARYNGOSCOPY: CPT

## 2025-01-13 RX ORDER — FLUTICASONE PROPIONATE 50 UG/1
50 SPRAY, METERED NASAL
Qty: 48 | Refills: 0 | Status: ACTIVE | COMMUNITY
Start: 2025-01-13 | End: 1900-01-01

## 2025-01-13 RX ORDER — AMOXICILLIN AND CLAVULANATE POTASSIUM 875; 125 MG/1; MG/1
875-125 TABLET, COATED ORAL
Qty: 20 | Refills: 2 | Status: ACTIVE | COMMUNITY
Start: 2025-01-13 | End: 1900-01-01

## 2025-01-24 ENCOUNTER — OUTPATIENT (OUTPATIENT)
Dept: OUTPATIENT SERVICES | Facility: HOSPITAL | Age: 75
LOS: 1 days | End: 2025-01-24
Payer: MEDICARE

## 2025-01-24 ENCOUNTER — APPOINTMENT (OUTPATIENT)
Dept: CT IMAGING | Facility: IMAGING CENTER | Age: 75
End: 2025-01-24
Payer: MEDICARE

## 2025-01-24 DIAGNOSIS — J35.01 CHRONIC TONSILLITIS: ICD-10-CM

## 2025-01-24 DIAGNOSIS — J35.1 HYPERTROPHY OF TONSILS: ICD-10-CM

## 2025-01-24 DIAGNOSIS — Z00.8 ENCOUNTER FOR OTHER GENERAL EXAMINATION: ICD-10-CM

## 2025-01-24 PROCEDURE — 70491 CT SOFT TISSUE NECK W/DYE: CPT

## 2025-01-24 PROCEDURE — 70491 CT SOFT TISSUE NECK W/DYE: CPT | Mod: 26

## 2025-02-03 ENCOUNTER — APPOINTMENT (OUTPATIENT)
Dept: OTOLARYNGOLOGY | Facility: CLINIC | Age: 75
End: 2025-02-03
Payer: MEDICARE

## 2025-02-03 VITALS
BODY MASS INDEX: 47.8 KG/M2 | SYSTOLIC BLOOD PRESSURE: 167 MMHG | HEIGHT: 64 IN | HEART RATE: 78 BPM | OXYGEN SATURATION: 97 % | WEIGHT: 280 LBS | DIASTOLIC BLOOD PRESSURE: 74 MMHG

## 2025-02-03 PROCEDURE — 31575 DIAGNOSTIC LARYNGOSCOPY: CPT

## 2025-02-03 PROCEDURE — 99214 OFFICE O/P EST MOD 30 MIN: CPT | Mod: 25

## 2025-03-04 NOTE — PROCEDURE NOTE - NSINFORMCONSENT_GEN_A_CORE
This was an emergent procedure. Post-Care Instructions: I reviewed with the patient in detail post-care instructions. Patient is to wear sunprotection, and avoid picking at any of the treated lesions. Pt may apply Vaseline to crusted or scabbing areas. Render Post-Care Instructions In Note?: yes Spray Paint Text: The liquid nitrogen was applied to the skin utilizing a spray paint frosting technique. Medical Necessity Information: It is in your best interest to select a reason for this procedure from the list below. All of these items fulfill various CMS LCD requirements except the new and changing color options. Detail Level: Zone Add 52 Modifier (Optional): no Consent: The patient's consent was obtained including but not limited to risks of crusting, scabbing, blistering, scarring, darker or lighter pigmentary change, recurrence, incomplete removal and infection. Number Of Freeze-Thaw Cycles: 2 freeze-thaw cycles Medical Necessity Clause: This procedure was medically necessary because the lesions that were treated were: irritated

## 2025-08-04 ENCOUNTER — APPOINTMENT (OUTPATIENT)
Dept: OTOLARYNGOLOGY | Facility: CLINIC | Age: 75
End: 2025-08-04
Payer: MEDICARE

## 2025-08-04 VITALS
BODY MASS INDEX: 49.51 KG/M2 | SYSTOLIC BLOOD PRESSURE: 132 MMHG | OXYGEN SATURATION: 97 % | WEIGHT: 290 LBS | HEIGHT: 64 IN | DIASTOLIC BLOOD PRESSURE: 79 MMHG | HEART RATE: 70 BPM

## 2025-08-04 PROCEDURE — 99214 OFFICE O/P EST MOD 30 MIN: CPT | Mod: 25

## 2025-08-04 PROCEDURE — 31575 DIAGNOSTIC LARYNGOSCOPY: CPT

## 2025-08-04 RX ORDER — SOTALOL HYDROCHLORIDE 120 MG/1
TABLET ORAL
Refills: 0 | Status: ACTIVE | COMMUNITY

## 2025-08-04 RX ORDER — TIMOLOL MALEATE 6.8 MG/ML
SOLUTION OPHTHALMIC
Refills: 0 | Status: ACTIVE | COMMUNITY

## 2025-08-04 RX ORDER — TIRZEPATIDE 2.5 MG/.5ML
2.5 INJECTION, SOLUTION SUBCUTANEOUS
Refills: 0 | Status: ACTIVE | COMMUNITY